# Patient Record
Sex: FEMALE | Race: WHITE | Employment: FULL TIME | URBAN - METROPOLITAN AREA
[De-identification: names, ages, dates, MRNs, and addresses within clinical notes are randomized per-mention and may not be internally consistent; named-entity substitution may affect disease eponyms.]

---

## 2017-01-19 ENCOUNTER — OFFICE VISIT (OUTPATIENT)
Dept: INTERNAL MEDICINE CLINIC | Age: 27
End: 2017-01-19

## 2017-01-19 VITALS
HEIGHT: 61 IN | OXYGEN SATURATION: 99 % | BODY MASS INDEX: 30.02 KG/M2 | HEART RATE: 80 BPM | WEIGHT: 159 LBS | TEMPERATURE: 98.4 F | RESPIRATION RATE: 16 BRPM | SYSTOLIC BLOOD PRESSURE: 122 MMHG | DIASTOLIC BLOOD PRESSURE: 80 MMHG

## 2017-01-19 DIAGNOSIS — G43.009 MIGRAINE WITHOUT AURA AND WITHOUT STATUS MIGRAINOSUS, NOT INTRACTABLE: ICD-10-CM

## 2017-01-19 DIAGNOSIS — E53.8 VITAMIN B12 DEFICIENCY: Primary | ICD-10-CM

## 2017-01-19 RX ORDER — DOCUSATE SODIUM 100 MG/1
100 CAPSULE, LIQUID FILLED ORAL 2 TIMES DAILY
COMMUNITY
End: 2017-06-23 | Stop reason: ALTCHOICE

## 2017-01-19 RX ORDER — CYANOCOBALAMIN 1000 UG/ML
1000 INJECTION, SOLUTION INTRAMUSCULAR; SUBCUTANEOUS ONCE
Qty: 1 ML | Refills: 0
Start: 2017-01-19 | End: 2017-01-19

## 2017-01-19 NOTE — MR AVS SNAPSHOT
Visit Information Date & Time Provider Department Dept. Phone Encounter #  
 1/19/2017  3:30 PM Vamsi Vang MD [facility] [address] [id number] Follow-up Instructions Return in about 3 weeks (around 2/9/2017) for B12 shot. Your Appointments 1/26/2017  3:20 PM  
Follow Up with Veronica Metz MD  
[facility] Appt Note: 2 mo migraine f/u. ..[place] [phone]  
  
   
 [address] [place] [id number] [address] Upcoming Health Maintenance Date Due DTaP/Tdap/Td series (1 - Tdap) 2/12/2009 PAP AKA CERVICAL CYTOLOGY 2/12/2009 INFLUENZA AGE 9 TO ADULT 8/1/2016 Allergies as of 1/19/2017  Review Complete On: 1/19/2017 By: Vamsi Vang MD  
  
 Severity Noted Reaction Type Reactions Sulfur  04/15/2016    Unknown (comments) Current Immunizations  Never Reviewed No immunizations on file. Not reviewed this visit You Were Diagnosed With   
  
 Codes Comments Vitamin B12 deficiency    -  Primary ICD-10-CM: E53.8 ICD-9-CM: 266.2 Migraine without aura and without status migrainosus, not intractable     ICD-10-CM: V98.126 ICD-9-CM: 346.10 Vitals BP Pulse Temp Resp Height(growth percentile) Weight(growth percentile) 122/80 (BP 1 Location: Left arm, BP Patient Position: Sitting) 80 98.4 °F (36.9 °C) (Oral) 16 5' 1\" (1.549 m) 159 lb (72.1 kg) SpO2 BMI OB Status Smoking Status 99% 30.04 kg/m2 Having regular periods Never Smoker Vitals History BMI and BSA Data Body Mass Index Body Surface Area 30.04 kg/m 2 1.76 m 2 Preferred Pharmacy Pharmacy Name Phone [facility] [address]  [facility] [phone] Your Updated Medication List  
  
   
 This list is accurate as of: 1/19/17  4:12 PM.  Always use your most recent med list.  
  
  
  
  
 COLACE 100 mg capsule Generic drug:  docusate sodium Take 100 mg by mouth two (2) times a day. cyanocobalamin 1,000 mcg/mL injection Commonly known as:  VITAMIN B-12  
1 mL by IntraMUSCular route once for 1 dose. * Diclofenac Potassium 50 mg Pwpk Commonly known as:  CAMBIA Take 1 Packet by mouth as needed. As directed * Diclofenac Potassium 50 mg Pwpk  
1 at HA onset and repeat in 2 hours x 1 prn MICROGESTIN FE 1.5/30 (28) 1.5 mg-30 mcg (21)/75 mg (7) Tab Generic drug:  norethindrone-ethinyl estradiol-iron  
  
 propranolol LA 80 mg SR capsule Commonly known as:  INDERAL LA Take 1 Cap by mouth daily. QNASL 80 mcg/actuation Hfaa Generic drug:  beclomethasone dipropionate ZYRTEC PO Take  by mouth. * Notice: This list has 2 medication(s) that are the same as other medications prescribed for you. Read the directions carefully, and ask your doctor or other care provider to review them with you. We Performed the Following THER/PROPH/DIAG INJECTION, SUBCUT/IM [id number] CPT(R)] VITAMIN B12 INJECTION [ \[facility] [place] [facility]\""] Follow-up Instructions Return in about 3 weeks (around 2/9/2017) for B12 shot. Introducing hospitals & HEALTH SERVICES! Dear Keegan Vargas: Thank you for requesting a NextGame account. Our records indicate that you already have an active NextGame account. You can access your account anytime at [email or web address] Did you know that you can access your hospital and ER discharge instructions at any time in NextGame? You can also review all of your test results from your hospital stay or ER visit. Additional Information If you have questions, please visit the Frequently Asked Questions section of the NextGame website at [email or web address] Remember, ZoomTilthart is NOT to be used for urgent needs. For medical emergencies, dial 911. Now available from your iPhone and Android! Please provide this summary of care documentation to your next provider. Your primary care clinician is listed as Rebecca Rogers. If you have any questions after today's visit, please call [phone].

## 2017-01-19 NOTE — PROGRESS NOTES
Liset Mensah is a 27 y.o. female who presents today for Vitamin B12 Deficiency  . She has a history of   Patient Active Problem List   Diagnosis Code    Nephrolithiasis N20.0    Allergic rhinitis J30.9    Family history of early CAD Z80.55    Migraine without aura and without status migrainosus, not intractable G43.009   . Today patient is here for B12 deficiency. she does not have other concerns. B12 deficiency: Tollerated two shots well. B12 coming up but still borderline low. Will give 3 more every 3 weeks and then repeat. No numbness or tingling. Migraines have been getting a bit worse. Topomax was helping but had stone. BB with minimal help. ROS  Review of Systems   Constitutional: Negative for chills, fever, malaise/fatigue and weight loss. HENT: Negative for congestion, ear discharge, ear pain, hearing loss, nosebleeds and tinnitus. Eyes: Negative for blurred vision, double vision, photophobia, pain and discharge. Respiratory: Negative for stridor. Cardiovascular: Negative for chest pain, palpitations, orthopnea and claudication. Gastrointestinal: Positive for constipation (Chronic) and nausea. Negative for abdominal pain, blood in stool, diarrhea, heartburn, melena and vomiting. Genitourinary: Negative for dysuria, frequency, hematuria and urgency. Musculoskeletal: Negative for back pain, joint pain, myalgias and neck pain. Neurological: Positive for headaches. Negative for dizziness, tingling, tremors, sensory change and speech change. Visit Vitals    /80 (BP 1 Location: Left arm, BP Patient Position: Sitting)    Pulse 80    Temp 98.4 °F (36.9 °C) (Oral)    Resp 16    Ht 5' 1\" (1.549 m)    Wt 159 lb (72.1 kg)    SpO2 99%    BMI 30.04 kg/m2       Physical Exam   Constitutional: She is oriented to person, place, and time and well-developed, well-nourished, and in no distress. No distress. HENT:   Head: Normocephalic and atraumatic. Mouth/Throat: Oropharynx is clear and moist.   Eyes: Conjunctivae are normal. Pupils are equal, round, and reactive to light. Neck: Normal range of motion. Neck supple. No thyromegaly present. Cardiovascular: Normal rate, regular rhythm and normal heart sounds. Exam reveals no gallop and no friction rub. No murmur heard. Pulmonary/Chest: Effort normal and breath sounds normal. No respiratory distress. She has no wheezes. She has no rales. She exhibits no tenderness. Abdominal: Soft. Bowel sounds are normal. She exhibits no distension. There is no tenderness. There is no rebound. Musculoskeletal: Normal range of motion. She exhibits no edema. Neurological: She is alert and oriented to person, place, and time. No cranial nerve deficit. Skin: Skin is warm and dry. She is not diaphoretic. No erythema. Psychiatric: Affect and judgment normal.       Current Outpatient Prescriptions   Medication Sig    docusate sodium (COLACE) 100 mg capsule Take 100 mg by mouth two (2) times a day.  cyanocobalamin (VITAMIN B-12) 1,000 mcg/mL injection 1 mL by IntraMUSCular route once for 1 dose.  propranolol LA (INDERAL LA) 80 mg SR capsule Take 1 Cap by mouth daily.  Diclofenac Potassium 50 mg pwpk 1 at HA onset and repeat in 2 hours x 1 prn    MICROGESTIN FE 1.5/30, 28, 1.5 mg-30 mcg (21)/75 mg (7) tab     QNASL 80 mcg/actuation HFAA     CETIRIZINE HCL (ZYRTEC PO) Take  by mouth.  Diclofenac Potassium (CAMBIA) 50 mg pwpk Take 1 Packet by mouth as needed. As directed     No current facility-administered medications for this visit.          Past Medical History   Diagnosis Date    Calculus of kidney     Headache       Past Surgical History   Procedure Laterality Date    Hx lithotripsy        Social History   Substance Use Topics    Smoking status: Never Smoker    Smokeless tobacco: Not on file    Alcohol use Yes      Family History   Problem Relation Age of Onset    Cancer Father      Prostate  Headache Father     Heart Disease Father     Diabetes Father         Allergies   Allergen Reactions    Sulfur Unknown (comments)        Assessment/Plan  Romeo Carson was seen today for vitamin b12 deficiency. Diagnoses and all orders for this visit:    Vitamin B12 deficiency - still borderline low. Repeat 3 more injections. Pt denies any numbness or tingling.  -     VITAMIN B12 INJECTION ()  -     THER/PROPH/DIAG INJ, SC/IM ([id number])  -     cyanocobalamin (VITAMIN B-12) 1,000 mcg/mL injection; 1 mL by IntraMUSCular route once for 1 dose. Migraine without aura and without status migrainosus, not intractable - seeing neurology next week. Good results with topomax, but s/e. BB not helping much. Follow-up Disposition:  Return in about 3 weeks (around 2/9/2017) for B12 shot.     Markus Anne MD  1/19/2017

## 2017-02-08 ENCOUNTER — OFFICE VISIT (OUTPATIENT)
Dept: NEUROLOGY | Age: 27
End: 2017-02-08

## 2017-02-08 VITALS
HEIGHT: 61 IN | SYSTOLIC BLOOD PRESSURE: 116 MMHG | DIASTOLIC BLOOD PRESSURE: 70 MMHG | RESPIRATION RATE: 18 BRPM | OXYGEN SATURATION: 98 % | HEART RATE: 80 BPM | WEIGHT: 158.6 LBS | BODY MASS INDEX: 29.94 KG/M2

## 2017-02-08 DIAGNOSIS — G43.919 INTRACTABLE MIGRAINE WITHOUT STATUS MIGRAINOSUS, UNSPECIFIED MIGRAINE TYPE: ICD-10-CM

## 2017-02-08 NOTE — PATIENT INSTRUCTIONS
[facility]   Statement to Patients  April 1, 2014      In an effort to ensure the large volume of patient prescription refills is processed in the most efficient and expeditious manner, we are asking our patients to assist us by calling your Pharmacy for all prescription refills, this will include also your  Mail Order Pharmacy. The pharmacy will contact our office electronically to continue the refill process. Please do not wait until the last minute to call your pharmacy. We need at least 48 hours (2days) to fill prescriptions. We also encourage you to call your pharmacy before going to  your prescription to make sure it is ready. With regard to controlled substance prescription refill requests (narcotic refills) that need to be picked up at our office, we ask your cooperation by providing us with at least 72 hours (3days) notice that you will need a refill. We will not refill narcotic prescription refill requests after 4:00pm on any weekday, Monday through Thursday, or after 2:00pm on Fridays, or on the weekends. We encourage everyone to explore another way of getting your prescription refill request processed using Qingguo, our patient web portal through our electronic medical record system. Qingguo is an efficient and effective way to communicate your medication request directly to the office and  downloadable as an nitza on your smart phone . Qingguo also features a review functionality that allows you to view your medication list as well as leave messages for your physician. Are you ready to get connected? If so please review the attatched instructions or speak to any of our staff to get you set up right away! Thank you so much for your cooperation. Should you have any questions please contact our Practice Administrator.     The Physicians and Staff,  [facility]              A Healthy Lifestyle: Care Instructions  Your Care Instructions  A healthy lifestyle can help you feel good, stay at a healthy weight, and have plenty of energy for both work and play. A healthy lifestyle is something you can share with your whole family. A healthy lifestyle also can lower your risk for serious health problems, such as high blood pressure, heart disease, and diabetes. You can follow a few steps listed below to improve your health and the health of your family. Follow-up care is a key part of your treatment and safety. Be sure to make and go to all appointments, and call your doctor if you are having problems. Its also a good idea to know your test results and keep a list of the medicines you take. How can you care for yourself at home? · Do not eat too much sugar, fat, or fast foods. You can still have dessert and treats now and then. The goal is moderation. · Start small to improve your eating habits. Pay attention to portion sizes, drink less juice and soda pop, and eat more fruits and vegetables. ¨ Eat a healthy amount of food. A 3-ounce serving of meat, for example, is about the size of a deck of cards. Fill the rest of your plate with vegetables and whole grains. ¨ Limit the amount of soda and sports drinks you have every day. Drink more water when you are thirsty. ¨ Eat at least 5 servings of fruits and vegetables every day. It may seem like a lot, but it is not hard to reach this goal. A serving or helping is 1 piece of fruit, 1 cup of vegetables, or 2 cups of leafy, raw vegetables. Have an apple or some carrot sticks as an afternoon snack instead of a candy bar. Try to have fruits and/or vegetables at every meal.  · Make exercise part of your daily routine. You may want to start with simple activities, such as walking, bicycling, or slow swimming. Try to be active 30 to 60 minutes every day. You do not need to do all 30 to 60 minutes all at once. For example, you can exercise 3 times a day for 10 or 20 minutes.  Moderate exercise is safe for most people, but it is always a good idea to talk to your doctor before starting an exercise program.  · Keep moving. [place] the lawn, work in the garden, or [place]. Take the stairs instead of the elevator at work. · If you smoke, quit. People who smoke have an increased risk for heart attack, stroke, cancer, and other lung illnesses. Quitting is hard, but there are ways to boost your chance of quitting tobacco for good. ¨ Use nicotine gum, patches, or lozenges. ¨ Ask your doctor about stop-smoking programs and medicines. ¨ Keep trying. In addition to reducing your risk of diseases in the future, you will notice some benefits soon after you stop using tobacco. If you have shortness of breath or asthma symptoms, they will likely get better within a few weeks after you quit. · Limit how much alcohol you drink. Moderate amounts of alcohol (up to 2 drinks a day for men, 1 drink a day for women) are okay. But drinking too much can lead to liver problems, high blood pressure, and other health problems. Family health  If you have a family, there are many things you can do together to improve your health. · Eat meals together as a family as often as possible. · Eat healthy foods. This includes fruits, vegetables, lean meats and dairy, and whole grains. · Include your family in your fitness plan. Most people think of activities such as jogging or tennis as the way to fitness, but there are many ways you and your family can be more active. Anything that makes you breathe hard and gets your heart pumping is exercise. Here are some tips:  ¨ Walk to do errands or to take your child to school or the bus. ¨ Go for a family bike ride after dinner instead of watching TV. Where can you learn more? Go to http://lalit-rose.info/. Enter Z097 in the search box to learn more about \"A Healthy Lifestyle: Care Instructions. \"  Current as of: July 26, 2016  Content Version: 11.1  © 7460-5115 [employer]. Care instructions adapted under license by [employer] (which disclaims liability or warranty for this information). If you have questions about a medical condition or this instruction, always ask your healthcare professional. [address] any warranty or liability for your use of this information.

## 2017-02-08 NOTE — MR AVS SNAPSHOT
Visit Information Date & Time Provider Department Dept. Phone Encounter #  
 2/8/2017  3:30 PM Quiana Johnson NP [facility] [phone] [id number] Follow-up Instructions Return after botox. Your Appointments 2/9/2017  3:30 PM  
ROUTINE CARE with Simon Diaz MD  
[facility]) Appt Note: b12 injection [address] [place] [address]  
[phone]  
  
   
 [address] [place] [address] Upcoming Health Maintenance Date Due DTaP/Tdap/Td series (1 - Tdap) 2/12/2009 PAP AKA CERVICAL CYTOLOGY 2/12/2009 INFLUENZA AGE 9 TO ADULT 8/1/2016 Allergies as of 2/8/2017  Review Complete On: 2/8/2017 By: Hannah Hylton LPN Severity Noted Reaction Type Reactions Sulfur  04/15/2016    Unknown (comments) Current Immunizations  Never Reviewed No immunizations on file. Not reviewed this visit You Were Diagnosed With   
  
 Codes Comments Intractable migraine without status migrainosus, unspecified migraine type     ICD-10-CM: G43.919 ICD-9-CM: 346.91 Vitals BP Pulse Resp Height(growth percentile) Weight(growth percentile) SpO2  
 116/70 80 18 5' 1\" (1.549 m) 158 lb 9.6 oz (71.9 kg) 98% BMI OB Status Smoking Status 29.97 kg/m2 Having regular periods Never Smoker Vitals History BMI and BSA Data Body Mass Index Body Surface Area  
 29.97 kg/m 2 1.76 m 2 Preferred Pharmacy Pharmacy Name Phone [facility]ORE 1 [address] AT 2 [facility] [phone] Your Updated Medication List  
  
   
This list is accurate as of: 2/8/17  3:54 PM.  Always use your most recent med list.  
  
  
  
  
 COLACE 100 mg capsule Generic drug:  docusate sodium Take 100 mg by mouth two (2) times a day. * Diclofenac Potassium 50 mg Pwpk Commonly known as:  CAMBIA Take 1 Packet by mouth as needed. As directed * Diclofenac Potassium 50 mg Pwpk  
1 at HA onset and repeat in 2 hours x 1 prn MICROGESTIN FE 1.5/30 (28) 1.5 mg-30 mcg (21)/75 mg (7) Tab Generic drug:  norethindrone-ethinyl estradiol-iron  
  
 propranolol LA 80 mg SR capsule Commonly known as:  INDERAL LA Take 1 Cap by mouth daily. QNASL 80 mcg/actuation Hfaa Generic drug:  beclomethasone dipropionate ZYRTEC PO Take  by mouth. * Notice: This list has 2 medication(s) that are the same as other medications prescribed for you. Read the directions carefully, and ask your doctor or other care provider to review them with you. Prescriptions Sent to Pharmacy Refills Diclofenac Potassium 50 mg pwpk 5 Si at HA onset and repeat in 2 hours x 1 prn Class: Normal  
 Pharmacy: [facility] [address] [place] [address] [address] AT [facility] of [address] ([facility] Ph #: [phone] We Performed the Following REFERRAL TO NEUROLOGY [[id number] Custom] Comments:  
 Please evaluate patient for botox Follow-up Instructions Return after botox. Referral Information Referral ID Referred By Referred To  
  
 [id number] Daniel Baker MD   
   [place] [address] [address] Phone: [phone] Fax: [phone] Visits Status Start Date End Date 1 New Request 17 If your referral has a status of pending review or denied, additional information will be sent to support the outcome of this decision. Patient Instructions PRESCRIPTION REFILL POLICY [facility] Statement to Patients 2014 In an effort to ensure the large volume of patient prescription refills is processed in the most efficient and expeditious manner, we are asking our patients to assist us by calling your Pharmacy for all prescription refills, this will include also your  Mail Order Pharmacy. The pharmacy will contact our office electronically to continue the refill process. Please do not wait until the last minute to call your pharmacy. We need at least 48 hours (2days) to fill prescriptions. We also encourage you to call your pharmacy before going to  your prescription to make sure it is ready. With regard to controlled substance prescription refill requests (narcotic refills) that need to be picked up at our office, we ask your cooperation by providing us with at least 72 hours (3days) notice that you will need a refill. We will not refill narcotic prescription refill requests after 4:00pm on any weekday, Monday through Thursday, or after 2:00pm on Fridays, or on the weekends. We encourage everyone to explore another way of getting your prescription refill request processed using [employer], our patient web portal through our electronic medical record system. [employer] is an efficient and effective way to communicate your medication request directly to the office and  downloadable as an nitza on your smart phone . [employer] also features a review functionality that allows you to view your medication list as well as leave messages for your physician. Are you ready to get connected? If so please review the attatched instructions or speak to any of our staff to get you set up right away! Thank you so much for your cooperation. Should you have any questions please contact our Practice Administrator. The Physicians and Staff,  [facility] A Healthy Lifestyle: Care Instructions Your Care Instructions A healthy lifestyle can help you feel good, stay at a healthy weight, and have plenty of energy for both work and play.  A healthy lifestyle is something you can share with your whole family. A healthy lifestyle also can lower your risk for serious health problems, such as high blood pressure, heart disease, and diabetes. You can follow a few steps listed below to improve your health and the health of your family. Follow-up care is a key part of your treatment and safety. Be sure to make and go to all appointments, and call your doctor if you are having problems. Its also a good idea to know your test results and keep a list of the medicines you take. How can you care for yourself at home? · Do not eat too much sugar, fat, or fast foods. You can still have dessert and treats now and then. The goal is moderation. · Start small to improve your eating habits. Pay attention to portion sizes, drink less juice and soda pop, and eat more fruits and vegetables. ¨ Eat a healthy amount of food. A 3-ounce serving of meat, for example, is about the size of a deck of cards. Fill the rest of your plate with vegetables and whole grains. ¨ Limit the amount of soda and sports drinks you have every day. Drink more water when you are thirsty. ¨ Eat at least 5 servings of fruits and vegetables every day. It may seem like a lot, but it is not hard to reach this goal. A serving or helping is 1 piece of fruit, 1 cup of vegetables, or 2 cups of leafy, raw vegetables. Have an apple or some carrot sticks as an afternoon snack instead of a candy bar. Try to have fruits and/or vegetables at every meal. 
· Make exercise part of your daily routine. You may want to start with simple activities, such as walking, bicycling, or slow swimming. Try to be active 30 to 60 minutes every day. You do not need to do all 30 to 60 minutes all at once. For example, you can exercise 3 times a day for 10 or 20 minutes.  Moderate exercise is safe for most people, but it is always a good idea to talk to your doctor before starting an exercise program. 
 · Keep moving. Jacqueline Folk the lawn, work in the garden, or [employer]. Take the stairs instead of the elevator at work. · If you smoke, quit. People who smoke have an increased risk for heart attack, stroke, cancer, and other lung illnesses. Quitting is hard, but there are ways to boost your chance of quitting tobacco for good. ¨ Use nicotine gum, patches, or lozenges. ¨ Ask your doctor about stop-smoking programs and medicines. ¨ Keep trying. In addition to reducing your risk of diseases in the future, you will notice some benefits soon after you stop using tobacco. If you have shortness of breath or asthma symptoms, they will likely get better within a few weeks after you quit. · Limit how much alcohol you drink. Moderate amounts of alcohol (up to 2 drinks a day for men, 1 drink a day for women) are okay. But drinking too much can lead to liver problems, high blood pressure, and other health problems. Family health If you have a family, there are many things you can do together to improve your health. · Eat meals together as a family as often as possible. · Eat healthy foods. This includes fruits, vegetables, lean meats and dairy, and whole grains. · Include your family in your fitness plan. Most people think of activities such as jogging or tennis as the way to fitness, but there are many ways you and your family can be more active. Anything that makes you breathe hard and gets your heart pumping is exercise. Here are some tips: 
¨ Walk to do errands or to take your child to school or the bus. ¨ Go for a family bike ride after dinner instead of watching TV. Where can you learn more? Go to http://lalit-rose.info/. Enter G997 in the search box to learn more about \"A Healthy Lifestyle: Care Instructions. \" Current as of: July 26, 2016 Content Version: 11.1 © 9829-8893 [employer].  Care instructions adapted under license by [address] (which disclaims liability or warranty for this information). If you have questions about a medical condition or this instruction, always ask your healthcare professional. [address] any warranty or liability for your use of this information. Introducing [facility] & HEALTH SERVICES! Dear Becky [address]: Thank you for requesting a [employer] account. Our records indicate that you already have an active [employer] account. You can access your account anytime at [email or web address] Did you know that you can access your hospital and ER discharge instructions at any time in [employer]? You can also review all of your test results from your hospital stay or ER visit. Additional Information If you have questions, please visit the Frequently Asked Questions section of the [employer] website at [email or web address] Remember, [employer] is NOT to be used for urgent needs. For medical emergencies, dial 911. Now available from your iPhone and Android! Please provide this summary of care documentation to your next provider. Your primary care clinician is listed as Max Romero. If you have any questions after today's visit, please call [phone].

## 2017-02-08 NOTE — PROGRESS NOTES
Patient present for a follow up. Reports had 1 migraine since the last visit. Has experienced more headaches since starting the medication. Gets about 2 to 3 headaches a week and occur whenever she's stressed.

## 2017-02-09 ENCOUNTER — OFFICE VISIT (OUTPATIENT)
Dept: INTERNAL MEDICINE CLINIC | Age: 27
End: 2017-02-09

## 2017-02-09 VITALS
WEIGHT: 160 LBS | TEMPERATURE: 98.4 F | HEIGHT: 61 IN | RESPIRATION RATE: 16 BRPM | OXYGEN SATURATION: 98 % | BODY MASS INDEX: 30.21 KG/M2 | SYSTOLIC BLOOD PRESSURE: 120 MMHG | HEART RATE: 90 BPM | DIASTOLIC BLOOD PRESSURE: 66 MMHG

## 2017-02-09 DIAGNOSIS — Z20.828 EXPOSURE TO THE FLU: ICD-10-CM

## 2017-02-09 DIAGNOSIS — R09.81 NASAL CONGESTION: ICD-10-CM

## 2017-02-09 DIAGNOSIS — G43.009 MIGRAINE WITHOUT AURA AND WITHOUT STATUS MIGRAINOSUS, NOT INTRACTABLE: ICD-10-CM

## 2017-02-09 DIAGNOSIS — E53.8 B12 DEFICIENCY: Primary | ICD-10-CM

## 2017-02-09 LAB
QUICKVUE INFLUENZA TEST: NEGATIVE
VALID INTERNAL CONTROL?: YES

## 2017-02-09 RX ORDER — CYANOCOBALAMIN 1000 UG/ML
1000 INJECTION, SOLUTION INTRAMUSCULAR; SUBCUTANEOUS ONCE
Qty: 1 ML | Refills: 0
Start: 2017-02-09 | End: 2017-02-09

## 2017-02-09 RX ORDER — OSELTAMIVIR PHOSPHATE 75 MG/1
75 CAPSULE ORAL DAILY
Qty: 10 CAP | Refills: 0 | Status: SHIPPED | OUTPATIENT
Start: 2017-02-09 | End: 2017-02-19

## 2017-02-09 NOTE — MR AVS SNAPSHOT
Visit Information Date & Time Provider Department Dept. Phone Encounter #  
 2/9/2017  3:30 PM Kimmy Aceves MD [facility] [address]  [id number] Follow-up Instructions Return in about 3 weeks (around 3/2/2017). Upcoming Health Maintenance Date Due DTaP/Tdap/Td series (1 - Tdap) 2/12/2009 PAP AKA CERVICAL CYTOLOGY 2/12/2009 INFLUENZA AGE 9 TO ADULT 8/1/2016 Allergies as of 2/9/2017  Review Complete On: 2/9/2017 By: Michael Jackson LPN Severity Noted Reaction Type Reactions Sulfur  04/15/2016    Unknown (comments) Current Immunizations  Never Reviewed No immunizations on file. Not reviewed this visit You Were Diagnosed With   
  
 Codes Comments B12 deficiency    -  Primary ICD-10-CM: E53.8 ICD-9-CM: 266.2 Migraine without aura and without status migrainosus, not intractable     ICD-10-CM: D77.966 ICD-9-CM: 346.10 Nasal congestion     ICD-10-CM: R09.81 ICD-9-CM: 478.19 Exposure to the flu     ICD-10-CM: Z20.828 ICD-9-CM: V01.79 Vitals BP Pulse Temp Resp Height(growth percentile) Weight(growth percentile) 120/66 (BP 1 Location: Left arm, BP Patient Position: Sitting) 90 98.4 °F (36.9 °C) (Oral) 16 5' 1\" (1.549 m) 160 lb (72.6 kg) SpO2 BMI OB Status Smoking Status 98% 30.23 kg/m2 Having regular periods Never Smoker Vitals History BMI and BSA Data Body Mass Index Body Surface Area  
 30.23 kg/m 2 1.77 m 2 Preferred Pharmacy Pharmacy Name Phone [facility] DR[facility]ORE 1 [address]  [facility] [phone] Your Updated Medication List  
  
   
This list is accurate as of: 2/9/17  3:49 PM.  Always use your most recent med list.  
  
  
  
  
 COLACE 100 mg capsule Generic drug:  docusate sodium Take 100 mg by mouth two (2) times a day. cyanocobalamin 1,000 mcg/mL injection Commonly known as:  VITAMIN B-12  
1 mL by IntraMUSCular route once for 1 dose. * Diclofenac Potassium 50 mg Pwpk Commonly known as:  CAMBIA Take 1 Packet by mouth as needed. As directed * Diclofenac Potassium 50 mg Pwpk  
1 at HA onset and repeat in 2 hours x 1 prn MICROGESTIN FE 1.5/30 (28) 1.5 mg-30 mcg (21)/75 mg (7) Tab Generic drug:  norethindrone-ethinyl estradiol-iron  
  
 oseltamivir 75 mg capsule Commonly known as:  TAMIFLU Take 1 Cap by mouth daily for 10 days. propranolol LA 80 mg SR capsule Commonly known as:  INDERAL LA Take 1 Cap by mouth daily. QNASL 80 mcg/actuation Hfaa Generic drug:  beclomethasone dipropionate ZYRTEC PO Take  by mouth. * Notice: This list has 2 medication(s) that are the same as other medications prescribed for you. Read the directions carefully, and ask your doctor or other care provider to review them with you. Prescriptions Sent to Pharmacy Refills  
 oseltamivir (TAMIFLU) 75 mg capsule 0 Sig: Take 1 Cap by mouth daily for 10 days. Class: Normal  
 Pharmacy: [facility] [address] [place] [address] [address] AT [facility] of [address] (hospitals Ph #: [phone] Route: Oral  
  
We Performed the Following AMB POC RAPID INFLUENZA TEST [43180 CPT(R)] THER/PROPH/DIAG INJECTION, SUBCUT/IM [id number] CPT(R)] VITAMIN B12 INJECTION [ [facility]] Follow-up Instructions Return in about 3 weeks (around 3/2/2017). Introducing [facility] & HEALTH SERVICES! Dear Wyatt Gutierrez: Thank you for requesting a [employer] account. Our records indicate that you already have an active [employer] account. You can access your account anytime at [email or web address] Did you know that you can access your hospital and ER discharge instructions at any time in [employer]? You can also review all of your test results from your hospital stay or ER visit. Additional Information If you have questions, please visit the Frequently Asked Questions section of the [employer] website at [email or web address] Remember, [employer] is NOT to be used for urgent needs. For medical emergencies, dial 911. Now available from your iPhone and Android! Please provide this summary of care documentation to your next provider. Your primary care clinician is listed as [facility]. If you have any questions after today's visit, please call [phone].

## 2017-02-09 NOTE — PROGRESS NOTES
Isaac Giron is a 27 y.o. female who presents today for Injection B12 and Nasal Congestion  . She has a history of   Patient Active Problem List   Diagnosis Code    Nephrolithiasis N20.0    Allergic rhinitis J30.9    Family history of early CAD Z80.55    Migraine without aura and without status migrainosus, not intractable G43.009    B12 deficiency E53.8   . Today patient is here for B12 injection. Migraines continuing to be     Tolerated other injections, but seems to have not all been administered into the arm as it has dripped down. Upper respiratory illness:  Isaac Giron presents with complaints of congestion, swollen glands, night sweats and hoarseness for 2 days. no nausea and no vomiting . she has not had  productive cough and chills. Symptoms are moderate. Over-the-counter remedies including None   Drinking plenty of fluids: yes  Asthma?:  no  non-smoker  Contacts with similar infections: yes, children at school. ROS  Review of Systems   Constitutional: Positive for malaise/fatigue. Negative for chills, diaphoresis, fever and weight loss. HENT: Positive for congestion. Negative for ear discharge, ear pain, hearing loss, nosebleeds, sore throat and tinnitus. Eyes: Negative for blurred vision, double vision and photophobia. Respiratory: Negative for cough, hemoptysis, sputum production, shortness of breath, wheezing and stridor. Cardiovascular: Negative for chest pain, palpitations, orthopnea, claudication and leg swelling. Gastrointestinal: Negative for abdominal pain, blood in stool, constipation, diarrhea, heartburn, nausea and vomiting. Genitourinary: Negative for dysuria, frequency, hematuria and urgency. Musculoskeletal: Negative for back pain, joint pain, myalgias and neck pain. Skin: Negative for rash. Neurological: Positive for headaches. Endo/Heme/Allergies: Does not bruise/bleed easily.    Psychiatric/Behavioral: Negative for depression, hallucinations, memory loss, substance abuse and suicidal ideas. Visit Vitals    /66 (BP 1 Location: Left arm, BP Patient Position: Sitting)    Pulse 90    Temp 98.4 °F (36.9 °C) (Oral)    Resp 16    Ht 5' 1\" (1.549 m)    Wt 160 lb (72.6 kg)    SpO2 98%    BMI 30.23 kg/m2       Physical Exam   Constitutional: She is oriented to person, place, and time. She appears well-developed and well-nourished. No distress. HENT:   Head: Normocephalic and atraumatic. Right Ear: External ear normal.   Left Ear: External ear normal.   Eyes: EOM are normal. Pupils are equal, round, and reactive to light. Neck: Normal range of motion. Neck supple. No thyromegaly present. Cardiovascular: Normal rate and regular rhythm. No murmur heard. Pulmonary/Chest: Effort normal and breath sounds normal. She has no wheezes. Abdominal: Soft. Bowel sounds are normal. She exhibits no distension. Musculoskeletal: Normal range of motion. She exhibits no edema. Neurological: She is alert and oriented to person, place, and time. No cranial nerve deficit. Skin: Skin is warm and dry. Psychiatric: She has a normal mood and affect. Her behavior is normal.       Current Outpatient Prescriptions   Medication Sig    cyanocobalamin (VITAMIN B-12) 1,000 mcg/mL injection 1 mL by IntraMUSCular route once for 1 dose.  oseltamivir (TAMIFLU) 75 mg capsule Take 1 Cap by mouth daily for 10 days.  Diclofenac Potassium 50 mg pwpk 1 at HA onset and repeat in 2 hours x 1 prn    docusate sodium (COLACE) 100 mg capsule Take 100 mg by mouth two (2) times a day.  propranolol LA (INDERAL LA) 80 mg SR capsule Take 1 Cap by mouth daily.  Diclofenac Potassium (CAMBIA) 50 mg pwpk Take 1 Packet by mouth as needed. As directed    MICROGESTIN FE 1.5/30, 28, 1.5 mg-30 mcg (21)/75 mg (7) tab     QNASL 80 mcg/actuation HFAA     CETIRIZINE HCL (ZYRTEC PO) Take  by mouth.      No current facility-administered medications for this visit. Past Medical History   Diagnosis Date    Calculus of kidney     Headache       Past Surgical History   Procedure Laterality Date    Hx lithotripsy        Social History   Substance Use Topics    Smoking status: Never Smoker    Smokeless tobacco: Not on file    Alcohol use Yes      Family History   Problem Relation Age of Onset    Cancer Father      Prostate    Headache Father     Heart Disease Father     Diabetes Father         Allergies   Allergen Reactions    Sulfur Unknown (comments)        Assessment/Plan  Gomez Donald was seen today for injection b12 and nasal congestion. Diagnoses and all orders for this visit:    B12 deficiency - Injection today. Repeat in 3 weeks. Then evaluate B12 levels and consider intranasal.   -     VITAMIN B12 INJECTION ()  -     THER/PROPH/DIAG INJ, SC/IM ([id number])    Migraine without aura and without status migrainosus, not intractable    Nasal congestion  -     AMB POC RAPID INFLUENZA TEST    Exposure to the flu - given confirmed case of student at school and symptoms, will give prophylaxis. -     oseltamivir (TAMIFLU) 75 mg capsule; Take 1 Cap by mouth daily for 10 days. Other orders  -     cyanocobalamin (VITAMIN B-12) 1,000 mcg/mL injection; 1 mL by IntraMUSCular route once for 1 dose. Follow-up Disposition:  Return in about 3 weeks (around 3/2/2017).     Pura Gates MD  2/9/2017

## 2017-02-13 NOTE — PROGRESS NOTES
Dean Ramirez is a 32 y.o. female who presents with the following  Chief Complaint   Patient presents with    Migraine     follow up       HPI Patient comes in for a follow up for migraines vs. Headaches. She has about 2-3 migraines a week lasting anywhere from 4 hours to longer. She has day to day normal headaches also. She states her headaches are normally daily and last about 5-6 hours with medications longer without. She is currently on Inderal LA 80 mg and has tried TOpamax without relief. Also tried Effexor and this did not work and she had side effects. She states she estimates about 15 headache days a month lasting about 4 hours or more. She has been suffering with these headaches for longer then a year and she feels like overall she has not seen any improvement. She has tried a few abortive therapies but Blaze Avelar has worked the best for her and usually helps within a few hours. Headaches are the same. Have not changed. Allergies   Allergen Reactions    Sulfur Unknown (comments)       Current Outpatient Prescriptions   Medication Sig    Diclofenac Potassium 50 mg pwpk 1 at HA onset and repeat in 2 hours x 1 prn    propranolol LA (INDERAL LA) 80 mg SR capsule Take 1 Cap by mouth daily.  Diclofenac Potassium (CAMBIA) 50 mg pwpk Take 1 Packet by mouth as needed. As directed    MICROGESTIN FE 1.5/30, 28, 1.5 mg-30 mcg (21)/75 mg (7) tab     QNASL 80 mcg/actuation HFAA     CETIRIZINE HCL (ZYRTEC PO) Take  by mouth.  oseltamivir (TAMIFLU) 75 mg capsule Take 1 Cap by mouth daily for 10 days.  docusate sodium (COLACE) 100 mg capsule Take 100 mg by mouth two (2) times a day. No current facility-administered medications for this visit.         History   Smoking Status    Never Smoker   Smokeless Tobacco    Not on file       Past Medical History   Diagnosis Date    Calculus of kidney     Headache        Past Surgical History   Procedure Laterality Date    Hx lithotripsy         Family History Problem Relation Age of Onset    Cancer Father      Prostate    Headache Father     Heart Disease Father     Diabetes Father        Social History     Social History    Marital status: SINGLE     Spouse name: N/A    Number of children: N/A    Years of education: N/A     Social History Main Topics    Smoking status: Never Smoker    Smokeless tobacco: None    Alcohol use Yes    Drug use: No    Sexual activity: Not Currently     Other Topics Concern    None     Social History Narrative       Review of Systems   HENT: Negative for ear pain, hearing loss and tinnitus. Eyes: Positive for blurred vision and photophobia. Negative for double vision. Respiratory: Negative for shortness of breath and wheezing. Cardiovascular: Negative for chest pain and palpitations. Gastrointestinal: Negative for nausea and vomiting. Neurological: Positive for headaches. Negative for dizziness, tingling, tremors and weakness. Remainder of comprehensive review is negative. Physical Exam :    Visit Vitals    /70    Pulse 80    Resp 18    Ht 5' 1\" (1.549 m)    Wt 71.9 kg (158 lb 9.6 oz)    SpO2 98%    BMI 29.97 kg/m2       General: Well defined, nourished, and groomed individual in no acute distress.    Neck: Supple, nontender, no bruits, no pain with resistance to active range of motion.    Heart: Regular rate and rhythm, no murmurs, rub, or gallop. Normal S1S2. Lungs: Clear to auscultation bilaterally with equal chest expansion, no cough, no wheeze  Musculoskeletal: Extremities revealed no edema and had full range of motion of joints.    Psych: Good mood and bright affect    NEUROLOGICAL EXAMINATION:    Mental Status: Alert and oriented to person, place, and time    Cranial Nerves:    II, III, IV, VI: Visual acuity grossly intact. Visual fields are normal.    Pupils are equal, round, and reactive to light and accommodation.    Extra-ocular movements are full and fluid.  Fundoscopic exam was benign, no ptosis or nystagmus.    V-XII: Hearing is grossly intact. Facial features are symmetric, with normal sensation and strength. The palate rises symmetrically and the tongue protrudes midline. Sternocleidomastoids 5/5. Motor Examination: Normal tone, bulk, and strength, 5/5 muscle strength throughout. Coordination: Finger to nose was normal. No resting or intention tremor    Gait and Station: Steady while walking. Normal arm swing. No pronator drift. No muscle wasting or fasiculations noted. Reflexes: DTRs 2+ throughout. Results for orders placed or performed in visit on 17   VITAMIN B12 & FOLATE   Result Value Ref Range    Vitamin B12 245 211 - 946 pg/mL    Folate 13.8 >3.0 ng/mL       Orders Placed This Encounter    REFERRAL TO NEUROLOGY     Referral Priority:   Routine     Referral Type:   Consultation     Referral Reason:   Specialty Services Required     Referred to Provider:   Chen Jackson MD    Diclofenac Potassium 50 mg pwpk     Si at HA onset and repeat in 2 hours x 1 prn     Dispense:  9 Packet     Refill:  5       1. Intractable migraine without status migrainosus, unspecified migraine type        Follow-up Disposition:  Return after botox. Worsening migraines and headaches. She is having 15 headache days a month all lasting longer then 4 hours. She is currently on Inderal LA and has tried Topamax and Effexor XR in the past without any relief or difference. We will refer for Botox as with her current frequency and duration she will benefit from this treatment and has failed multiple others. We discussed this treatment and she has no questions. Call if things get worse.          Yoseph Dose, NP        This note will not be viewable in [address]

## 2017-02-20 ENCOUNTER — TELEPHONE (OUTPATIENT)
Dept: NEUROLOGY | Age: 27
End: 2017-02-20

## 2017-02-20 NOTE — TELEPHONE ENCOUNTER
Per hello: \" please call asap before procedure is done. Ask to speak to nurse in OP review about procedure for this pt. \"

## 2017-02-20 NOTE — TELEPHONE ENCOUNTER
Called insurance co   botox denied due to documentation  Of if headaches have been for 6 months and if having them 15 days or more  Can set up for P2P by calling   [phone]  Please advise

## 2017-02-28 ENCOUNTER — OFFICE VISIT (OUTPATIENT)
Dept: INTERNAL MEDICINE CLINIC | Age: 27
End: 2017-02-28

## 2017-02-28 VITALS
TEMPERATURE: 98.3 F | BODY MASS INDEX: 30.02 KG/M2 | WEIGHT: 159 LBS | SYSTOLIC BLOOD PRESSURE: 118 MMHG | OXYGEN SATURATION: 98 % | HEART RATE: 93 BPM | HEIGHT: 61 IN | RESPIRATION RATE: 16 BRPM | DIASTOLIC BLOOD PRESSURE: 68 MMHG

## 2017-02-28 DIAGNOSIS — E53.8 B12 DEFICIENCY: Primary | ICD-10-CM

## 2017-02-28 DIAGNOSIS — K59.00 CONSTIPATION, UNSPECIFIED CONSTIPATION TYPE: ICD-10-CM

## 2017-02-28 RX ORDER — OSELTAMIVIR PHOSPHATE 75 MG/1
CAPSULE ORAL
Refills: 0 | COMMUNITY
Start: 2017-02-09 | End: 2017-02-28 | Stop reason: ALTCHOICE

## 2017-02-28 NOTE — PROGRESS NOTES
Isaac Giron is a 32 y.o. female who presents today for Injection B12 and Mass (on left arm)  . She has a history of   Patient Active Problem List   Diagnosis Code    Nephrolithiasis N20.0    Allergic rhinitis J30.9    Family history of early CAD Z80.55    Migraine without aura and without status migrainosus, not intractable G43.009    B12 deficiency E53.8   . Today patient is here for B12 injection. she does have other concerns. B12 deficiency: this will be the 4th injection of B12. Lipoma on L arm. Has been stable. No changes in size. No pain or swelling. ROS  Review of Systems   Constitutional: Negative for chills, fever and weight loss. HENT: Negative for congestion and sore throat. Eyes: Negative for blurred vision, double vision, photophobia and redness. Respiratory: Negative for cough and shortness of breath. Cardiovascular: Negative for chest pain, palpitations and leg swelling. Gastrointestinal: Negative for abdominal pain, constipation, diarrhea, heartburn, nausea and vomiting. Genitourinary: Negative for dysuria, frequency and urgency. Musculoskeletal: Negative for joint pain and myalgias. Skin: Negative for rash. Neurological: Negative. Negative for headaches. Endo/Heme/Allergies: Does not bruise/bleed easily. Psychiatric/Behavioral: Negative for memory loss and suicidal ideas. Visit Vitals    /68 (BP 1 Location: Left arm, BP Patient Position: Sitting)    Pulse 93    Temp 98.3 °F (36.8 °C) (Oral)    Resp 16    Ht 5' 1\" (1.549 m)    Wt 159 lb (72.1 kg)    SpO2 98%    BMI 30.04 kg/m2       Physical Exam   Constitutional: She is oriented to person, place, and time. She appears well-developed and well-nourished. No distress. HENT:   Head: Normocephalic and atraumatic. Right Ear: External ear normal.   Left Ear: External ear normal.   Eyes: EOM are normal.   Neck: Normal range of motion.    Cardiovascular: Normal rate and regular rhythm. Pulmonary/Chest: Effort normal and breath sounds normal.   Musculoskeletal: Normal range of motion. Small Lipoma to L forearm   Neurological: She is alert and oriented to person, place, and time. Skin: Skin is warm and dry. Current Outpatient Prescriptions   Medication Sig    propranolol LA (INDERAL LA) 80 mg SR capsule Take 1 Cap by mouth daily.  Diclofenac Potassium (CAMBIA) 50 mg pwpk Take 1 Packet by mouth as needed. As directed    MICROGESTIN FE 1.5/30, 28, 1.5 mg-30 mcg (21)/75 mg (7) tab     QNASL 80 mcg/actuation HFAA     CETIRIZINE HCL (ZYRTEC PO) Take  by mouth.  Diclofenac Potassium 50 mg pwpk 1 at HA onset and repeat in 2 hours x 1 prn    docusate sodium (COLACE) 100 mg capsule Take 100 mg by mouth two (2) times a day. No current facility-administered medications for this visit. Past Medical History:   Diagnosis Date    Calculus of kidney     Headache       Past Surgical History:   Procedure Laterality Date    HX LITHOTRIPSY        Social History   Substance Use Topics    Smoking status: Never Smoker    Smokeless tobacco: Not on file    Alcohol use Yes      Family History   Problem Relation Age of Onset    Cancer Father      Prostate    Headache Father     Heart Disease Father     Diabetes Father         Allergies   Allergen Reactions    Sulfur Unknown (comments)        Assessment/Plan  Britany was seen today for injection b12 and mass. Diagnoses and all orders for this visit:    B12 deficiency - Unclear etiology. Pt has now received multiple shots. Will check level in 2 weeks or so. If at acceptable levels, will try sublingual vs. Nasal B12 and monitor. Celiac screen by EGD negative in the past.   -     VITAMIN B12 & FOLATE    Constipation: will be starting colace. Discussed less likely to cause diarrhea compared to johnny lax. Follow-up Disposition: Not on File  Will discuss based on blood work.    Nelida Cintron MD  2/28/2017

## 2017-02-28 NOTE — MR AVS SNAPSHOT
Visit Information Date & Time Provider Department Dept. Phone Encounter #  
 2/28/2017  3:30 PM Luciano Franklin MD [facility] [address] [place] [id number] Upcoming Health Maintenance Date Due DTaP/Tdap/Td series (1 - Tdap) 2/12/2009 PAP AKA CERVICAL CYTOLOGY 2/12/2009 INFLUENZA AGE 9 TO ADULT 8/1/2016 Allergies as of 2/28/2017  Review Complete On: 2/28/2017 By: Erica Dakins, LPN Severity Noted Reaction Type Reactions Sulfur  04/15/2016    Unknown (comments) Current Immunizations  Never Reviewed No immunizations on file. Not reviewed this visit You Were Diagnosed With   
  
 Codes Comments B12 deficiency    -  Primary ICD-10-CM: E53.8 ICD-9-CM: 266.2 Vitals BP  
  
  
  
  
  
 118/68 (BP 1 Location: Left arm, BP Patient Position: Sitting) Vitals History BMI and BSA Data Body Mass Index Body Surface Area 30.04 kg/m 2 1.76 m 2 Preferred Pharmacy Pharmacy Name Phone [facility] [address]  [facility] [phone] Your Updated Medication List  
  
   
This list is accurate as of: 2/28/17  3:50 PM.  Always use your most recent med list.  
  
  
  
  
 COLACE 100 mg capsule Generic drug:  docusate sodium Take 100 mg by mouth two (2) times a day. * Diclofenac Potassium 50 mg Pwpk Commonly known as:  CAMBIA Take 1 Packet by mouth as needed. As directed * Diclofenac Potassium 50 mg Pwpk  
1 at HA onset and repeat in 2 hours x 1 prn MICROGESTIN FE 1.5/30 (28) 1.5 mg-30 mcg (21)/75 mg (7) Tab Generic drug:  norethindrone-ethinyl estradiol-iron  
  
 propranolol LA 80 mg SR capsule Commonly known as:  INDERAL LA Take 1 Cap by mouth daily. QNASL 80 mcg/actuation Hfaa Generic drug:  beclomethasone dipropionate ZYRTEC PO Take  by mouth. * Notice: This list has 2 medication(s) that are the same as other medications prescribed for you. Read the directions carefully, and ask your doctor or other care provider to review them with you. We Performed the Following VITAMIN B12 & FOLATE [31598 CPT(R)] Patient Instructions Lipoma: Care Instructions Your Care Instructions A lipoma is a growth of fat just below the skin. It may feel soft and rubbery. Lipomas can occur anywhere on the body. But they are most common on the torso, neck, upper thighs, upper arms, and armpits. A lipoma does not turn into cancer. Lipomas usually are not treated, because most of them don't hurt or cause problems. But your doctor may remove a lipoma if it is painful, gets infected, or bothers you. Follow-up care is a key part of your treatment and safety. Be sure to make and go to all appointments, and call your doctor if you are having problems. It's also a good idea to know your test results and keep a list of the medicines you take. How can you care for yourself at home? · Lipomas usually need no care at home. · If your doctor removes a lipoma, follow directions for taking care of the cut (incision). When should you call for help? Call your doctor now or seek immediate medical care if: 
· You have signs of infection, such as: 
¨ Increased pain, swelling, warmth, or redness. ¨ Red streaks leading from the lipoma. ¨ Pus draining from the lipoma. ¨ A fever. Watch closely for changes in your health, and be sure to contact your doctor if: 
· You want to discuss having a lipoma removed. Where can you learn more? Go to http://lalit-rose.info/. Enter H052 in the search box to learn more about \"Lipoma: Care Instructions. \" Current as of: July 1, 2016 Content Version: 11.1 © 9008-4970 [employer] ([employer]).  Care instructions adapted under license by Written (which disclaims liability or warranty for this information). If you have questions about a medical condition or this instruction, always ask your healthcare professional. [address] any warranty or liability for your use of this information. Introducing [facility] & HEALTH SERVICES! Dear Sabina Moritz: Thank you for requesting a Bulbstorm account. Our records indicate that you already have an active Bulbstorm account. You can access your account anytime at [email or web address] Did you know that you can access your hospital and ER discharge instructions at any time in Bulbstorm? You can also review all of your test results from your hospital stay or ER visit. Additional Information If you have questions, please visit the Frequently Asked Questions section of the Bulbstorm website at [email or web address] Remember, Bulbstorm is NOT to be used for urgent needs. For medical emergencies, dial 911. Now available from your iPhone and Android! Please provide this summary of care documentation to your next provider. Your primary care clinician is listed as Merrick Escamilla. If you have any questions after today's visit, please call [phone].

## 2017-02-28 NOTE — PATIENT INSTRUCTIONS
Lipoma: Care Instructions  Your Care Instructions  A lipoma is a growth of fat just below the skin. It may feel soft and rubbery. Lipomas can occur anywhere on the body. But they are most common on the torso, neck, upper thighs, upper arms, and armpits. A lipoma does not turn into cancer. Lipomas usually are not treated, because most of them don't hurt or cause problems. But your doctor may remove a lipoma if it is painful, gets infected, or bothers you. Follow-up care is a key part of your treatment and safety. Be sure to make and go to all appointments, and call your doctor if you are having problems. It's also a good idea to know your test results and keep a list of the medicines you take. How can you care for yourself at home? · Lipomas usually need no care at home. · If your doctor removes a lipoma, follow directions for taking care of the cut (incision). When should you call for help? Call your doctor now or seek immediate medical care if:  · You have signs of infection, such as:  ¨ Increased pain, swelling, warmth, or redness. ¨ Red streaks leading from the lipoma. ¨ Pus draining from the lipoma. ¨ A fever. Watch closely for changes in your health, and be sure to contact your doctor if:  · You want to discuss having a lipoma removed. Where can you learn more? Go to http://lalit-rose.info/. Enter A120 in the search box to learn more about \"Lipoma: Care Instructions. \"  Current as of: July 1, 2016  Content Version: 11.1  © 6331-0311 [employer]or[employer]. Care instructions adapted under license by [employer] (which disclaims liability or warranty for this information). If you have questions about a medical condition or this instruction, always ask your healthcare professional. [address] any warranty or liability for your use of this information.

## 2017-03-01 RX ORDER — CYANOCOBALAMIN 1000 UG/ML
1000 INJECTION, SOLUTION INTRAMUSCULAR; SUBCUTANEOUS ONCE
Qty: 1 ML | Refills: 0
Start: 2017-03-01 | End: 2017-03-01

## 2017-03-16 LAB
FOLATE SERPL-MCNC: 13.8 NG/ML
VIT B12 SERPL-MCNC: 357 PG/ML (ref 211–946)

## 2017-04-03 ENCOUNTER — TELEPHONE (OUTPATIENT)
Dept: NEUROLOGY | Age: 27
End: 2017-04-03

## 2017-04-10 ENCOUNTER — OFFICE VISIT (OUTPATIENT)
Dept: INTERNAL MEDICINE CLINIC | Age: 27
End: 2017-04-10

## 2017-04-10 VITALS
WEIGHT: 158.6 LBS | BODY MASS INDEX: 29.94 KG/M2 | RESPIRATION RATE: 18 BRPM | DIASTOLIC BLOOD PRESSURE: 79 MMHG | TEMPERATURE: 98.1 F | HEART RATE: 74 BPM | HEIGHT: 61 IN | SYSTOLIC BLOOD PRESSURE: 117 MMHG | OXYGEN SATURATION: 98 %

## 2017-04-10 DIAGNOSIS — E53.8 B12 DEFICIENCY: Primary | ICD-10-CM

## 2017-04-10 DIAGNOSIS — H92.02 EAR PAIN, LEFT: ICD-10-CM

## 2017-04-10 RX ORDER — CYANOCOBALAMIN 1000 UG/ML
1000 INJECTION, SOLUTION INTRAMUSCULAR; SUBCUTANEOUS ONCE
Qty: 1 ML | Refills: 0
Start: 2017-04-10 | End: 2017-04-10

## 2017-04-10 NOTE — MR AVS SNAPSHOT
Visit Information Date & Time Provider Department Dept. Phone Encounter #  
 4/10/2017  2:45 PM Derrick Avendano MD [facility] [address]  [id number] Follow-up Instructions Return in about 1 month (around 5/10/2017) for B12 injection. Your Appointments 4/14/2017 11:00 AM  
PROCEDURE with Walter Amos MD  
[facility] Appt Note: botox injection cl [place] [address] [facility] [address] [id number] [phone]  
  
   
 [place] [address] [id number] [address] Upcoming Health Maintenance Date Due DTaP/Tdap/Td series (1 - Tdap) 2/12/2009 PAP AKA CERVICAL CYTOLOGY 2/12/2009 Allergies as of 4/10/2017  Review Complete On: 4/10/2017 By: Simin Mcmillan Severity Noted Reaction Type Reactions Sulfur  04/15/2016    Unknown (comments) Current Immunizations  Never Reviewed No immunizations on file. Not reviewed this visit You Were Diagnosed With   
  
 Codes Comments B12 deficiency    -  Primary ICD-10-CM: E53.8 ICD-9-CM: 266.2 Vitals BP Pulse Temp Resp Height(growth percentile) Weight(growth percentile) 117/79 (BP 1 Location: Left arm, BP Patient Position: Sitting) 74 98.1 °F (36.7 °C) (Oral) 18 5' 1\" (1.549 m) 158 lb 9.6 oz (71.9 kg) LMP SpO2 BMI OB Status Smoking Status 04/06/2017 (Exact Date) 98% 29.97 kg/m2 Having regular periods Never Smoker BMI and BSA Data Body Mass Index Body Surface Area  
 29.97 kg/m 2 1.76 m 2 Preferred Pharmacy Pharmacy Name Phone [facility] 1 [address]  [facility] [phone] Your Updated Medication List  
  
   
This list is accurate as of: 4/10/17  3:00 PM.  Always use your most recent med list.  
  
  
  
  
 COLACE 100 mg capsule Generic drug:  docusate sodium Take 100 mg by mouth two (2) times a day. cyanocobalamin 1,000 mcg/mL injection Commonly known as:  VITAMIN B-12  
1 mL by IntraMUSCular route once for 1 dose. * Diclofenac Potassium 50 mg Pwpk Commonly known as:  CAMBIA Take 1 Packet by mouth as needed. As directed * Diclofenac Potassium 50 mg Pwpk  
1 at HA onset and repeat in 2 hours x 1 prn MICROGESTIN FE 1.5/30 (28) 1.5 mg-30 mcg (21)/75 mg (7) Tab Generic drug:  norethindrone-ethinyl estradiol-iron  
  
 onabotulinumtoxinA 200 unit injection Commonly known as:  BOTOX Inject 155 units to 31 fda approved sites to face and neck G43.919  
  
 propranolol LA 80 mg SR capsule Commonly known as:  INDERAL LA Take 1 Cap by mouth daily. QNASL 80 mcg/actuation Hfaa Generic drug:  beclomethasone dipropionate ZYRTEC PO Take  by mouth. * Notice: This list has 2 medication(s) that are the same as other medications prescribed for you. Read the directions carefully, and ask your doctor or other care provider to review them with you. We Performed the Following THER/PROPH/DIAG INJECTION, SUBCUT/IM [id number] CPT(R)] VITAMIN B12 INJECTION [ [facility]] Follow-up Instructions Return in about 1 month (around 5/10/2017) for B12 injection. Introducing [facility] & HEALTH SERVICES! Dear Omar Dhaliwal: Thank you for requesting a TVPage account. Our records indicate that you already have an active TVPage account. You can access your account anytime at [email or web address] Did you know that you can access your hospital and ER discharge instructions at any time in TVPage? You can also review all of your test results from your hospital stay or ER visit. Additional Information If you have questions, please visit the Frequently Asked Questions section of the TVPage website at [email or web address] Remember, TVPage is NOT to be used for urgent needs.  For medical emergencies, dial 911. Now available from your iPhone and Android! Please provide this summary of care documentation to your next provider. Your primary care clinician is listed as Rosalinda Mcwilliams. If you have any questions after today's visit, please call [phone].

## 2017-04-10 NOTE — PROGRESS NOTES
[facility] [place]  OFFICE PROCEDURE PROGRESS NOTE        Chart reviewed for the following:   Niesha Junior LPN, have reviewed the History, Physical and updated the Allergic reactions for [address] performed immediately prior to start of procedure:   Gus HERNANDEZ LPN, have performed the following reviews on [address] [facility] [address] prior to the start of the procedure:            * Patient was identified by name and date of birth   * Agreement on procedure being performed was verified  * Risks and Benefits explained to the patient  * Procedure site verified and marked as necessary  * Patient was positioned for comfort  * Consent was signed and verified     Time: 3:10 PM      Date of procedure: 4/10/2017    Procedure performed by:  Scot Dos Santos MD    Provider assisted by: ana    Patient assisted by: n/a    How tolerated by patient: tolerated well    Post Procedural Pain Scale: 0    Comments: none

## 2017-04-10 NOTE — PROGRESS NOTES
Gabino Figueroa is a 32 y.o. female who presents today for Injection B12  . She has a history of   Patient Active Problem List   Diagnosis Code    Nephrolithiasis N20.0    Allergic rhinitis J30.9    Family history of early CAD Z80.55    Migraine without aura and without status migrainosus, not intractable G43.009    B12 deficiency E53.8   . Today patient is here for b12 injection. she does have other concerns. Mild pain to L ear. No fevers/chills. Allergies are well controlled. Will be starting botox for her migraines. B12 deficiency: will continue monthly injections X 4, then try to swich to sublingual or intranasal.     ROS  Review of Systems   Constitutional: Negative for chills, fever, malaise/fatigue and weight loss. Eyes: Negative for blurred vision, double vision and photophobia. Respiratory: Negative for cough, hemoptysis and sputum production. Cardiovascular: Negative for chest pain, palpitations and orthopnea. Gastrointestinal: Negative for heartburn, nausea and vomiting. Genitourinary: Negative for dysuria and urgency. Skin: Negative for itching and rash. Psychiatric/Behavioral: Negative for depression and suicidal ideas. The patient does not have insomnia. Visit Vitals    /79 (BP 1 Location: Left arm, BP Patient Position: Sitting)    Pulse 74    Temp 98.1 °F (36.7 °C) (Oral)    Resp 18    Ht 5' 1\" (1.549 m)    Wt 158 lb 9.6 oz (71.9 kg)    SpO2 98%    BMI 29.97 kg/m2       Physical Exam   Constitutional: She is oriented to person, place, and time. She appears well-developed and well-nourished. HENT:   Head: Normocephalic and atraumatic. Cardiovascular: Normal rate and regular rhythm. No murmur heard. Pulmonary/Chest: Effort normal and breath sounds normal. No respiratory distress. Musculoskeletal: Normal range of motion. She exhibits no edema. Neurological: She is alert and oriented to person, place, and time.    Skin: Skin is warm and dry.   Psychiatric: She has a normal mood and affect. Her behavior is normal.         Current Outpatient Prescriptions   Medication Sig    cyanocobalamin (VITAMIN B-12) 1,000 mcg/mL injection 1 mL by IntraMUSCular route once for 1 dose.  onabotulinumtoxinA (BOTOX) 200 unit injection Inject 155 units to 31 fda approved sites to face and neck G43.919    propranolol LA (INDERAL LA) 80 mg SR capsule Take 1 Cap by mouth daily.  Diclofenac Potassium (CAMBIA) 50 mg pwpk Take 1 Packet by mouth as needed. As directed    MICROGESTIN FE 1.5/30, 28, 1.5 mg-30 mcg (21)/75 mg (7) tab     QNASL 80 mcg/actuation HFAA     CETIRIZINE HCL (ZYRTEC PO) Take  by mouth.  Diclofenac Potassium 50 mg pwpk 1 at HA onset and repeat in 2 hours x 1 prn    docusate sodium (COLACE) 100 mg capsule Take 100 mg by mouth two (2) times a day. No current facility-administered medications for this visit. Past Medical History:   Diagnosis Date    Calculus of kidney     Headache       Past Surgical History:   Procedure Laterality Date    HX LITHOTRIPSY        Social History   Substance Use Topics    Smoking status: Never Smoker    Smokeless tobacco: Not on file    Alcohol use Yes      Family History   Problem Relation Age of Onset    Cancer Father      Prostate    Headache Father     Heart Disease Father     Diabetes Father         Allergies   Allergen Reactions    Sulfur Unknown (comments)        Assessment/Plan  Enmanuel Acevedo was seen today for injection b12. Diagnoses and all orders for this visit:    B12 deficiency - Will give this one and then 3 more. Then hope to get down to oral or nasal B12.   -     VITAMIN B12 INJECTION ()  -     THER/PROPH/DIAG INJ, SC/IM ([id number])  -     cyanocobalamin (VITAMIN B-12) 1,000 mcg/mL injection; 1 mL by IntraMUSCular route once for 1 dose. Ear pain, left - PE normal. Monitor.          Follow-up Disposition:  Return in about 1 month (around 5/10/2017) for B12 injection.     Homer Ingram MD  4/10/2017

## 2017-04-13 ENCOUNTER — TELEPHONE (OUTPATIENT)
Dept: NEUROLOGY | Age: 27
End: 2017-04-13

## 2017-04-25 DIAGNOSIS — G43.919 INTRACTABLE MIGRAINE WITHOUT STATUS MIGRAINOSUS, UNSPECIFIED MIGRAINE TYPE: ICD-10-CM

## 2017-04-25 RX ORDER — PROPRANOLOL HYDROCHLORIDE 80 MG/1
CAPSULE, EXTENDED RELEASE ORAL
Qty: 30 CAP | Refills: 0 | Status: SHIPPED | OUTPATIENT
Start: 2017-04-25 | End: 2017-06-03 | Stop reason: SDUPTHER

## 2017-04-26 ENCOUNTER — OFFICE VISIT (OUTPATIENT)
Dept: NEUROLOGY | Age: 27
End: 2017-04-26

## 2017-04-26 VITALS
DIASTOLIC BLOOD PRESSURE: 64 MMHG | WEIGHT: 158 LBS | SYSTOLIC BLOOD PRESSURE: 110 MMHG | BODY MASS INDEX: 29.83 KG/M2 | HEIGHT: 61 IN

## 2017-04-26 DIAGNOSIS — G43.009 MIGRAINE WITHOUT AURA AND WITHOUT STATUS MIGRAINOSUS, NOT INTRACTABLE: Primary | ICD-10-CM

## 2017-04-26 NOTE — PROGRESS NOTES
Pain scale prior to procedure 0  /10  Pain scale post procedure   0  /10  Patient states 15-17  headaches a month & lasting  1-24  hrs  Been treated for headaches greater than 6 months  Consent signed     Instructions post injection discussed with patient   1. Do not lay flat for 4 hrs  2. Do not press on injection sites up to 24 hrs.     Patient verbalizes understanding

## 2017-04-26 NOTE — PROCEDURES
[facility] [place]  OFFICE PROCEDURE PROGRESS NOTE        Chart reviewed for the following:   Brionna Kevin MD, have reviewed the History, Physical and updated the Allergic reactions for [address] performed immediately prior to start of procedure:   Brionna Kevin MD, have performed the following reviews on Kaia Pineda prior to the start of the procedure:            * Patient was identified by name and date of birth   * Agreement on procedure being performed was verified  * Risks and Benefits explained to the patient  * Procedure site verified and marked as necessary  * Patient was positioned for comfort  * Consent was signed and verified     Time: 1520  Date of procedure: 4/26/2017  Procedure performed by:  Bhumi Joyce MD  Provider assisted by: None  Patient assisted by: Family  How tolerated by patient: tolerated the procedure well but with the following complications: Mild vasovagal reaction/ light-headed  Comments: None    Botox Injection Note    Indication:   Patient has chronic migraine who presents for first round of Botox injection for headache prevention. See clinic notes for details on medications tried/ failed. Procedure:    Botox concentration: 200 units in 4 ml of preservative-free normal saline. 31 sites injections, distribution as follow         Units/site Sites Sides subtotal  Procerus     5  1 1 5        5   1 2 10  Frontalis     5  2 2 20  Temporalis    5  4 2 40  Occipitalis    5  3 2 30   Upper cervical paraspinalis  5  2 2 20   Trapezius    5  3 2 30    200 units Botox were reconstituted, 155 units injected as above and the remainder was unavoidably wasted.     Patient tolerated procedure well.      _____________________________  Bhumi Joyce M.D.

## 2017-04-26 NOTE — PATIENT INSTRUCTIONS
OnabotulinumtoxinA (By injection)   OnabotulinumtoxinA (tr-e-dop-ig-ITM-gcb-tox-in-ay)  Treats muscle stiffness, muscle spasms, excessive sweating, overactive bladder, or loss of bladder control. Prevents chronic migraine headaches. Improves the appearance of wrinkles on the face. Brand Name(s): Botox, Botox Cosmetic   There may be other brand names for this medicine. When This Medicine Should Not Be Used: This medicine is not right for everyone. You should not receive this medicine if you had an allergic reaction to onabotulinumtoxinA or any other botulinum toxin product. How to Use This Medicine:   Injectable  · Your doctor will prescribe your exact dose and tell you how often it should be given. This medicine is given by a healthcare provider as a shot under your skin or into a muscle. · You may be given medicine to numb the area where the shot will be injected. If you receive the medicine around your eyes, you may be given eye drops or ointment to numb the area. After your injection, you may need to wear a protective contact lens or eye patch. · If you are being treated for excessive sweating, shave your underarms but do not use deodorant for 24 hours before your injection. Avoid exercise, hot foods or liquids, or anything else that could make you sweat for 30 minutes before your injection. · The recommended treatment schedule for chronic migraine is every 12 weeks. · This medicine works slowly. Once your condition has improved, the medicine will last about 3 months, then the effects will slowly go away. You might need more injections to treat your condition. ¨ Muscle spasms in the eyelids should improve within 3 to 10 days. ¨ Eye muscle problems should improve 1 or 2 days after the injection, and the improvement should last for 2 to 6 weeks. ¨ Neck pain should improve within 2 to 6 weeks. ¨ Arm stiffness should improve within 4 to 6 weeks.   ¨ Facial lines or wrinkles should improve 1 or 2 days.  · This medicine should come with a Medication Guide. Ask your pharmacist for a copy if you do not have one. · Missed dose:Call your doctor or pharmacist for instructions. Drugs and Foods to Avoid:   Ask your doctor or pharmacist before using any other medicine, including over-the-counter medicines, vitamins, and herbal products. · Some foods and medicine can affect how onabotulinumtoxinA works. Tell your doctor if you are using any of the following:  ¨ Aspirin or a blood thinner (such as ticlopidine, warfarin)  ¨ Muscle relaxer  ¨ Medicine for an infection (such as amikacin, gentamicin, streptomycin, tobramycin)  · Tell your doctor if you have received an injection of any botulinum toxin product within the past 4 months. Warnings While Using This Medicine:   · Tell your doctor if you are pregnant or breastfeeding, or if you have breathing or lung problems, bleeding problems, heart or blood vessel disease, or nerve or muscle problems (such as myasthenia gravis). Tell your doctor if you have ever had face surgery or if you have a urinary tract infection or trouble urinating, diabetes, or multiple sclerosis. · This medicine may cause the following problems:  ¨ Muscle weakness, loss of bladder control, trouble swallowing, speaking, or breathing (caused by the toxin spreading to other parts of your body)  · This medicine may make your muscles weak or cause vision problems. Do not drive or do anything else that could be dangerous until you know how this medicine affects you. · There are some warnings that only apply if you are receiving this medicine to treat the following:   ¨ Injections near the eye: This medicine may reduce blinking, which can raise the risk of eye problems such as corneal exposure and ulcers. Tell your doctor right away if you notice that you are blinking less than usual or your eyes feel dry. ¨ Urinary incontinence:  This medicine may cause autonomic dysreflexia, which can be a life-threatening condition. ¨ Overactive bladder: Check with your doctor right away if you have trouble urinating or a burning sensation while urinating. · This medicine contains products from donated human blood, so it may contain viruses, although the risk is low. Human donors and blood are always tested for viruses to keep the risk low. Talk with your doctor about this risk if you are concerned. · Your doctor will check your progress and the effects of this medicine at regular visits. Keep all appointments. Possible Side Effects While Using This Medicine:   Call your doctor right away if you notice any of these side effects:  · Allergic reaction: Itching or hives, swelling in your face or hands, swelling or tingling in your mouth or throat, chest tightness, trouble breathing  · Blurred or double vision, droopy eyelids  · Change in how much or how often you urinate, trouble urinating, or painful urination  · Chest pain, slow or uneven heartbeat  · Headache, increased sweating, warmth or redness in your face, neck, or arm  · Muscle weakness  · Trouble swallowing, talking, or breathing  If you notice these less serious side effects, talk with your doctor:   · Fever, chills, cough, stuffy or runny nose, sore throat, and body aches  · Pain in your neck, back, arms, or legs  · Redness, pain, tenderness, bruising, swelling, or weakness where the shot was given  If you notice other side effects that you think are caused by this medicine, tell your doctor. Call your doctor for medical advice about side effects. You may report side effects to FDA at 2-230-FDA-3782  © 2017 [facility] Information is for End User's use only and may not be sold, redistributed or otherwise used for commercial purposes. The above information is an  only. It is not intended as medical advice for individual conditions or treatments.  Talk to your doctor, nurse or pharmacist before following any medical regimen to see if it is safe and effective for you.

## 2017-04-26 NOTE — MR AVS SNAPSHOT
Visit Information Date & Time Provider Department Dept. Phone Encounter #  
 4/26/2017  3:20 PM Akbar Munoz MD Neurology [facility] [phone] [id number] Upcoming Health Maintenance Date Due DTaP/Tdap/Td series (1 - Tdap) 2/12/2009 PAP AKA CERVICAL CYTOLOGY 2/12/2009 Allergies as of 4/26/2017  Review Complete On: 4/10/2017 By: Miguel Ángel Naqvi MD  
  
 Severity Noted Reaction Type Reactions Sulfur  04/15/2016    Unknown (comments) Current Immunizations  Never Reviewed No immunizations on file. Not reviewed this visit Vitals BP Height(growth percentile) Weight(growth percentile) LMP BMI OB Status 110/64 5' 1\" (1.549 m) 158 lb (71.7 kg) 04/06/2017 (Exact Date) 29.85 kg/m2 Having regular periods Smoking Status Never Smoker BMI and BSA Data Body Mass Index Body Surface Area  
 29.85 kg/m 2 1.76 m 2 Preferred Pharmacy Pharmacy Name Phone [facility] [address] AT [address] [place] [address] [phone] Your Updated Medication List  
  
   
This list is accurate as of: 4/26/17  4:07 PM.  Always use your most recent med list.  
  
  
  
  
 COLACE 100 mg capsule Generic drug:  docusate sodium Take 100 mg by mouth two (2) times a day. * Diclofenac Potassium 50 mg Pwpk Commonly known as:  CAMBIA Take 1 Packet by mouth as needed. As directed * Diclofenac Potassium 50 mg Pwpk  
1 at HA onset and repeat in 2 hours x 1 prn MICROGESTIN FE 1.5/30 (28) 1.5 mg-30 mcg (21)/75 mg (7) Tab Generic drug:  norethindrone-ethinyl estradiol-iron  
  
 onabotulinumtoxinA 200 unit injection Commonly known as:  BOTOX Inject 155 units to 31 fda approved sites to face and neck G43.919  
  
 propranolol LA 80 mg SR capsule Commonly known as:  INDERAL LA  
TAKE 1 CAPSULE BY MOUTH DAILY  QNASL 80 mcg/actuation Hfaa  
 Generic drug:  beclomethasone dipropionate ZYRTEC PO Take  by mouth. * Notice: This list has 2 medication(s) that are the same as other medications prescribed for you. Read the directions carefully, and ask your doctor or other care provider to review them with you. Patient Instructions OnabotulinumtoxinA (By injection) OnabotulinumtoxinA (hg-l-mmd-nx-MGT-knq-tox-in-ay) Treats muscle stiffness, muscle spasms, excessive sweating, overactive bladder, or loss of bladder control. Prevents chronic migraine headaches. Improves the appearance of wrinkles on the face. Brand Name(s): Botox, Botox Cosmetic There may be other brand names for this medicine. When This Medicine Should Not Be Used: This medicine is not right for everyone. You should not receive this medicine if you had an allergic reaction to onabotulinumtoxinA or any other botulinum toxin product. How to Use This Medicine:  
Injectable · Your doctor will prescribe your exact dose and tell you how often it should be given. This medicine is given by a healthcare provider as a shot under your skin or into a muscle. · You may be given medicine to numb the area where the shot will be injected. If you receive the medicine around your eyes, you may be given eye drops or ointment to numb the area. After your injection, you may need to wear a protective contact lens or eye patch. · If you are being treated for excessive sweating, shave your underarms but do not use deodorant for 24 hours before your injection. Avoid exercise, hot foods or liquids, or anything else that could make you sweat for 30 minutes before your injection. · The recommended treatment schedule for chronic migraine is every 12 weeks. · This medicine works slowly. Once your condition has improved, the medicine will last about 3 months, then the effects will slowly go away. You might need more injections to treat your condition. ¨ Muscle spasms in the eyelids should improve within 3 to 10 days. ¨ Eye muscle problems should improve 1 or 2 days after the injection, and the improvement should last for 2 to 6 weeks. ¨ Neck pain should improve within 2 to 6 weeks. ¨ Arm stiffness should improve within 4 to 6 weeks. ¨ Facial lines or wrinkles should improve 1 or 2 days. · This medicine should come with a Medication Guide. Ask your pharmacist for a copy if you do not have one. · Missed dose:Call your doctor or pharmacist for instructions. Drugs and Foods to Avoid: Ask your doctor or pharmacist before using any other medicine, including over-the-counter medicines, vitamins, and herbal products. · Some foods and medicine can affect how onabotulinumtoxinA works. Tell your doctor if you are using any of the following: ¨ Aspirin or a blood thinner (such as ticlopidine, warfarin) ¨ Muscle relaxer ¨ Medicine for an infection (such as amikacin, gentamicin, streptomycin, tobramycin) · Tell your doctor if you have received an injection of any botulinum toxin product within the past 4 months. Warnings While Using This Medicine: · Tell your doctor if you are pregnant or breastfeeding, or if you have breathing or lung problems, bleeding problems, heart or blood vessel disease, or nerve or muscle problems (such as myasthenia gravis). Tell your doctor if you have ever had face surgery or if you have a urinary tract infection or trouble urinating, diabetes, or multiple sclerosis. · This medicine may cause the following problems: ¨ Muscle weakness, loss of bladder control, trouble swallowing, speaking, or breathing (caused by the toxin spreading to other parts of your body) · This medicine may make your muscles weak or cause vision problems. Do not drive or do anything else that could be dangerous until you know how this medicine affects you. · There are some warnings that only apply if you are receiving this medicine to treat the following: ¨ Injections near the eye: This medicine may reduce blinking, which can raise the risk of eye problems such as corneal exposure and ulcers. Tell your doctor right away if you notice that you are blinking less than usual or your eyes feel dry. ¨ Urinary incontinence: This medicine may cause autonomic dysreflexia, which can be a life-threatening condition. ¨ Overactive bladder: Check with your doctor right away if you have trouble urinating or a burning sensation while urinating. · This medicine contains products from donated human blood, so it may contain viruses, although the risk is low. Human donors and blood are always tested for viruses to keep the risk low. Talk with your doctor about this risk if you are concerned. · Your doctor will check your progress and the effects of this medicine at regular visits. Keep all appointments. Possible Side Effects While Using This Medicine:  
Call your doctor right away if you notice any of these side effects: · Allergic reaction: Itching or hives, swelling in your face or hands, swelling or tingling in your mouth or throat, chest tightness, trouble breathing · Blurred or double vision, droopy eyelids · Change in how much or how often you urinate, trouble urinating, or painful urination · Chest pain, slow or uneven heartbeat · Headache, increased sweating, warmth or redness in your face, neck, or arm · Muscle weakness · Trouble swallowing, talking, or breathing If you notice these less serious side effects, talk with your doctor: · Fever, chills, cough, stuffy or runny nose, sore throat, and body aches · Pain in your neck, back, arms, or legs · Redness, pain, tenderness, bruising, swelling, or weakness where the shot was given If you notice other side effects that you think are caused by this medicine, tell your doctor. Call your doctor for medical advice about side effects. You may report side effects to FDA at 5-242-JCT-8896 © 2017 [facility] Information is for End User's use only and may not be sold, redistributed or otherwise used for commercial purposes. The above information is an  only. It is not intended as medical advice for individual conditions or treatments. Talk to your doctor, nurse or pharmacist before following any medical regimen to see if it is safe and effective for you. Introducing [facility]! Dear Jade Ortiz: Thank you for requesting a [employer] account. Our records indicate that you already have an active [employer] account. You can access your account anytime at [email or web address] Did you know that you can access your hospital and ER discharge instructions at any time in [employer]? You can also review all of your test results from your hospital stay or ER visit. Additional Information If you have questions, please visit the Frequently Asked Questions section of the [employer] website at [email or web address] Remember, [employer] is NOT to be used for urgent needs. For medical emergencies, dial 911. Now available from your iPhone and Android! Please provide this summary of care documentation to your next provider. Your primary care clinician is listed as Spencer Nathan. If you have any questions after today's visit, please call [phone].

## 2017-05-15 ENCOUNTER — TELEPHONE (OUTPATIENT)
Dept: INTERNAL MEDICINE CLINIC | Age: 27
End: 2017-05-15

## 2017-05-15 NOTE — TELEPHONE ENCOUNTER
Pt wants to know if she is going to need to have labs done at her next visit. Requesting a return call from the nurse.  Please call [phone]

## 2017-05-30 ENCOUNTER — OFFICE VISIT (OUTPATIENT)
Dept: INTERNAL MEDICINE CLINIC | Age: 27
End: 2017-05-30

## 2017-05-30 VITALS
OXYGEN SATURATION: 98 % | WEIGHT: 163 LBS | HEART RATE: 82 BPM | HEIGHT: 61 IN | BODY MASS INDEX: 30.78 KG/M2 | DIASTOLIC BLOOD PRESSURE: 74 MMHG | TEMPERATURE: 98.9 F | SYSTOLIC BLOOD PRESSURE: 109 MMHG | RESPIRATION RATE: 16 BRPM

## 2017-05-30 DIAGNOSIS — E53.8 B12 DEFICIENCY: Primary | ICD-10-CM

## 2017-05-30 RX ORDER — CYANOCOBALAMIN 1000 UG/ML
1000 INJECTION, SOLUTION INTRAMUSCULAR; SUBCUTANEOUS ONCE
Qty: 1 ML | Refills: 0
Start: 2017-05-30 | End: 2017-05-30

## 2017-05-30 NOTE — PROGRESS NOTES
[facility]  OFFICE PROCEDURE PROGRESS NOTE        Chart reviewed for the following:   Ileana Guzmán LPN, have reviewed the History, Physical and updated the Allergic reactions for [address] performed immediately prior to start of procedure:   Tristan HERNANDEZ LPN, have performed the following reviews on [id number] [facility] [address] prior to the start of the procedure:            * Patient was identified by name and date of birth   * Agreement on procedure being performed was verified  * Risks and Benefits explained to the patient  * Procedure site verified and marked as necessary  * Patient was positioned for comfort  * Consent was signed and verified     Time: 4:15 pm      Date of procedure: 5/30/2017    Procedure performed by:  Hayley Montaño MD    Provider assisted by: [facility]     Patient assisted by: n/a    How tolerated by patient: tolerated well    Post Procedural Pain Scale: 0    Comments: None

## 2017-05-30 NOTE — PROGRESS NOTES
Horace Odonnell is a 32 y.o. female who presents today for Injection B12  . She has a history of   Patient Active Problem List   Diagnosis Code    Nephrolithiasis N20.0    Allergic rhinitis J30.9    Family history of early CAD Z80.55    Migraine without aura and without status migrainosus, not intractable G43.009    B12 deficiency E53.8   . Today patient is here for B12 injection. she does not have other concerns. B12 def: second of 4 additional injections. Pt did get her first botox injection. No recent migraines. No s/e from other shots. ROS  Review of Systems   Constitutional: Negative for chills, fever and weight loss. Eyes: Negative for blurred vision, double vision and photophobia. Respiratory: Negative for cough and hemoptysis. Cardiovascular: Negative for chest pain, palpitations and orthopnea. Gastrointestinal: Negative for abdominal pain, diarrhea, heartburn, nausea and vomiting. Genitourinary: Negative for dysuria. Skin: Negative for itching and rash. Visit Vitals    /74 (BP 1 Location: Left arm, BP Patient Position: Sitting)    Pulse 82    Temp 98.9 °F (37.2 °C) (Oral)    Resp 16    Ht 5' 1\" (1.549 m)    Wt 163 lb (73.9 kg)    SpO2 98%    BMI 30.8 kg/m2       Physical Exam   Constitutional: She appears well-developed. No distress. HENT:   Head: Normocephalic and atraumatic. Eyes: EOM are normal. Pupils are equal, round, and reactive to light. Neck: Normal range of motion. Neck supple. No thyromegaly present. Cardiovascular: Normal rate and regular rhythm. Pulmonary/Chest: Effort normal and breath sounds normal. No respiratory distress. Neurological: She is alert. Psychiatric: She has a normal mood and affect. Her behavior is normal.         Current Outpatient Prescriptions   Medication Sig    Bifidobacterium Infantis (ALIGN) 4 mg cap Take  by mouth.     cyanocobalamin (VITAMIN B-12) 1,000 mcg/mL injection 1 mL by IntraMUSCular route once for 1 dose.  onabotulinumtoxinA (BOTOX) 200 unit injection Inject im to 31 fda approved sites face and neck    propranolol LA (INDERAL LA) 80 mg SR capsule TAKE 1 CAPSULE BY MOUTH DAILY    onabotulinumtoxinA (BOTOX) 200 unit injection Inject 155 units to 31 fda approved sites to face and neck G43.919    Diclofenac Potassium (CAMBIA) 50 mg pwpk Take 1 Packet by mouth as needed. As directed    MICROGESTIN FE 1.5/30, 28, 1.5 mg-30 mcg (21)/75 mg (7) tab     QNASL 80 mcg/actuation HFAA     CETIRIZINE HCL (ZYRTEC PO) Take  by mouth.  docusate sodium (COLACE) 100 mg capsule Take 100 mg by mouth two (2) times a day. No current facility-administered medications for this visit. Past Medical History:   Diagnosis Date    Calculus of kidney     Headache       Past Surgical History:   Procedure Laterality Date    HX LITHOTRIPSY        Social History   Substance Use Topics    Smoking status: Never Smoker    Smokeless tobacco: Not on file    Alcohol use Yes      Family History   Problem Relation Age of Onset    Cancer Father      Prostate    Headache Father     Heart Disease Father     Diabetes Father         Allergies   Allergen Reactions    Sulfur Unknown (comments)        Assessment/Plan  Gloria Valdivia was seen today for injection b12. Diagnoses and all orders for this visit:    B12 deficiency - 2/4 before repeating level  -     VITAMIN B12 INJECTION ()  -     THER/PROPH/DIAG INJ, SC/IM ([id number])  -     cyanocobalamin (VITAMIN B-12) 1,000 mcg/mL injection; 1 mL by IntraMUSCular route once for 1 dose. Follow-up Disposition:  Return in about 1 month (around 6/30/2017) for B12 Injections.     Cecy Gaitan MD  5/30/2017

## 2017-06-03 DIAGNOSIS — G43.919 INTRACTABLE MIGRAINE WITHOUT STATUS MIGRAINOSUS, UNSPECIFIED MIGRAINE TYPE: ICD-10-CM

## 2017-06-05 RX ORDER — PROPRANOLOL HYDROCHLORIDE 80 MG/1
CAPSULE, EXTENDED RELEASE ORAL
Qty: 30 CAP | Refills: 0 | Status: SHIPPED | OUTPATIENT
Start: 2017-06-05 | End: 2017-06-23

## 2017-06-23 ENCOUNTER — OFFICE VISIT (OUTPATIENT)
Dept: NEUROLOGY | Age: 27
End: 2017-06-23

## 2017-06-23 ENCOUNTER — TELEPHONE (OUTPATIENT)
Dept: NEUROLOGY | Age: 27
End: 2017-06-23

## 2017-06-23 VITALS
BODY MASS INDEX: 30.78 KG/M2 | WEIGHT: 163 LBS | SYSTOLIC BLOOD PRESSURE: 90 MMHG | RESPIRATION RATE: 20 BRPM | HEIGHT: 61 IN | DIASTOLIC BLOOD PRESSURE: 60 MMHG

## 2017-06-23 DIAGNOSIS — G43.009 MIGRAINE WITHOUT AURA AND WITHOUT STATUS MIGRAINOSUS, NOT INTRACTABLE: ICD-10-CM

## 2017-06-23 DIAGNOSIS — G43.009 MIGRAINE WITHOUT AURA AND WITHOUT STATUS MIGRAINOSUS, NOT INTRACTABLE: Primary | ICD-10-CM

## 2017-06-23 NOTE — MR AVS SNAPSHOT
Visit Information Date & Time Provider Department Dept. Phone Encounter #  
 6/23/2017  1:00 PM Brandt Lindsey NP [facility] Neurology [facility] [phone] [id number] Follow-up Instructions Return after botox. Your Appointments 6/29/2017  2:45 PM  
ROUTINE CARE with Spencer Nathan MD  
[facility] [address] [place] [address] Appt Note: 1 mo B12 inj  
 [place] [address]  
[phone]  
  
   
 [address] [facility] [place] [address] Upcoming Health Maintenance Date Due DTaP/Tdap/Td series (1 - Tdap) 2/12/2009 PAP AKA CERVICAL CYTOLOGY 2/12/2009 INFLUENZA AGE 9 TO ADULT 8/1/2017 Allergies as of 6/23/2017  Review Complete On: 5/30/2017 By: Spencer Nathan MD  
  
 Severity Noted Reaction Type Reactions Sulfur  04/15/2016    Unknown (comments) Current Immunizations  Never Reviewed No immunizations on file. Not reviewed this visit You Were Diagnosed With   
  
 Codes Comments Migraine without aura and without status migrainosus, not intractable    -  Primary ICD-10-CM: G43.009 ICD-9-CM: 346.10 Vitals BP Resp Height(growth percentile) Weight(growth percentile) LMP BMI  
 90/60 20 5' 1\" (1.549 m) 163 lb (73.9 kg) 05/04/2017 30.8 kg/m2 OB Status Smoking Status Having regular periods Never Smoker Vitals History BMI and BSA Data Body Mass Index Body Surface Area  
 30.8 kg/m 2 1.78 m 2 Preferred Pharmacy Pharmacy Name Phone [facility] [address] AT [address] [facility] [phone] Your Updated Medication List  
  
   
This list is accurate as of: 6/23/17  1:21 PM.  Always use your most recent med list.  
  
  
  
  
 ALIGN 4 mg Cap Generic drug:  Bifidobacterium Infantis Take  by mouth. Diclofenac Potassium 50 mg Pwpk Commonly known as:  CAMBIA  
1 packet by mouth at headache onset. May repeat again in 2 hours X 1 dose. Max 2 doses in 24 hours MICROGESTIN FE 1.5/30 (28) 1.5 mg-30 mcg (21)/75 mg (7) Tab Generic drug:  norethindrone-ethinyl estradiol-iron * onabotulinumtoxinA 200 unit injection Commonly known as:  BOTOX Inject 155 units to 31 fda approved sites to face and neck G43.919  
  
 * onabotulinumtoxinA 200 unit injection Commonly known as:  BOTOX Inject im to 32 fda approved sites face and neck QNASL 80 mcg/actuation Hfaa Generic drug:  beclomethasone dipropionate ZYRTEC PO Take  by mouth. * Notice: This list has 2 medication(s) that are the same as other medications prescribed for you. Read the directions carefully, and ask your doctor or other care provider to review them with you. Prescriptions Sent to Pharmacy Refills Diclofenac Potassium (CAMBIA) 50 mg pwpk 5 Si packet by mouth at headache onset. May repeat again in 2 hours X 1 dose. Max 2 doses in 24 hours Class: Normal  
 Pharmacy: [facility] [address] [place] [address] [address] AT [facility] [address] ([facility] Ph #: [phone] Follow-up Instructions Return after botox. Patient Instructions Elsa Valentine 1721 What is a living will? A living will is a legal form you use to write down the kind of care you want at the end of your life. It is used by the health professionals who will treat you if you aren't able to decide for yourself. If you put your wishes in writing, your loved ones and others will know what kind of care you want. They won't need to guess. This can ease your mind and be helpful to others. A living will is not the same as an estate or property will. An estate will explains what you want to happen with your money and property after you die. Is a living will a legal document? A living will is a legal document. Each state has its own laws about living canales. If you move to another state, make sure that your living will is legal in the state where you now live. Or you might use a universal form that has been approved by many states. This kind of form can sometimes be completed and stored online. Your electronic copy will then be available wherever you have a connection to the Internet. In most cases, doctors will respect your wishes even if you have a form from a different state. · You don't need an  to complete a living will. But legal advice can be helpful if your state's laws are unclear, your health history is complicated, or your family can't agree on what should be in your living will. · You can change your living will at any time. Some people find that their wishes about end-of-life care change as their health changes. · In addition to making a living will, think about completing a medical power of  form. This form lets you name the person you want to make end-of-life treatment decisions for you (your \"health care agent\") if you're not able to. Many hospitals and nursing homes will give you the forms you need to complete a living will and a medical power of . · Your living will is used only if you can't make or communicate decisions for yourself anymore. If you become able to make decisions again, you can accept or refuse any treatment, no matter what you wrote in your living will. · Your state may offer an online registry. This is a place where you can store your living will online so the doctors and nurses who need to treat you can find it right away. What should you think about when creating a living will? Talk about your end-of-life wishes with your family members and your doctor. Let them know what you want. That way the people making decisions for you won't be surprised by your choices. Think about these questions as you make your living will: · Do you know enough about life support methods that might be used? If not, talk to your doctor so you know what might be done if you can't breathe on your own, your heart stops, or you're unable to swallow. · What things would you still want to be able to do after you receive life-support methods? Would you want to be able to walk? To speak? To eat on your own? To live without the help of machines? · If you have a choice, where do you want to be cared for? In your home? At a hospital or nursing home? · Do you want certain Alevism practices performed if you become very ill? · If you have a choice at the end of your life, where would you prefer to die? At home? In a hospital or nursing home? Somewhere else? · Would you prefer to be buried or cremated? · Do you want your organs to be donated after you die? What should you do with your living will? · Make sure that your family members and your health care agent have copies of your living will. · Give your doctor a copy of your living will to keep in your medical record. If you have more than one doctor, make sure that each one has a copy. · You may want to put a copy of your living will where it can be easily found. Where can you learn more? Go to http://lalit-rose.info/. Enter D092 in the search box to learn more about \"Learning About Living [place]. \" Current as of: August 8, 2016 Content Version: 11.3 © 8537-2081 [employer]. Care instructions adapted under license by [employer] (which disclaims liability or warranty for this information). If you have questions about a medical condition or this instruction, always ask your healthcare professional. [place] [address] any warranty or liability for your use of this information. Advance Directives: Care Instructions Your Care Instructions An advance directive is a legal way to state your wishes at the end of your life. It tells your family and your doctor what to do if you can no longer say what you want. There are two main types of advance directives. You can change them any time that your wishes change. · A living will tells your family and your doctor your wishes about life support and other treatment. · A durable power of  for health care lets you name a person to make treatment decisions for you when you can't speak for yourself. This person is called a health care agent. If you do not have an advance directive, decisions about your medical care may be made by a doctor or a  who doesn't know you. It may help to think of an advance directive as a gift to the people who care for you. If you have one, they won't have to make tough decisions by themselves. Follow-up care is a key part of your treatment and safety. Be sure to make and go to all appointments, and call your doctor if you are having problems. It's also a good idea to know your test results and keep a list of the medicines you take. How can you care for yourself at home? · Discuss your wishes with your loved ones and your doctor. This way, there are no surprises. · Many states have a unique form. Or you might use a universal form that has been approved by many states. This kind of form can sometimes be completed and stored online. Your electronic copy will then be available wherever you have a connection to the Internet. In most cases, doctors will respect your wishes even if you have a form from a different state. · You don't need a  to do an advance directive. But you may want to get legal advice. · Think about these questions when you prepare an advance directive: ¨ Who do you want to make decisions about your medical care if you are not able to? Many people choose a family member or close friend. ¨ Do you know enough about life support methods that might be used? If not, talk to your doctor so you understand. ¨ What are you most afraid of that might happen? You might be afraid of having pain, losing your independence, or being kept alive by machines. ¨ Where would you prefer to die? Choices include your home, a hospital, or a nursing home. ¨ Would you like to have information about hospice care to support you and your family? ¨ Do you want to donate organs when you die? ¨ Do you want certain Amish practices performed before you die? If so, put your wishes in the advance directive. · Read your advance directive every year, and make changes as needed. When should you call for help? Be sure to contact your doctor if you have any questions. Where can you learn more? Go to http://lalit-rose.info/. Enter R264 in the search box to learn more about \"Advance Directives: Care Instructions. \" Current as of: November 17, 2016 Content Version: 11.3 © 7535-3664 [employer]. Care instructions adapted under license by [employer] (which disclaims liability or warranty for this information). If you have questions about a medical condition or this instruction, always ask your healthcare professional. [place] [address] any warranty or liability for your use of this information. botox due after July 19 
[facility] [phone] Introducing [facility] HE[facility]! Dear Enmanuel Jaime: Thank you for requesting a [employer] account. Our records indicate that you already have an active [employer] account. You can access your account anytime at [email or web address] Did you know that you can access your hospital and ER discharge instructions at any time in [employer]? You can also review all of your test results from your hospital stay or ER visit. Additional Information If you have questions, please visit the Frequently Asked Questions section of the [employer] website at [email or web address]. Remember, MyChart is NOT to be used for urgent needs. For medical emergencies, dial 911. Now available from your iPhone and Android! Please provide this summary of care documentation to your next provider. Your primary care clinician is listed as Alee Rodriguez. If you have any questions after today's visit, please call [phone].

## 2017-06-23 NOTE — PROGRESS NOTES
Kaia Pineda is a 32 y.o. female who presents with the following  Chief Complaint   Patient presents with    Headache       HPI Patient comes in for a follow up for first botox treatment. She has been doing well since this treatment. Has only been having about 1-2 headaches a week down a lot from before the treatment. Has not really had a migraine since the treatment either. These headaches are usually aborted by the cambia usually after 1 dose. She does sometimes have to take 2 doses and this will completely abort. The headaches are no different then before. She has gone from over 15 headache days a month to about 8 or so a month which is a great decrease. Depending on stress, and outside triggers. Is now out of school for summer and will hopefully come down even further with next treatment. These headaches are not lasting as long either. She is also interesting in coming off Rewarding Return. Allergies   Allergen Reactions    Sulfur Unknown (comments)       Current Outpatient Prescriptions   Medication Sig    Diclofenac Potassium (CAMBIA) 50 mg pwpk 1 packet by mouth at headache onset. May repeat again in 2 hours X 1 dose. Max 2 doses in 24 hours    Bifidobacterium Infantis (ALIGN) 4 mg cap Take  by mouth.  onabotulinumtoxinA (BOTOX) 200 unit injection Inject im to 31 fda approved sites face and neck    onabotulinumtoxinA (BOTOX) 200 unit injection Inject 155 units to 31 fda approved sites to face and neck G43.919    MICROGESTIN FE 1.5/30, 28, 1.5 mg-30 mcg (21)/75 mg (7) tab     QNASL 80 mcg/actuation HFAA     CETIRIZINE HCL (ZYRTEC PO) Take  by mouth. No current facility-administered medications for this visit.         History   Smoking Status    Never Smoker   Smokeless Tobacco    Not on file       Past Medical History:   Diagnosis Date    Calculus of kidney     Headache        Past Surgical History:   Procedure Laterality Date    HX LITHOTRIPSY         Family History   Problem Relation Age of Onset    Cancer Father      Prostate    Headache Father     Heart Disease Father     Diabetes Father        Social History     Social History    Marital status: SINGLE     Spouse name: N/A    Number of children: N/A    Years of education: N/A     Social History Main Topics    Smoking status: Never Smoker    Smokeless tobacco: Not on file    Alcohol use Yes    Drug use: No    Sexual activity: Not Currently     Other Topics Concern    Not on file     Social History Narrative       Review of Systems   HENT: Negative for ear pain, hearing loss and tinnitus. Eyes: Negative for blurred vision, double vision and photophobia. Respiratory: Negative for shortness of breath and wheezing. Gastrointestinal: Negative for nausea and vomiting. Neurological: Positive for headaches. Negative for dizziness, tingling, tremors, seizures, loss of consciousness and weakness. Remainder of comprehensive review is negative. Physical Exam :    Visit Vitals    BP 90/60    Resp 20    Ht 5' 1\" (1.549 m)    Wt 73.9 kg (163 lb)    LMP 05/04/2017    BMI 30.8 kg/m2       General: Well defined, nourished, and groomed individual in no acute distress.    Neck: Supple, nontender, no bruits, no pain with resistance to active range of motion.    Heart: Regular rate and rhythm, no murmurs, rub, or gallop. Normal S1S2. Lungs: Clear to auscultation bilaterally with equal chest expansion, no cough, no wheeze  Musculoskeletal: Extremities revealed no edema and had full range of motion of joints.    Psych: Good mood and bright affect    NEUROLOGICAL EXAMINATION:    Mental Status: Alert and oriented to person, place, and time    Cranial Nerves:    II, III, IV, VI: Visual acuity grossly intact. Visual fields are normal.    Pupils are equal, round, and reactive to light and accommodation.    Extra-ocular movements are full and fluid. Fundoscopic exam was benign, no ptosis or nystagmus.    V-XII: Hearing is grossly intact. Facial features are symmetric, with normal sensation and strength. The palate rises symmetrically and the tongue protrudes midline. Sternocleidomastoids 5/5. Motor Examination: Normal tone, bulk, and strength, 5/5 muscle strength throughout. Coordination: Finger to nose was normal. No resting or intention tremor    Gait and Station: Steady while walking. Normal arm swing. No pronator drift. No muscle wasting or fasiculations noted. Reflexes: DTRs 2+ throughout. Results for orders placed or performed in visit on 17   VITAMIN B12 & FOLATE   Result Value Ref Range    Vitamin B12 357 211 - 946 pg/mL    Folate 13.8 >3.0 ng/mL       Orders Placed This Encounter    Diclofenac Potassium (CAMBIA) 50 mg pwpk     Si packet by mouth at headache onset. May repeat again in 2 hours X 1 dose. Max 2 doses in 24 hours     Dispense:  12 Packet     Refill:  5     Order Specific Question:   Expiration Date     Answer:   2018     Order Specific Question:   Lot#     Answer:        Order Specific Question:        Answer:   Depomed       1. Migraine without aura and without status migrainosus, not intractable        Follow-up Disposition:  Return after botox. Follow up botox treatment. Less frequent and less duration of headaches. She is only having about 8 a month now down about 6-7 headaches. Continue to use cambia for abortive therapy as this is working well usually just with one dose. As we continue botox things should continue to decrease. She has noticed good results after just 1 treatment. Continue to track understands her triggers. Call if she needs to get back on Inderal. Will let us know.        This note will not be viewable in [facility]

## 2017-06-23 NOTE — PROGRESS NOTES
Reviewed record in preparation for visit and have necessary documentation  Pt did not bring medication to office visit for review  Medication list reviewed and reconciled with patient  Information was given to pt on Advanced Directives, Living Will  opportunity was given for questions  Patient states   8-10  Headaches a month  Lasting  2 hrs  Been treated for headaches greater than 6 months  Medication used cambia

## 2017-06-23 NOTE — TELEPHONE ENCOUNTER
Patient [id number]  on   Patient in office today for follow up   Will need new PA for [id number]  botox [id number] will be delivered on 17

## 2017-06-29 ENCOUNTER — OFFICE VISIT (OUTPATIENT)
Dept: INTERNAL MEDICINE CLINIC | Age: 27
End: 2017-06-29

## 2017-06-29 VITALS
HEART RATE: 104 BPM | SYSTOLIC BLOOD PRESSURE: 123 MMHG | DIASTOLIC BLOOD PRESSURE: 83 MMHG | WEIGHT: 163 LBS | TEMPERATURE: 98.3 F | HEIGHT: 61 IN | BODY MASS INDEX: 30.78 KG/M2 | OXYGEN SATURATION: 98 % | RESPIRATION RATE: 16 BRPM

## 2017-06-29 DIAGNOSIS — E53.8 B12 DEFICIENCY: Primary | ICD-10-CM

## 2017-06-29 DIAGNOSIS — R68.89 THROAT CONGESTION: ICD-10-CM

## 2017-06-29 RX ORDER — CYANOCOBALAMIN 1000 UG/ML
1000 INJECTION, SOLUTION INTRAMUSCULAR; SUBCUTANEOUS ONCE
Qty: 1 ML | Refills: 0
Start: 2017-06-29 | End: 2017-06-29

## 2017-06-29 RX ORDER — AZELASTINE 1 MG/ML
1 SPRAY, METERED NASAL 2 TIMES DAILY
Qty: 1 BOTTLE | Refills: 3 | Status: SHIPPED | OUTPATIENT
Start: 2017-06-29 | End: 2017-10-03

## 2017-06-29 RX ORDER — PROPRANOLOL HYDROCHLORIDE 80 MG/1
CAPSULE, EXTENDED RELEASE ORAL
Refills: 0 | COMMUNITY
Start: 2017-06-05 | End: 2017-10-03

## 2017-06-29 NOTE — PROGRESS NOTES
Joaquina Colon is a 32 y.o. female who presents today for Injection B12  . She has a history of   Patient Active Problem List   Diagnosis Code    Nephrolithiasis N20.0    Allergic rhinitis J30.9    Family history of early CAD Z80.55    Migraine without aura and without status migrainosus, not intractable G43.009    B12 deficiency E53.8   . Today patient is here for B12 injection. she does not have other concerns.     B12 def: 3rd of 4 additional injections. No s/e from other shots. Continuing her botox with Neurology. Notes congestion/throat fullness which wakes her up at night. Taking Nasal steroid and oral antihistamine. Discussed adding a nasal antihistamine. Getting over a URI for which she took abx for. ROS  Review of Systems   Constitutional: Negative for chills, fever and weight loss. HENT: Negative for ear discharge, ear pain, hearing loss and tinnitus. Eyes: Negative for blurred vision, double vision, photophobia and pain. Respiratory: Positive for cough. Negative for hemoptysis, sputum production and shortness of breath. Cardiovascular: Negative for chest pain, palpitations, orthopnea, claudication and leg swelling. Gastrointestinal: Negative for abdominal pain, diarrhea, heartburn, nausea and vomiting. Skin: Negative for itching and rash. Neurological: Negative for headaches. Visit Vitals    /83 (BP 1 Location: Left arm, BP Patient Position: Sitting)    Pulse (!) 104    Temp 98.3 °F (36.8 °C) (Oral)    Resp 16    Ht 5' 1\" (1.549 m)    Wt 163 lb (73.9 kg)    SpO2 98%    BMI 30.8 kg/m2       Physical Exam   Constitutional: She is oriented to person, place, and time. She appears well-developed and well-nourished. HENT:   Head: Normocephalic and atraumatic. Right Ear: External ear normal.   Left Ear: External ear normal.   Mouth/Throat: Oropharynx is clear and moist.   Pulmonary/Chest: Effort normal. No respiratory distress. Neurological: She is alert and oriented to person, place, and time. Skin: Skin is warm and dry. Sun burned   Psychiatric: She has a normal mood and affect. Her behavior is normal.         Current Outpatient Prescriptions   Medication Sig    cyanocobalamin (VITAMIN B-12) 1,000 mcg/mL injection 1 mL by IntraMUSCular route once for 1 dose.  azelastine (ASTELIN) 137 mcg (0.1 %) nasal spray 1 [place] by Both Nostrils route two (2) times a day. Use in each nostril as directed    Diclofenac Potassium (CAMBIA) 50 mg pwpk 1 packet by mouth at headache onset. May repeat again in 2 hours X 1 dose. Max 2 doses in 24 hours    Bifidobacterium Infantis (ALIGN) 4 mg cap Take  by mouth.  onabotulinumtoxinA (BOTOX) 200 unit injection Inject 155 units to 31 fda approved sites to face and neck G43.919    MICROGESTIN FE 1.5/30, 28, 1.5 mg-30 mcg (21)/75 mg (7) tab     QNASL 80 mcg/actuation HFAA     CETIRIZINE HCL (ZYRTEC PO) Take  by mouth.  propranolol LA (INDERAL LA) 80 mg SR capsule TK 1 C PO D     No current facility-administered medications for this visit. Past Medical History:   Diagnosis Date    Calculus of kidney     Headache       Past Surgical History:   Procedure Laterality Date    HX LITHOTRIPSY        Social History   Substance Use Topics    Smoking status: Never Smoker    Smokeless tobacco: Never Used    Alcohol use Yes      Family History   Problem Relation Age of Onset    Cancer Father      Prostate    Headache Father     Heart Disease Father     Diabetes Father         Allergies   Allergen Reactions    Sulfur Unknown (comments)        Assessment/Plan  Lori Pablo was seen today for injection b12. Diagnoses and all orders for this visit:    B12 deficiency - 3/4 before repeating blood work. -     VITAMIN B12 INJECTION ()  -     THER/PROPH/DIAG INJ, SC/IM ([id number])  -     cyanocobalamin (VITAMIN B-12) 1,000 mcg/mL injection; 1 mL by IntraMUSCular route once for 1 dose.     Throat congestion - try nasal antihistamine. If this helps, could combine to do dymista. -     azelastine (ASTELIN) 137 mcg (0.1 %) nasal spray; 1 [place] by Both Nostrils route two (2) times a day. Use in each nostril as directed        Follow-up Disposition:  Return in about 2 weeks (around 7/13/2017).     Aminata Valle MD  6/29/2017

## 2017-06-29 NOTE — MR AVS SNAPSHOT
Visit Information Date & Time Provider Department Dept. Phone Encounter #  
 6/29/2017  2:45 PM Jessie Bey MD [facility] [address] [id number] Follow-up Instructions Return in about 2 weeks (around 7/13/2017). Upcoming Health Maintenance Date Due DTaP/Tdap/Td series (1 - Tdap) 2/12/2009 PAP AKA CERVICAL CYTOLOGY 2/12/2009 INFLUENZA AGE 9 TO ADULT 8/1/2017 Allergies as of 6/29/2017  Review Complete On: 6/29/2017 By: Jessie Bey MD  
  
 Severity Noted Reaction Type Reactions Sulfur  04/15/2016    Unknown (comments) Current Immunizations  Never Reviewed No immunizations on file. Not reviewed this visit You Were Diagnosed With   
  
 Codes Comments B12 deficiency    -  Primary ICD-10-CM: E53.8 ICD-9-CM: 266.2 Throat congestion     ICD-10-CM: R68.89 ICD-9-CM: 784.99 Vitals BP Pulse Temp Resp Height(growth percentile) Weight(growth percentile) 123/83 (BP 1 Location: Left arm, BP Patient Position: Sitting) (!) 104 98.3 °F (36.8 °C) (Oral) 16 5' 1\" (1.549 m) 163 lb (73.9 kg) LMP SpO2 BMI OB Status Smoking Status 06/29/2017 98% 30.8 kg/m2 Having regular periods Never Smoker Vitals History BMI and BSA Data Body Mass Index Body Surface Area  
 30.8 kg/m 2 1.78 m 2 Preferred Pharmacy Pharmacy Name Phone [facility] 1 [address] [facility] [phone] Your Updated Medication List  
  
   
This list is accurate as of: 6/29/17  2:56 PM.  Always use your most recent med list.  
  
  
  
  
 ALIGN 4 mg Cap Generic drug:  Bifidobacterium Infantis Take  by mouth. azelastine 137 mcg (0.1 %) nasal spray Commonly known as:  ASTELIN  
1 Spray by Both Nostrils route two (2) times a day. Use in each nostril as directed  
  
 cyanocobalamin 1,000 mcg/mL injection Commonly known as:  VITAMIN B-12  
1 mL by IntraMUSCular route once for 1 dose. Diclofenac Potassium 50 mg Pwpk Commonly known as:  CAMBIA  
1 packet by mouth at headache onset. May repeat again in 2 hours X 1 dose. Max 2 doses in 24 hours MICROGESTIN FE 1.5/30 (28) 1.5 mg-30 mcg (21)/75 mg (7) Tab Generic drug:  norethindrone-ethinyl estradiol-iron  
  
 onabotulinumtoxinA 200 unit injection Commonly known as:  BOTOX Inject 155 units to 31 fda approved sites to face and neck G43.919  
  
 propranolol LA 80 mg SR capsule Commonly known as:  INDERAL LA  
TK 1 C PO D  
  
 QNASL 80 mcg/actuation Hfaa Generic drug:  beclomethasone dipropionate ZYRTEC PO Take  by mouth. Prescriptions Sent to Pharmacy Refills  
 azelastine (ASTELIN) 137 mcg (0.1 %) nasal spray 3 [place] by Both Nostrils route two (2) times a day. Use in each nostril as directed Class: Normal  
 Pharmacy: [facility] [address] [place] [address] [address] AT [facility] of [address] ([facility] Ph #: [phone] Route: Both Nostrils We Performed the Following THER/PROPH/DIAG INJECTION, SUBCUT/IM [id number] CPT(R)] VITAMIN B12 INJECTION [ [facility]] Follow-up Instructions Return in about 2 weeks (around 2017). Introducing [facility] & HEALTH SERVICES! Dear Chacha Ashley: Thank you for requesting a Good People account. Our records indicate that you already have an active Good People account. You can access your account anytime at [email or web address] Did you know that you can access your hospital and ER discharge instructions at any time in Good People? You can also review all of your test results from your hospital stay or ER visit. Additional Information If you have questions, please visit the Frequently Asked Questions section of the Good People website at [email or web address] Remember, MyChart is NOT to be used for urgent needs. For medical emergencies, dial 911. Now available from your iPhone and Android! Please provide this summary of care documentation to your next provider. Your primary care clinician is listed as Lizabeth Aguiar. If you have any questions after today's visit, please call [phone].

## 2017-06-29 NOTE — PROGRESS NOTES
[facility] [place]  OFFICE PROCEDURE PROGRESS NOTE        Chart reviewed for the following:   Carlos Orta LPN, have reviewed the History, Physical and updated the Allergic reactions for [address] performed immediately prior to start of procedure:   Vasile HERNANDEZ LPN, have performed the following reviews on [address] [place] [address] [address] prior to the start of the procedure:            * Patient was identified by name and date of birth   * Agreement on procedure being performed was verified  * Risks and Benefits explained to the patient  * Procedure site verified and marked as necessary  * Patient was positioned for comfort  * Consent was signed and verified     Time: 3:00pm      Date of procedure: 6/29/2017    Procedure performed by:  Sanjuanita Davies MD    Provider assisted by: ana    Patient assisted by: n/a    How tolerated by patient: tolerated well    Post Procedural Pain Scale: 0    Comments: None

## 2017-06-30 ENCOUNTER — TELEPHONE (OUTPATIENT)
Dept: NEUROLOGY | Age: 27
End: 2017-06-30

## 2017-06-30 NOTE — TELEPHONE ENCOUNTER
Received [employer]IGEL [place] approval letter regarding Botox [id number]  Auth# [id number]  Effective 6/29/17-9/6/17  Request for Botox prescription sent to [facility]

## 2017-07-18 ENCOUNTER — TELEPHONE (OUTPATIENT)
Dept: NEUROLOGY | Age: 27
End: 2017-07-18

## 2017-07-18 NOTE — TELEPHONE ENCOUNTER
T./C contact naye finnegan w/ [facility] Botox injection [id number] [place] status   Auth# [id number]  Effective 2/20/17-8/20.17 tx 1     Jose Coffey LPN  informed of this matter

## 2017-07-20 ENCOUNTER — TELEPHONE (OUTPATIENT)
Dept: NEUROLOGY | Age: 27
End: 2017-07-20

## 2017-07-20 NOTE — TELEPHONE ENCOUNTER
----- Message from Malgorzata Hooker sent at 7/20/2017 12:20 PM EDT -----  Regarding: JOE Ortega/Telephone  Pt returnng call to nurse for the scheduling of her \"Botox inj\". Please give a call. Best contact [phone].

## 2017-07-24 ENCOUNTER — TELEPHONE (OUTPATIENT)
Dept: NEUROLOGY | Age: 27
End: 2017-07-24

## 2017-07-31 ENCOUNTER — OFFICE VISIT (OUTPATIENT)
Dept: NEUROLOGY | Age: 27
End: 2017-07-31

## 2017-07-31 VITALS
HEART RATE: 128 BPM | WEIGHT: 163 LBS | BODY MASS INDEX: 30.78 KG/M2 | DIASTOLIC BLOOD PRESSURE: 70 MMHG | OXYGEN SATURATION: 99 % | HEIGHT: 61 IN | SYSTOLIC BLOOD PRESSURE: 110 MMHG

## 2017-07-31 DIAGNOSIS — G43.009 MIGRAINE WITHOUT AURA AND WITHOUT STATUS MIGRAINOSUS, NOT INTRACTABLE: Primary | ICD-10-CM

## 2017-07-31 RX ORDER — ZOLMITRIPTAN 5 MG/1
1 SPRAY NASAL
Qty: 1 CONTAINER | Refills: 0 | Status: SHIPPED | COMMUNITY
Start: 2017-07-31 | End: 2017-10-03

## 2017-07-31 NOTE — PROCEDURES
[facility] NEUROLOGY CLINIC [place]  OFFICE PROCEDURE PROGRESS NOTE        Chart reviewed for the following:   Sue Cornejo MD, have reviewed the History, Physical and updated the Allergic reactions for [address] performed immediately prior to start of procedure:   Sue Cornejo MD, have performed the following reviews on [place] prior to the start of the procedure:            * Patient was identified by name and date of birth   * Agreement on procedure being performed was verified  * Risks and Benefits explained to the patient  * Procedure site verified and marked as necessary  * Patient was positioned for comfort  * Consent was signed and verified     Time: 6423  Date of procedure: 7/31/2017  Procedure performed by:  Siddhartha Riggs MD  Provider assisted by: None  Patient assisted by: father  How tolerated by patient: tolerated the procedure well with no complications  Comments: None    Botox Injection Note    Indication:   Patient has chronic migraine and presents for repeat Botox Injection as it has reduced overall headahce frequency including migraines (see clinic notes for details). Procedure:    Botox concentration: 200 units in 4 ml of preservative-free normal saline.   31 sites injections, distribution as follow         Units/site Sites Sides subtotal  Procerus     5  1 1 5        5   1 2 10  Frontalis     5  2 2 20  Temporalis    5  4 2 40  Occipitalis    5  3 2 30   Upper cervical paraspinalis  5  2 2 20   Trapezius    5  3 2 30    200 units Botox were reconstituted, 155 units injected as above and the remainder was unavoidably wasted    Patient tolerated procedure well.      _____________________________  Siddhartha Riggs M.D.

## 2017-07-31 NOTE — MR AVS SNAPSHOT
Visit Information Date & Time Provider Department Dept. Phone Encounter #  
 7/31/2017 11:20 AM [facility] [phone] [id number] Your Appointments 8/1/2017  2:45 PM  
ROUTINE CARE with Deena Jaimes MD  
[facility]) Appt Note: B12 injection [address] [place] [address] [address]  
[phone]  
  
   
 [address] [place] [address] Upcoming Health Maintenance Date Due DTaP/Tdap/Td series (1 - Tdap) 2/12/2009 PAP AKA CERVICAL CYTOLOGY 2/12/2009 INFLUENZA AGE 9 TO ADULT 8/1/2017 Allergies as of 7/31/2017  Review Complete On: 7/31/2017 By: Kriss Mcburney, LPN Severity Noted Reaction Type Reactions Sulfur  04/15/2016    Unknown (comments) Current Immunizations  Never Reviewed No immunizations on file. Not reviewed this visit You Were Diagnosed With   
  
 Codes Comments Migraine without aura and without status migrainosus, not intractable    -  Primary ICD-10-CM: G43.009 ICD-9-CM: 346.10 Vitals BP Pulse Height(growth percentile) Weight(growth percentile) SpO2 BMI  
 110/70 (BP 1 Location: Left arm, BP Patient Position: Sitting) (!) 128 5' 1\" (1.549 m) 163 lb (73.9 kg) 99% 30.8 kg/m2 OB Status Smoking Status Having regular periods Never Smoker BMI and BSA Data Body Mass Index Body Surface Area  
 30.8 kg/m 2 1.78 m 2 Preferred Pharmacy Pharmacy Name Phone [facility] 1 [address] AT [address] [place] [address] [phone] Your Updated Medication List  
  
   
This list is accurate as of: 7/31/17 12:03 PM.  Always use your most recent med list.  
  
  
  
  
 ALIGN 4 mg Cap Generic drug:  Bifidobacterium Infantis Take  by mouth. azelastine 137 mcg (0.1 %) nasal spray Commonly known as:  ASTELIN  
1 Spray by Both Nostrils route two (2) times a day. Use in each nostril as directed Diclofenac Potassium 50 mg Pwpk Commonly known as:  CAMBIA  
1 packet by mouth at headache onset. May repeat again in 2 hours X 1 dose. Max 2 doses in 24 hours MICROGESTIN FE 1.5/30 (28) 1.5 mg-30 mcg (21)/75 mg (7) Tab Generic drug:  norethindrone-ethinyl estradiol-iron  
  
 onabotulinumtoxinA 200 unit injection Commonly known as:  BOTOX Inject 155 units to 31 fda approved sites to face and neck every 12 [id number]  
  
 propranolol LA 80 mg SR capsule Commonly known as:  INDERAL LA  
TK 1 C PO D  
  
 QNASL 80 mcg/actuation Hfaa Generic drug:  beclomethasone dipropionate SUMAtriptan succinate 3 mg/0.5 mL Pnij Commonly known as:  [place] Plan  
3 mg by SubCUTAneous route as needed. For migraine. May repeat injection after 1 hour if migraine remains. Limit 2 injections in 24 hours ZOLMitriptan 5 mg nasal solution Commonly known as:  ZOMIG  
1 Spray by Nasal route once as needed for Migraine for up to 1 dose. ZYRTEC PO Take  by mouth. We Performed the Following [address] [id number] CPT(R)] Introducing [facility] & [facility] SERVICES! Dear Carlos Mcnamara: Thank you for requesting a Sentilla account. Our records indicate that you already have an active Sentilla account. You can access your account anytime at [email or web address] Did you know that you can access your hospital and ER discharge instructions at any time in Sentilla? You can also review all of your test results from your hospital stay or ER visit. Additional Information If you have questions, please visit the Frequently Asked Questions section of the Sentilla website at [email or web address] Remember, Sentilla is NOT to be used for urgent needs. For medical emergencies, dial 911. Now available from your iPhone and Android! Please provide this summary of care documentation to your next provider. Your primary care clinician is listed as Sohail Bacon. If you have any questions after today's visit, please call [phone].

## 2017-07-31 NOTE — PROGRESS NOTES
Pain scale prior to procedure  1 /10  Pain scale post procedure     1/10  Patient states 8  headaches a month & lasting less than an hr  Been treated for headaches greater than 6 months  Consent signed     Instructions post injection discussed with patient   1. Do not lay flat for 4 hrs  2. Do not press on injection sites up to 24 hrs.         What to expect  Possible bleeding and/or swelling  at injection sites      Patient verbalizes understanding

## 2017-08-01 ENCOUNTER — OFFICE VISIT (OUTPATIENT)
Dept: INTERNAL MEDICINE CLINIC | Age: 27
End: 2017-08-01

## 2017-08-01 ENCOUNTER — HOSPITAL ENCOUNTER (OUTPATIENT)
Dept: GENERAL RADIOLOGY | Age: 27
Discharge: HOME | End: 2017-08-01
Attending: INTERNAL MEDICINE
Payer: COMMERCIAL

## 2017-08-01 VITALS
HEIGHT: 61 IN | SYSTOLIC BLOOD PRESSURE: 112 MMHG | WEIGHT: 161.8 LBS | BODY MASS INDEX: 30.55 KG/M2 | TEMPERATURE: 98.5 F | RESPIRATION RATE: 16 BRPM | DIASTOLIC BLOOD PRESSURE: 80 MMHG | OXYGEN SATURATION: 98 % | HEART RATE: 83 BPM

## 2017-08-01 DIAGNOSIS — R31.9 HEMATURIA: ICD-10-CM

## 2017-08-01 DIAGNOSIS — Z87.442 HISTORY OF NEPHROLITHIASIS: ICD-10-CM

## 2017-08-01 DIAGNOSIS — R30.9 URINARY PAIN: ICD-10-CM

## 2017-08-01 DIAGNOSIS — E53.8 B12 DEFICIENCY: Primary | ICD-10-CM

## 2017-08-01 DIAGNOSIS — N30.90 CYSTITIS: ICD-10-CM

## 2017-08-01 LAB
BILIRUB UR QL STRIP: NEGATIVE
GLUCOSE UR-MCNC: NEGATIVE MG/DL
KETONES P FAST UR STRIP-MCNC: NEGATIVE MG/DL
PH UR STRIP: 8 [PH] (ref 4.6–8)
PROT UR QL STRIP: NORMAL MG/DL
SP GR UR STRIP: 1.02 (ref 1–1.03)
UA UROBILINOGEN AMB POC: NORMAL (ref 0.2–1)
URINALYSIS CLARITY POC: CLEAR
URINALYSIS COLOR POC: YELLOW
URINE BLOOD POC: NORMAL
URINE LEUKOCYTES POC: NORMAL
URINE NITRITES POC: NEGATIVE

## 2017-08-01 PROCEDURE — 74000 XR ABD (KUB): CPT

## 2017-08-01 RX ORDER — CYANOCOBALAMIN 1000 UG/ML
1000 INJECTION, SOLUTION INTRAMUSCULAR; SUBCUTANEOUS ONCE
Qty: 1 ML | Refills: 0
Start: 2017-08-01 | End: 2017-08-01

## 2017-08-01 RX ORDER — ACETAMINOPHEN, DIPHENHYDRAMINE HCL, PHENYLEPHRINE HCL 325; 25; 5 MG/1; MG/1; MG/1
5000 TABLET ORAL DAILY
Qty: 30 TAB | Refills: 5 | Status: SHIPPED | OUTPATIENT
Start: 2017-08-01 | End: 2018-03-29 | Stop reason: SDUPTHER

## 2017-08-01 RX ORDER — NITROFURANTOIN 25; 75 MG/1; MG/1
100 CAPSULE ORAL 2 TIMES DAILY
Qty: 14 CAP | Refills: 0 | Status: SHIPPED | OUTPATIENT
Start: 2017-08-01 | End: 2017-08-08

## 2017-08-01 RX ORDER — FLUCONAZOLE 150 MG/1
150 TABLET ORAL DAILY
Qty: 1 TAB | Refills: 0 | Status: SHIPPED | OUTPATIENT
Start: 2017-08-01 | End: 2017-08-02

## 2017-08-01 NOTE — PROGRESS NOTES
Nicole Pennington is a 32 y.o. female who presents today for Injection B12  . She has a history of   Patient Active Problem List   Diagnosis Code    Nephrolithiasis N20.0    Allergic rhinitis J30.9    Family history of early CAD Z80.55    Migraine without aura and without status migrainosus, not intractable G43.009    B12 deficiency E53.8   . Today patient is here for f/u. Urinary Problems:  Nicole Pennington is a 32 y.o. female who complains of frequency/urgency, hematuria x 3 days, without fever, chills, nausea or vomiting. Has occasional mild sharp pain to bilateral lower back. Urine has been cloudy/foul smelling.  her symptoms are moderate. she is drinking plenty of fluids for hydration. her history is significant for: kidney stones. reports that she does not currently engage in sexual activity. .    Patient's last menstrual period was 07/01/2017. B12 def: 4th of 4 additional injections. No s/e from other shots.      Throat Congestion: Added Nasal antihistamine at last visit. Doing better. ROS  Review of Systems   Constitutional: Negative for chills, fever and weight loss. HENT: Negative for congestion and sore throat. Eyes: Negative for blurred vision, double vision and photophobia. Respiratory: Negative for cough, hemoptysis, sputum production and shortness of breath. Cardiovascular: Negative for chest pain, palpitations and leg swelling. Gastrointestinal: Negative for abdominal pain, blood in stool, constipation, diarrhea, heartburn, nausea and vomiting. Genitourinary: Positive for flank pain (R side), frequency and urgency. Negative for dysuria. Musculoskeletal: Negative for joint pain, myalgias and neck pain. Skin: Negative for rash. Neurological: Negative. Negative for headaches. Endo/Heme/Allergies: Does not bruise/bleed easily. Psychiatric/Behavioral: Negative for depression, memory loss, substance abuse and suicidal ideas.        Visit Vitals    BP 112/80 (BP 1 Location: Left arm, BP Patient Position: Sitting)    Pulse 83    Temp 98.5 °F (36.9 °C) (Oral)    Resp 16    Ht 5' 1\" (1.549 m)    Wt 161 lb 12.8 oz (73.4 kg)    SpO2 98%    BMI 30.57 kg/m2       Physical Exam   Constitutional: She is oriented to person, place, and time. She appears well-developed and well-nourished. HENT:   Head: Normocephalic and atraumatic. Cardiovascular: Normal rate and regular rhythm. No murmur heard. Pulmonary/Chest: Effort normal. No respiratory distress. Abdominal: Soft. Bowel sounds are normal. She exhibits no distension. No CVAT   Neurological: She is alert and oriented to person, place, and time. Skin: Skin is warm and dry. Psychiatric: She has a normal mood and affect. Her behavior is normal.         Current Outpatient Prescriptions   Medication Sig    cyanocobalamin (VITAMIN B-12) 1,000 mcg/mL injection 1 mL by IntraMUSCular route once for 1 dose.  cyanocobalamin, vitamin B-12, (VITAMIN B-12) 5,000 mcg subl 5,000 mcg by SubLINGual route daily.  nitrofurantoin, macrocrystal-monohydrate, (MACROBID) 100 mg capsule Take 1 Cap by mouth two (2) times a day for 7 days.  fluconazole (DIFLUCAN) 150 mg tablet Take 1 Tab by mouth daily for 1 day. FDA advises cautious prescribing of oral fluconazole in pregnancy.  SUMAtriptan succinate (ZEMBRACE SYMTOUCH) 3 mg/0.5 mL pnij 3 mg by SubCUTAneous route as needed. For migraine. May repeat injection after 1 hour if migraine remains. Limit 2 injections in 24 hours    ZOLMitriptan (ZOMIG) 5 mg nasal solution 1 Spray by Nasal route once as needed for Migraine for up to 1 dose.  onabotulinumtoxinA (BOTOX) 200 unit injection Inject 155 units to 31 fda approved sites to face and neck every 12 [id number]    Diclofenac Potassium (CAMBIA) 50 mg pwpk 1 packet by mouth at headache onset. May repeat again in 2 hours X 1 dose.  Max 2 doses in 24 hours    Bifidobacterium Infantis (ALIGN) 4 mg cap Take  by mouth.    MICROGESTIN FE 1.5/30, 28, 1.5 mg-30 mcg (21)/75 mg (7) tab     QNASL 80 mcg/actuation HFAA     CETIRIZINE HCL (ZYRTEC PO) Take  by mouth.  propranolol LA (INDERAL LA) 80 mg SR capsule TK 1 C PO D    azelastine (ASTELIN) 137 mcg (0.1 %) nasal spray 1 [place] by Both Nostrils route two (2) times a day. Use in each nostril as directed     No current facility-administered medications for this visit. Past Medical History:   Diagnosis Date    Calculus of kidney     Headache       Past Surgical History:   Procedure Laterality Date    HX LITHOTRIPSY        Social History   Substance Use Topics    Smoking status: Never Smoker    Smokeless tobacco: Never Used    Alcohol use Yes      Family History   Problem Relation Age of Onset    Cancer Father      Prostate    Headache Father     Heart Disease Father     Diabetes Father         Allergies   Allergen Reactions    Salicylic Acid-Sulfur Anaphylaxis    Sulfur Unknown (comments)        Assessment/Plan  Diagnoses and all orders for this visit:    1. B12 deficiency - Injection today. Repeat levels in 2 weeks. Start sublingual B12. If levels are good, repeat in 3-6 mos. -     VITAMIN B12 INJECTION ()  -     THER/PROPH/DIAG INJ, SC/IM ([id number])  -     cyanocobalamin (VITAMIN B-12) 1,000 mcg/mL injection; 1 mL by IntraMUSCular route once for 1 dose. -     cyanocobalamin, vitamin B-12, (VITAMIN B-12) 5,000 mcg subl; 5,000 mcg by SubLINGual route daily. -     VITAMIN B12    2. Urinary pain - Some flank pain. UA with blood, protein and leuks. Favor UTI vs. Stone given symptoms, but will get KUB. Continue to filter urine. Treat for UTI and send for culture and UA   -     AMB POC URINALYSIS DIP STICK AUTO W/O MICRO  -     XR ABD (KUB); Future    3. History of nephrolithiasis  -     XR ABD (KUB); Future    4. Cystitis  -     nitrofurantoin, macrocrystal-monohydrate, (MACROBID) 100 mg capsule;  Take 1 Cap by mouth two (2) times a day for 7 days.  -     fluconazole (DIFLUCAN) 150 mg tablet; Take 1 Tab by mouth daily for 1 day. FDA advises cautious prescribing of oral fluconazole in pregnancy. 5. Hematuria  -     XR ABD (KUB); Future        Follow-up Disposition:  Return in about 3 months (around 11/1/2017).     Tiffanie Hayes MD  8/1/2017

## 2017-08-01 NOTE — PROGRESS NOTES
[facility] [place]  OFFICE PROCEDURE PROGRESS NOTE        Chart reviewed for the following:   Blake Jimenez LPN, have reviewed the History, Physical and updated the Allergic reactions for [address] performed immediately prior to start of procedure:   Cammy HERNANDEZ LPN, have performed the following reviews on [address] [facility] [address] prior to the start of the procedure:            * Patient was identified by name and date of birth   * Agreement on procedure being performed was verified  * Risks and Benefits explained to the patient  * Procedure site verified and marked as necessary  * Patient was positioned for comfort  * Consent was signed and verified     Time: 3:15 pm      Date of procedure: 8/1/2017    Procedure performed by:  Vanesa Alberts MD    Provider assisted by:  ana    Patient assisted by: n/a    How tolerated by patient: tolerated well    Post Procedural Pain Scale: 0    Comments: None

## 2017-08-02 LAB
APPEARANCE UR: CLEAR
BACTERIA #/AREA URNS HPF: NORMAL /[HPF]
BILIRUB UR QL STRIP: NEGATIVE
CASTS URNS QL MICRO: NORMAL /LPF
COLOR UR: YELLOW
EPI CELLS #/AREA URNS HPF: NORMAL /HPF
GLUCOSE UR QL: NEGATIVE
HGB UR QL STRIP: ABNORMAL
KETONES UR QL STRIP: NEGATIVE
LEUKOCYTE ESTERASE UR QL STRIP: ABNORMAL
MICRO URNS: ABNORMAL
MUCOUS THREADS URNS QL MICRO: PRESENT
NITRITE UR QL STRIP: NEGATIVE
PH UR STRIP: 8 [PH] (ref 5–7.5)
PROT UR QL STRIP: NEGATIVE
RBC #/AREA URNS HPF: NORMAL /HPF
SP GR UR: 1.02 (ref 1–1.03)
UROBILINOGEN UR STRIP-MCNC: 1 MG/DL (ref 0.2–1)
WBC #/AREA URNS HPF: NORMAL /HPF

## 2017-08-03 LAB — BACTERIA UR CULT: NORMAL

## 2017-08-19 LAB — VIT B12 SERPL-MCNC: >2000 PG/ML (ref 211–946)

## 2017-10-03 ENCOUNTER — OFFICE VISIT (OUTPATIENT)
Dept: NEUROLOGY | Age: 27
End: 2017-10-03

## 2017-10-03 VITALS
HEIGHT: 61 IN | DIASTOLIC BLOOD PRESSURE: 82 MMHG | SYSTOLIC BLOOD PRESSURE: 112 MMHG | BODY MASS INDEX: 30.58 KG/M2 | WEIGHT: 162 LBS

## 2017-10-03 DIAGNOSIS — G43.009 MIGRAINE WITHOUT AURA AND WITHOUT STATUS MIGRAINOSUS, NOT INTRACTABLE: ICD-10-CM

## 2017-10-03 NOTE — MR AVS SNAPSHOT
Visit Information Date & Time Provider Department Dept. Phone Encounter #  
 10/3/2017  3:30 PM Jeevan Eaton NP [facility] [phone] [id number] Follow-up Instructions Return after botox. Upcoming Health Maintenance Date Due DTaP/Tdap/Td series (1 - Tdap) 2/12/2009 PAP AKA CERVICAL CYTOLOGY 2/12/2009 INFLUENZA AGE 9 TO ADULT 8/1/2017 Allergies as of 10/3/2017  Review Complete On: 10/3/2017 By: Montserrat Lamb Severity Noted Reaction Type Reactions Salicylic Acid-sulfur High 07/06/2017    Anaphylaxis Sulfur  04/15/2016    Unknown (comments) Current Immunizations  Never Reviewed No immunizations on file. Not reviewed this visit You Were Diagnosed With   
  
 Codes Comments Migraine without aura and without status migrainosus, not intractable     ICD-10-CM: D21.037 ICD-9-CM: 346.10 Vitals BP Height(growth percentile) Weight(growth percentile) BMI OB Status Smoking Status 112/82 5' 1\" (1.549 m) 162 lb (73.5 kg) 30.61 kg/m2 Having regular periods Never Smoker Vitals History BMI and BSA Data Body Mass Index Body Surface Area  
 30.61 kg/m 2 1.78 m 2 Preferred Pharmacy Pharmacy Name Phone [facility] [address] [place] [address] [address] AT [address] [place] [address] [phone] Your Updated Medication List  
  
   
This list is accurate as of: 10/3/17  4:09 PM.  Always use your most recent med list.  
  
  
  
  
 cyanocobalamin (vitamin B-12) 5,000 mcg Subl Commonly known as:  VITAMIN B-12  
5,000 mcg by SubLINGual route daily. Diclofenac Potassium 50 mg Pwpk Commonly known as:  CAMBIA  
1 packet by mouth at headache onset. May repeat again in 2 hours X 1 dose. Max 2 doses in 24 hours MICROGESTIN FE 1.5/30 (28) 1.5 mg-30 mcg (21)/75 mg (7) Tab Generic drug:  norethindrone-ethinyl estradiol-iron onabotulinumtoxinA 200 unit injection Commonly known as:  BOTOX Inject 155 units to 31 fda approved sites to face and neck every 12 [id number]  
  
 QNASL 80 mcg/actuation Hfaa Generic drug:  beclomethasone dipropionate ZYRTEC PO Take  by mouth. Prescriptions Sent to Pharmacy Refills Diclofenac Potassium (CAMBIA) 50 mg pwpk 5 Si packet by mouth at headache onset. May repeat again in 2 hours X 1 dose. Max 2 doses in 24 hours Class: Normal  
 Pharmacy: [facility] [address] [place] [address] [id number] AT [facility] of [address] Ph #: [phone] Follow-up Instructions Return after botox. Patient Instructions PRESCRIPTION REFILL POLICY [facility] Statement to Patients 2014 In an effort to ensure the large volume of patient prescription refills is processed in the most efficient and expeditious manner, we are asking our patients to assist us by calling your Pharmacy for all prescription refills, this will include also your  Mail Order Pharmacy. The pharmacy will contact our office electronically to continue the refill process. Please do not wait until the last minute to call your pharmacy. We need at least 48 hours (2days) to fill prescriptions. We also encourage you to call your pharmacy before going to  your prescription to make sure it is ready. With regard to controlled substance prescription refill requests (narcotic refills) that need to be picked up at our office, we ask your cooperation by providing us with at least 72 hours (3days) notice that you will need a refill. We will not refill narcotic prescription refill requests after 4:00pm on any weekday, Monday through Thursday, or after 2:00pm on , or on the weekends.   
  
We encourage everyone to explore another way of getting your prescription refill request processed using Celaton, our patient web portal through our electronic medical record system. Celaton is an efficient and effective way to communicate your medication request directly to the office and  downloadable as an nitza on your smart phone . Celaton also features a review functionality that allows you to view your medication list as well as leave messages for your physician. Are you ready to get connected? If so please review the attatched instructions or speak to any of our staff to get you set up right away! Thank you so much for your cooperation. Should you have any questions please contact our Practice Administrator. The Physicians and Staff,  [facility] Elsa Valentine [address] What is a living will? A living will is a legal form you use to write down the kind of care you want at the end of your life. It is used by the health professionals who will treat you if you aren't able to decide for yourself. If you put your wishes in writing, your loved ones and others will know what kind of care you want. They won't need to guess. This can ease your mind and be helpful to others. A living will is not the same as an estate or property will. An estate will explains what you want to happen with your money and property after you die. Is a living will a legal document? A living will is a legal document. Each state has its own laws about living canales. If you move to another state, make sure that your living will is legal in the state where you now live. Or you might use a universal form that has been approved by many states. This kind of form can sometimes be completed and stored online. Your electronic copy will then be available wherever you have a connection to the Internet. In most cases, doctors will respect your wishes even if you have a form from a different state. · You don't need an  to complete a living will.  But legal advice can be helpful if your state's laws are unclear, your health history is complicated, or your family can't agree on what should be in your living will. · You can change your living will at any time. Some people find that their wishes about end-of-life care change as their health changes. · In addition to making a living will, think about completing a medical power of  form. This form lets you name the person you want to make end-of-life treatment decisions for you (your \"health care agent\") if you're not able to. Many hospitals and nursing homes will give you the forms you need to complete a living will and a medical power of . · Your living will is used only if you can't make or communicate decisions for yourself anymore. If you become able to make decisions again, you can accept or refuse any treatment, no matter what you wrote in your living will. · Your state may offer an online registry. This is a place where you can store your living will online so the doctors and nurses who need to treat you can find it right away. What should you think about when creating a living will? Talk about your end-of-life wishes with your family members and your doctor. Let them know what you want. That way the people making decisions for you won't be surprised by your choices. Think about these questions as you make your living will: · Do you know enough about life support methods that might be used? If not, talk to your doctor so you know what might be done if you can't breathe on your own, your heart stops, or you're unable to swallow. · What things would you still want to be able to do after you receive life-support methods? Would you want to be able to walk? To speak? To eat on your own? To live without the help of machines? · If you have a choice, where do you want to be cared for? In your home? At a hospital or nursing home? · Do you want certain Scientologist practices performed if you become very ill? · If you have a choice at the end of your life, where would you prefer to die? At home? In a hospital or nursing home? Somewhere else? · Would you prefer to be buried or cremated? · Do you want your organs to be donated after you die? What should you do with your living will? · Make sure that your family members and your health care agent have copies of your living will. · Give your doctor a copy of your living will to keep in your medical record. If you have more than one doctor, make sure that each one has a copy. · You may want to put a copy of your living will where it can be easily found. Where can you learn more? Go to http://lalit-rose.info/. Enter I400 in the search box to learn more about \"Learning About Living [place]. \" Current as of: August 8, 2016 Content Version: 11.3 © 0712-2887 [employer]UOFFER. Care instructions adapted under license by [employer] (which disclaims liability or warranty for this information). If you have questions about a medical condition or this instruction, always ask your healthcare professional. [place] [address] any warranty or liability for your use of this information. Advance Directives: Care Instructions Your Care Instructions An advance directive is a legal way to state your wishes at the end of your life. It tells your family and your doctor what to do if you can no longer say what you want. There are two main types of advance directives. You can change them any time that your wishes change. · A living will tells your family and your doctor your wishes about life support and other treatment. · A durable power of  for health care lets you name a person to make treatment decisions for you when you can't speak for yourself. This person is called a health care agent.  
If you do not have an advance directive, decisions about your medical care may be made by a doctor or a  who doesn't know you. It may help to think of an advance directive as a gift to the people who care for you. If you have one, they won't have to make tough decisions by themselves. Follow-up care is a key part of your treatment and safety. Be sure to make and go to all appointments, and call your doctor if you are having problems. It's also a good idea to know your test results and keep a list of the medicines you take. How can you care for yourself at home? · Discuss your wishes with your loved ones and your doctor. This way, there are no surprises. · Many states have a unique form. Or you might use a universal form that has been approved by many states. This kind of form can sometimes be completed and stored online. Your electronic copy will then be available wherever you have a connection to the Internet. In most cases, doctors will respect your wishes even if you have a form from a different state. · You don't need a  to do an advance directive. But you may want to get legal advice. · Think about these questions when you prepare an advance directive: ¨ Who do you want to make decisions about your medical care if you are not able to? Many people choose a family member or close friend. ¨ Do you know enough about life support methods that might be used? If not, talk to your doctor so you understand. ¨ What are you most afraid of that might happen? You might be afraid of having pain, losing your independence, or being kept alive by machines. ¨ Where would you prefer to die? Choices include your home, a hospital, or a nursing home. ¨ Would you like to have information about hospice care to support you and your family? ¨ Do you want to donate organs when you die? ¨ Do you want certain Yazidi practices performed before you die? If so, put your wishes in the advance directive. · Read your advance directive every year, and make changes as needed. When should you call for help? Be sure to contact your doctor if you have any questions. Where can you learn more? Go to http://lalit-rose.info/. Enter R264 in the search box to learn more about \"Advance Directives: Care Instructions. \" Current as of: November 17, 2016 Content Version: 11.3 © 9613-1772 [employer]. Care instructions adapted under license by [employer] (which disclaims liability or warranty for this information). If you have questions about a medical condition or this instruction, always ask your healthcare professional. [address] any warranty or liability for your use of this information. Introducing [facility] & HEALTH SERVICES! Dear Esteban Bond: Thank you for requesting a [employer] account. Our records indicate that you already have an active [employer] account. You can access your account anytime at [email or web address] Did you know that you can access your hospital and ER discharge instructions at any time in [employer]? You can also review all of your test results from your hospital stay or ER visit. Additional Information If you have questions, please visit the Frequently Asked Questions section of the [employer] website at [email or web address] Remember, [employer] is NOT to be used for urgent needs. For medical emergencies, dial 911. Now available from your iPhone and Android! Please provide this summary of care documentation to your next provider. Your primary care clinician is listed as Vic Whitley. If you have any questions after today's visit, please call [phone].

## 2017-10-03 NOTE — PROGRESS NOTES
Pt is here for botox follow up. Pt has 1-2 headaches a week lasting 1 hour minimum. Pt takes cambia to prevent headaches. Botox injections helps decrease headaches. Since pt stopped propranolol she has started clenching her jaw at night and sometimes during the day. Pt thinks it is stress related.

## 2017-10-03 NOTE — PATIENT INSTRUCTIONS
[address] [facility]   Statement to Patients  April 1, 2014      In an effort to ensure the large volume of patient prescription refills is processed in the most efficient and expeditious manner, we are asking our patients to assist us by calling your Pharmacy for all prescription refills, this will include also your  Mail Order Pharmacy. The pharmacy will contact our office electronically to continue the refill process. Please do not wait until the last minute to call your pharmacy. We need at least 48 hours (2days) to fill prescriptions. We also encourage you to call your pharmacy before going to  your prescription to make sure it is ready. With regard to controlled substance prescription refill requests (narcotic refills) that need to be picked up at our office, we ask your cooperation by providing us with at least 72 hours (3days) notice that you will need a refill. We will not refill narcotic prescription refill requests after 4:00pm on any weekday, Monday through Thursday, or after 2:00pm on Fridays, or on the weekends. We encourage everyone to explore another way of getting your prescription refill request processed using Capeco, our patient web portal through our electronic medical record system. Capeco is an efficient and effective way to communicate your medication request directly to the office and  downloadable as an nitza on your smart phone . Capeco also features a review functionality that allows you to view your medication list as well as leave messages for your physician. Are you ready to get connected? If so please review the attatched instructions or speak to any of our staff to get you set up right away! Thank you so much for your cooperation. Should you have any questions please contact our Practice Administrator. The Physicians and Staff,  [facility] [address] [address]  What is a living will?   A living will is a legal form you use to write down the kind of care you want at the end of your life. It is used by the health professionals who will treat you if you aren't able to decide for yourself. If you put your wishes in writing, your loved ones and others will know what kind of care you want. They won't need to guess. This can ease your mind and be helpful to others. A living will is not the same as an estate or property will. An estate will explains what you want to happen with your money and property after you die. Is a living will a legal document? A living will is a legal document. Each state has its own laws about living canales. If you move to another state, make sure that your living will is legal in the state where you now live. Or you might use a universal form that has been approved by many states. This kind of form can sometimes be completed and stored online. Your electronic copy will then be available wherever you have a connection to the Internet. In most cases, doctors will respect your wishes even if you have a form from a different state. · You don't need an  to complete a living will. But legal advice can be helpful if your state's laws are unclear, your health history is complicated, or your family can't agree on what should be in your living will. · You can change your living will at any time. Some people find that their wishes about end-of-life care change as their health changes. · In addition to making a living will, think about completing a medical power of  form. This form lets you name the person you want to make end-of-life treatment decisions for you (your \"health care agent\") if you're not able to. Many hospitals and nursing homes will give you the forms you need to complete a living will and a medical power of . · Your living will is used only if you can't make or communicate decisions for yourself anymore.  If you become able to make decisions again, you can accept or refuse any treatment, no matter what you wrote in your living will. · Your state may offer an online registry. This is a place where you can store your living will online so the doctors and nurses who need to treat you can find it right away. What should you think about when creating a living will? Talk about your end-of-life wishes with your family members and your doctor. Let them know what you want. That way the people making decisions for you won't be surprised by your choices. Think about these questions as you make your living will:  · Do you know enough about life support methods that might be used? If not, talk to your doctor so you know what might be done if you can't breathe on your own, your heart stops, or you're unable to swallow. · What things would you still want to be able to do after you receive life-support methods? Would you want to be able to walk? To speak? To eat on your own? To live without the help of machines? · If you have a choice, where do you want to be cared for? In your home? At a hospital or nursing home? · Do you want certain Muslim practices performed if you become very ill? · If you have a choice at the end of your life, where would you prefer to die? At home? In a hospital or nursing home? Somewhere else? · Would you prefer to be buried or cremated? · Do you want your organs to be donated after you die? What should you do with your living will? · Make sure that your family members and your health care agent have copies of your living will. · Give your doctor a copy of your living will to keep in your medical record. If you have more than one doctor, make sure that each one has a copy. · You may want to put a copy of your living will where it can be easily found. Where can you learn more? Go to http://lalit-rose.info/. Enter J795 in the search box to learn more about \"Learning About Living [place]. \"  Current as of: August 8, 2016  Content Version: 11.3  © 4562-5602 [employer]. Care instructions adapted under license by [employer] (which disclaims liability or warranty for this information). If you have questions about a medical condition or this instruction, always ask your healthcare professional. [address] any warranty or liability for your use of this information. Advance Directives: Care Instructions  Your Care Instructions  An advance directive is a legal way to state your wishes at the end of your life. It tells your family and your doctor what to do if you can no longer say what you want. There are two main types of advance directives. You can change them any time that your wishes change. · A living will tells your family and your doctor your wishes about life support and other treatment. · A durable power of  for health care lets you name a person to make treatment decisions for you when you can't speak for yourself. This person is called a health care agent. If you do not have an advance directive, decisions about your medical care may be made by a doctor or a  who doesn't know you. It may help to think of an advance directive as a gift to the people who care for you. If you have one, they won't have to make tough decisions by themselves. Follow-up care is a key part of your treatment and safety. Be sure to make and go to all appointments, and call your doctor if you are having problems. It's also a good idea to know your test results and keep a list of the medicines you take. How can you care for yourself at home? · Discuss your wishes with your loved ones and your doctor. This way, there are no surprises. · Many states have a unique form. Or you might use a universal form that has been approved by many states. This kind of form can sometimes be completed and stored online.  Your electronic copy will then be available wherever you have a connection to the Internet. In most cases, doctors will respect your wishes even if you have a form from a different state. · You don't need a  to do an advance directive. But you may want to get legal advice. · Think about these questions when you prepare an advance directive:  ¨ Who do you want to make decisions about your medical care if you are not able to? Many people choose a family member or close friend. ¨ Do you know enough about life support methods that might be used? If not, talk to your doctor so you understand. ¨ What are you most afraid of that might happen? You might be afraid of having pain, losing your independence, or being kept alive by machines. ¨ Where would you prefer to die? Choices include your home, a hospital, or a nursing home. ¨ Would you like to have information about hospice care to support you and your family? ¨ Do you want to donate organs when you die? ¨ Do you want certain Quaker practices performed before you die? If so, put your wishes in the advance directive. · Read your advance directive every year, and make changes as needed. When should you call for help? Be sure to contact your doctor if you have any questions. Where can you learn more? Go to http://lalit-rose.info/. Enter R264 in the search box to learn more about \"Advance Directives: Care Instructions. \"  Current as of: November 17, 2016  Content Version: 11.3  © 5835-4851 [employer]. Care instructions adapted under license by [employer] (which disclaims liability or warranty for this information). If you have questions about a medical condition or this instruction, always ask your healthcare professional. [address] any warranty or liability for your use of this information.

## 2017-10-04 NOTE — PROGRESS NOTES
Rajeev Zamora is a 32 y.o. female who presents with the following  Chief Complaint   Patient presents with    Follow-up       HPI  Patient comes in for a follow up for 2nd botox treatment. She has been doing well since this treatment. Has only been having about 1 headache a week lasting about 1-2 hours at max. She has actually not had any migraines since starting botox. She cambia will work well to abort quickly. . She does sometimes have to take 2 doses and this will completely abort. The headaches are no different then before. Depending on school stress, and outside triggers. She has also noticed since coming off indKIHEITAI LA she has been clenching her jaw at night and unsure if this medication helped with this. Overall has seen dramatic relief with headaches. Allergies   Allergen Reactions    Salicylic Acid-Sulfur Anaphylaxis    Sulfur Unknown (comments)       Current Outpatient Prescriptions   Medication Sig    Diclofenac Potassium (CAMBIA) 50 mg pwpk 1 packet by mouth at headache onset. May repeat again in 2 hours X 1 dose. Max 2 doses in 24 hours    cyanocobalamin, vitamin B-12, (VITAMIN B-12) 5,000 mcg subl 5,000 mcg by SubLINGual route daily.  onabotulinumtoxinA (BOTOX) 200 unit injection Inject 155 units to 31 fda approved sites to face and neck every 12 [id number]    QNASL 80 mcg/actuation HFAA     CETIRIZINE HCL (ZYRTEC PO) Take  by mouth.  MICROGESTIN FE 1.5/30, 28, 1.5 mg-30 mcg (21)/75 mg (7) tab      No current facility-administered medications for this visit.         History   Smoking Status    Never Smoker   Smokeless Tobacco    Never Used       Past Medical History:   Diagnosis Date    Calculus of kidney     Headache        Past Surgical History:   Procedure Laterality Date    HX LITHOTRIPSY         Family History   Problem Relation Age of Onset    Cancer Father      Prostate    Headache Father     Heart Disease Father     Diabetes Father        Social History     Social History    Marital status: SINGLE     Spouse name: N/A    Number of children: N/A    Years of education: N/A     Social History Main Topics    Smoking status: Never Smoker    Smokeless tobacco: Never Used    Alcohol use Yes    Drug use: No    Sexual activity: Not Currently     Other Topics Concern    None     Social History Narrative       Review of Systems   HENT: Negative for ear pain, hearing loss and tinnitus. Eyes: Positive for photophobia. Negative for blurred vision and double vision. Respiratory: Negative for shortness of breath and wheezing. Cardiovascular: Negative for chest pain and palpitations. Gastrointestinal: Negative for nausea and vomiting. Neurological: Positive for headaches. Negative for dizziness and weakness. Remainder of comprehensive review is negative. Physical Exam :    Visit Vitals    /82    Ht 5' 1\" (1.549 m)    Wt 73.5 kg (162 lb)    BMI 30.61 kg/m2       General: Well defined, nourished, and groomed individual in no acute distress.    Neck: Supple, nontender, no bruits, no pain with resistance to active range of motion.    Heart: Regular rate and rhythm, no murmurs, rub, or gallop. Normal S1S2. Lungs: Clear to auscultation bilaterally with equal chest expansion, no cough, no wheeze  Musculoskeletal: Extremities revealed no edema and had full range of motion of joints.    Psych: Good mood and bright affect    NEUROLOGICAL EXAMINATION:    Mental Status: Alert and oriented to person, place, and time    Cranial Nerves:    II, III, IV, VI: Visual acuity grossly intact. Visual fields are normal.    Pupils are equal, round, and reactive to light and accommodation.    Extra-ocular movements are full and fluid. Fundoscopic exam was benign, no ptosis or nystagmus.    V-XII: Hearing is grossly intact. Facial features are symmetric, with normal sensation and strength. The palate rises symmetrically and the tongue protrudes midline.  Sternocleidomastoids 5/5.     Motor Examination: Normal tone, bulk, and strength, 5/5 muscle strength throughout. Coordination: Finger to nose was normal. No resting or intention tremor    Gait and Station: Steady while walking. Normal arm swing. No pronator drift. No muscle wasting or fasiculations noted. Reflexes: DTRs 2+ throughout. Results for orders placed or performed in visit on 17   CULTURE, URINE   Result Value Ref Range    Urine Culture, Routine       Mixed urogenital rowdy  Less than 10,000 colonies/mL     VITAMIN B12   Result Value Ref Range    Vitamin B12 >2000 (H) 211 - 946 pg/mL   URINALYSIS W/MICROSCOPIC   Result Value Ref Range    Specific Gravity 1.020 1.005 - 1.030    pH (UA) 8.0 (H) 5.0 - 7.5    Color Yellow Yellow    Appearance Clear Clear    Leukocyte Esterase 2+ (A) Negative    Protein Negative Negative/Trace    Glucose Negative Negative    Ketone Negative Negative    Blood Trace (A) Negative    Bilirubin Negative Negative    Urobilinogen 1.0 0.2 - 1.0 mg/dL    Nitrites Negative Negative    Microscopic Examination See additional order    MICROSCOPIC EXAMINATION   Result Value Ref Range    WBC 0-5 0 - 5 /hpf    RBC 0-2 0 - 2 /hpf    Epithelial cells 0-10 0 - 10 /hpf    Casts None seen None seen /lpf    Mucus Present Not Estab. Bacteria Few None seen/Few   AMB POC URINALYSIS DIP STICK AUTO W/O MICRO   Result Value Ref Range    Color (UA POC) Yellow     Clarity (UA POC) Clear     Glucose (UA POC) Negative Negative    Bilirubin (UA POC) Negative Negative    Ketones (UA POC) Negative Negative    Specific gravity (UA POC) 1.020 1.001 - 1.035    Blood (UA POC) Trace Negative    pH (UA POC) 8.0 4.6 - 8.0    Protein (UA POC) Trace Negative mg/dL    Urobilinogen (UA POC) 1 mg/dL 0.2 - 1    Nitrites (UA POC) Negative Negative    Leukocyte esterase (UA POC) Trace Negative       Orders Placed This Encounter    Diclofenac Potassium (CAMBIA) 50 mg pwpk     Si packet by mouth at headache onset. May repeat again in 2 hours X 1 dose. Max 2 doses in 24 hours     Dispense:  12 Packet     Refill:  5     Order Specific Question:   Expiration Date     Answer:   9/30/2017     Order Specific Question:   Lot#     Answer:   [id number]     Order Specific Question:        Answer:   Depomed       1. Migraine without aura and without status migrainosus, not intractable        Follow-up Disposition:  Return after botox. Botox has been beneficial in decreasing frequency, intensity, duration. Keep on with next botox at the end of this month. We will continue to monitor symptoms and use cambia for abortive therapy as this is working well for her still. imitrex caused some side effects.  Call with any change.s       This note will not be viewable in [employer]

## 2017-10-10 ENCOUNTER — TELEPHONE (OUTPATIENT)
Dept: NEUROLOGY | Age: 27
End: 2017-10-10

## 2017-10-10 NOTE — TELEPHONE ENCOUNTER
----- Message from Devin Vasquez sent at 10/10/2017  4:10 PM EDT -----  Regarding: NP Olaughlin/rx  refill   Pt's (p) [phone], pt said [facility]  has been trying to reach the office they have made 7 attempts they are trying to get her Botox delivered please call [facility] at (p) [phone], they have also sent a fax this past Saturday.     They will not sent it without the MD approval.

## 2017-10-11 ENCOUNTER — OFFICE VISIT (OUTPATIENT)
Dept: INTERNAL MEDICINE CLINIC | Age: 27
End: 2017-10-11

## 2017-10-11 ENCOUNTER — TELEPHONE (OUTPATIENT)
Dept: NEUROLOGY | Age: 27
End: 2017-10-11

## 2017-10-11 VITALS
HEIGHT: 61 IN | HEART RATE: 91 BPM | BODY MASS INDEX: 30.58 KG/M2 | DIASTOLIC BLOOD PRESSURE: 86 MMHG | SYSTOLIC BLOOD PRESSURE: 133 MMHG | RESPIRATION RATE: 12 BRPM | WEIGHT: 162 LBS | TEMPERATURE: 98.3 F

## 2017-10-11 DIAGNOSIS — L71.9 ROSACEA: Primary | ICD-10-CM

## 2017-10-11 RX ORDER — DOXYCYCLINE HYCLATE 100 MG
50 TABLET ORAL DAILY
Qty: 15 TAB | Refills: 2 | Status: SHIPPED | OUTPATIENT
Start: 2017-10-11 | End: 2018-01-30

## 2017-10-11 RX ORDER — DOXYCYCLINE HYCLATE 50 MG/1
50 CAPSULE ORAL DAILY
Qty: 30 CAP | Refills: 2 | Status: SHIPPED | OUTPATIENT
Start: 2017-10-11 | End: 2018-01-30

## 2017-10-11 RX ORDER — AZELAIC ACID 0.15 G/G
GEL TOPICAL 2 TIMES DAILY
Qty: 50 G | Refills: 2 | Status: SHIPPED | OUTPATIENT
Start: 2017-10-11 | End: 2018-07-30

## 2017-10-11 NOTE — MR AVS SNAPSHOT
Visit Information Date & Time Provider Department Dept. Phone Encounter #  
 10/11/2017  3:15 PM Cl Weems MD [facility] [address] [id number] Upcoming Health Maintenance Date Due DTaP/Tdap/Td series (1 - Tdap) 2/12/2009 PAP AKA CERVICAL CYTOLOGY 2/12/2009 INFLUENZA AGE 9 TO ADULT 8/1/2017 Allergies as of 10/11/2017  Review Complete On: 10/11/2017 By: Brandie Jimenes Severity Noted Reaction Type Reactions Salicylic Acid-sulfur High 07/06/2017    Anaphylaxis Sulfur  04/15/2016    Unknown (comments) Current Immunizations  Never Reviewed No immunizations on file. Not reviewed this visit You Were Diagnosed With   
  
 Codes Comments Rosacea    -  Primary ICD-10-CM: L71.9 ICD-9-CM: 695.3 Vitals BP Pulse Temp Resp Height(growth percentile) Weight(growth percentile) 133/86 (BP 1 Location: Left arm, BP Patient Position: Sitting) 91 98.3 °F (36.8 °C) 12 5' 1\" (1.549 m) 162 lb (73.5 kg) BMI OB Status Smoking Status 30.61 kg/m2 Having regular periods Never Smoker Vitals History BMI and BSA Data Body Mass Index Body Surface Area  
 30.61 kg/m 2 1.78 m 2 Preferred Pharmacy Pharmacy Name Phone [facility] [address] [place], [place] [address]y [address]  [facility] [phone] Your Updated Medication List  
  
   
This list is accurate as of: 10/11/17  4:10 PM.  Always use your most recent med list.  
  
  
  
  
 azelaic acid 15 % topical gel Commonly known as:  Madeleine Market Apply  to affected area two (2) times a day. cyanocobalamin (vitamin B-12) 5,000 mcg Subl Commonly known as:  VITAMIN B-12  
5,000 mcg by SubLINGual route daily. Diclofenac Potassium 50 mg Pwpk Commonly known as:  CAMBIA  
1 packet by mouth at headache onset. May repeat again in 2 hours X 1 dose. Max 2 doses in 24 hours * doxycycline 100 mg tablet Commonly known as:  VIBRA-TABS Take 0.5 Tabs by mouth daily. * doxycycline 50 mg capsule Commonly known as:  VIBRAMYCIN Take 1 Cap by mouth daily. MICROGESTIN FE 1.5/30 (28) 1.5 mg-30 mcg (21)/75 mg (7) Tab Generic drug:  norethindrone-ethinyl estradiol-iron  
  
 onabotulinumtoxinA 200 unit injection Commonly known as:  BOTOX Inject 155 units to 31 fda approved sites to face and neck every 12 [id number]  
  
 QNASL 80 mcg/actuation Hfaa Generic drug:  beclomethasone dipropionate ZYRTEC PO Take  by mouth. * Notice: This list has 2 medication(s) that are the same as other medications prescribed for you. Read the directions carefully, and ask your doctor or other care provider to review them with you. Prescriptions Printed Refills  
 doxycycline (VIBRA-TABS) 100 mg tablet 2 Sig: Take 0.5 Tabs by mouth daily. Class: Print Route: Oral  
  
Prescriptions Sent to Pharmacy Refills  
 azelaic acid (FINACEA) 15 % topical gel 2 Sig: Apply  to affected area two (2) times a day. Class: Normal  
 Pharmacy: [facility] [address] [place] [address] [address] [facility] of [address] (\[facility]\"" Ph #: [phone] Route: Topical  
 doxycycline (VIBRAMYCIN) 50 mg capsule 2 Sig: Take 1 Cap by mouth daily. Class: Normal  
 [facility] [address] [address] AT [facility] of [address] (\[facility]\"" Ph #: [phone] Route: Oral  
  
Patient Instructions Rosacea: Care Instructions Your Care Instructions Rosacea (say \"nkq-JFV-biwd\") is a skin condition that can cause redness, pimples, and red lines on the nose, cheeks, chin, and forehead. It is often mistaken for acne because it can cause outbreaks with bumps like pimples. Rosacea can also cause burning and soreness in your eyes. Rosacea is usually controlled by using medicine and avoiding alcohol, the sun, and other things that can make rosacea worse. Your doctor may have prescribed medicines or other treatment. If antibiotics do not control the rosacea, your doctor may try other medicines. Follow-up care is a key part of your treatment and safety. Be sure to make and go to all appointments, and call your doctor if you are having problems. It's also a good idea to know your test results and keep a list of the medicines you take. How can you care for yourself at home? · Take your medicines exactly as prescribed. Call your doctor if you think you are having a problem with your medicine. · If your doctor prescribed antibiotics, take them as directed. Do not stop taking them just because you feel better. You need to take the full course of antibiotics. · Always wear sunscreen on exposed skin. Make sure to use a broad-spectrum sunscreen that has a sun protection factor (SPF) of 30 or higher. Use it every day, even when it is cloudy. · Use soaps, lotions, and makeup made for sensitive skin that do not contain alcohol, are not abrasive, and will not clog pores. · Avoid rubbing or scrubbing your face. · Green-based makeup may help mask the redness of an outbreak. Your doctor may be able to refer you to someone who can help you use makeup to cover redness and bumps. · If you have rosacea on your eyelids, put a warm, wet towel, or compress, on your eyes several times a day. Gently wash your eyelids with a washcloth or an eyelid cleanser that is sold in drugstores. Use artificial tears if your eyes feel dry. · Make a list or keep a diary of things that may trigger your rosacea. Use the diary every day for several weeks. Avoid whatever you find that makes your rosacea worse. These triggers may include: 
¨ Harsh weather.  Wear a hat and scarf to shield your face from the cold and wind. Use a moisturizer during the winter to keep your face moist. 
¨ Stress. Eat a healthy diet and get plenty of exercise and sleep. ¨ Alcohol, spicy foods, or hot drinks. Avoid or limit these if they make your rosacea worse. ¨ Getting too hot when you exercise. Try working out for a shorter time. In the summer, exercise during the cool morning hours. ¨ Hot showers. Take warm or cool showers and avoid hot tubs and saunas. When should you call for help? Watch closely for changes in your health, and be sure to contact your doctor if: 
· You do not get better as expected. Where can you learn more? Go to http://lalit-rose.info/. Enter C382 in the search box to learn more about \"Rosacea: Care Instructions. \" Current as of: October 13, 2016 Content Version: 11.3 © 6228-1286 [employer]. Care instructions adapted under license by [employer] (which disclaims liability or warranty for this information). If you have questions about a medical condition or this instruction, always ask your healthcare professional. [place] [address] any warranty or liability for your use of this information. Introducing [facility]! Dear Micha Cummins: Thank you for requesting a Tru[employer] account. Our records indicate that you already have an active [employer] account. You can access your account anytime at [email or web address] Did you know that you can access your hospital and ER discharge instructions at any time in TruantToday? You can also review all of your test results from your hospital stay or ER visit. Additional Information If you have questions, please visit the Frequently Asked Questions section of the TruantToday website at [email or web address] Remember, TruantToday is NOT to be used for urgent needs. For medical emergencies, dial 911. Now available from your iPhone and Android! Please provide this summary of care documentation to your next provider. Your primary care clinician is listed as Riya Bangura. If you have any questions after today's visit, please call [phone].

## 2017-10-11 NOTE — PROGRESS NOTES
Presents with rash on her cheeks for 7 days. Been stressed. She was congested in her nose. Completed Prednisone on 10/1/17. Due for Botox.

## 2017-10-11 NOTE — TELEPHONE ENCOUNTER
Attempted to contact patient. Left message on vm for return call. Patient will need new PA for botox.

## 2017-10-11 NOTE — PATIENT INSTRUCTIONS
Rosacea: Care Instructions  Your Care Instructions  Rosacea (say \"[place]\") is a skin condition that can cause redness, pimples, and red lines on the nose, cheeks, chin, and forehead. It is often mistaken for acne because it can cause outbreaks with bumps like pimples. Rosacea can also cause burning and soreness in your eyes. Rosacea is usually controlled by using medicine and avoiding alcohol, the sun, and other things that can make rosacea worse. Your doctor may have prescribed medicines or other treatment. If antibiotics do not control the rosacea, your doctor may try other medicines. Follow-up care is a key part of your treatment and safety. Be sure to make and go to all appointments, and call your doctor if you are having problems. It's also a good idea to know your test results and keep a list of the medicines you take. How can you care for yourself at home? · Take your medicines exactly as prescribed. Call your doctor if you think you are having a problem with your medicine. · If your doctor prescribed antibiotics, take them as directed. Do not stop taking them just because you feel better. You need to take the full course of antibiotics. · Always wear sunscreen on exposed skin. Make sure to use a broad-spectrum sunscreen that has a sun protection factor (SPF) of 30 or higher. Use it every day, even when it is cloudy. · Use soaps, lotions, and makeup made for sensitive skin that do not contain alcohol, are not abrasive, and will not clog pores. · Avoid rubbing or scrubbing your face. · Green-based makeup may help mask the redness of an outbreak. Your doctor may be able to refer you to someone who can help you use makeup to cover redness and bumps. · If you have rosacea on your eyelids, put a warm, wet towel, or compress, on your eyes several times a day. Gently wash your eyelids with a washcloth or an eyelid cleanser that is sold in drugsOneTouchEMRes.  Use artificial tears if your eyes feel dry.  · Make a list or keep a diary of things that may trigger your rosacea. Use the diary every day for several weeks. Avoid whatever you find that makes your rosacea worse. These triggers may include:  ¨ Harsh weather. Wear a hat and scarf to shield your face from the cold and wind. Use a moisturizer during the winter to keep your face moist.  ¨ Stress. Eat a healthy diet and get plenty of exercise and sleep. ¨ Alcohol, spicy foods, or hot drinks. Avoid or limit these if they make your rosacea worse. ¨ Getting too hot when you exercise. Try working out for a shorter time. In the summer, exercise during the cool morning hours. ¨ Hot showers. Take warm or cool showers and avoid hot tubs and saunas. When should you call for help? Watch closely for changes in your health, and be sure to contact your doctor if:  · You do not get better as expected. Where can you learn more? Go to http://lalit-rose.info/. Enter A005 in the search box to learn more about \"Rosacea: Care Instructions. \"  Current as of: October 13, 2016  Content Version: 11.3  © 8092-5667 [employer]. Care instructions adapted under license by [employer] (which disclaims liability or warranty for this information). If you have questions about a medical condition or this instruction, always ask your healthcare professional. [address] any warranty or liability for your use of this information.

## 2017-10-12 NOTE — PROGRESS NOTES
HISTORY OF PRESENT ILLNESS  Bunny Fajardo is a 32 y.o. female. HPI  Presents with rash on cheeks, forehead. Onset was 7 day(s) ago. Severity is moderate  Denies sig etoh use, spicy foods,  Took prednisone due to nasal congestio    Review of Systems   Constitutional: Negative for diaphoresis, malaise/fatigue and weight loss. Eyes: Negative for blurred vision. Respiratory: Negative for shortness of breath. Cardiovascular: Negative for chest pain. Gastrointestinal: Negative for abdominal pain. Genitourinary: Negative for flank pain and frequency. Musculoskeletal: Negative for myalgias. Skin: Positive for rash. Neurological: Negative for dizziness, weakness and headaches. Psychiatric/Behavioral: The patient is not nervous/anxious. All other systems reviewed and are negative. Physical Exam   Constitutional: She is oriented to person, place, and time. She appears well-developed and well-nourished. No distress. HENT:   Head: Moderate papular rash of cheeks, chin, mild of forehead   Cardiovascular: Normal rate. Pulmonary/Chest: Effort normal.   Neurological: She is alert and oriented to person, place, and time. Psychiatric: She has a normal mood and affect. Nursing note and vitals reviewed. ASSESSMENT and PLAN  Diagnoses and all orders for this visit:    1. Rosacea  Due to stress. Will start rx below. May take 12 weeks to assess efficacy  -     azelaic acid (FINACEA) 15 % topical gel; Apply  to affected area two (2) times a day. -     doxycycline (VIBRAMYCIN) 50 mg capsule;  Take 1 Cap by mouth daily for 1 month - cont if flaring  It is reasonable to also schedule derm appt

## 2017-11-02 ENCOUNTER — TELEPHONE (OUTPATIENT)
Dept: NEUROLOGY | Age: 27
End: 2017-11-02

## 2017-11-02 NOTE — TELEPHONE ENCOUNTER
Received T.C from [facility] spoke with [facility] regarding Botox injection [facility]  Auth# [id number]  Effective 8/21/17-2/20/18  tx 2

## 2017-11-02 NOTE — TELEPHONE ENCOUNTER
Pt informed that botox is approved. Patient needs botox appt tues-thurs after 3pm when called to schedule.

## 2017-11-09 ENCOUNTER — TELEPHONE (OUTPATIENT)
Dept: NEUROLOGY | Age: 27
End: 2017-11-09

## 2017-11-09 NOTE — TELEPHONE ENCOUNTER
Patient call regarding her botox injections if it was shipped and if we received or not. She wants to schedule appt.   but need to know if injection is here or not  Please call back patient at [phone]

## 2017-11-09 NOTE — TELEPHONE ENCOUNTER
Called and spoke to [facility]. botox has been scheduled to ship hdz location and should arrive tomorrow. Called patient to inform her that once botox is received I will call her to set up appt. She states understanding.

## 2017-11-10 ENCOUNTER — TELEPHONE (OUTPATIENT)
Dept: NEUROLOGY | Age: 27
End: 2017-11-10

## 2017-11-10 NOTE — TELEPHONE ENCOUNTER
Patient is scheduled for Wednesday, November 22, 2017 03:00 PM   With Dr. Leo Lovelace for botox inj

## 2017-11-22 ENCOUNTER — OFFICE VISIT (OUTPATIENT)
Dept: NEUROLOGY | Age: 27
End: 2017-11-22

## 2017-11-22 VITALS
DIASTOLIC BLOOD PRESSURE: 80 MMHG | WEIGHT: 162 LBS | BODY MASS INDEX: 30.58 KG/M2 | HEART RATE: 103 BPM | SYSTOLIC BLOOD PRESSURE: 124 MMHG | HEIGHT: 61 IN | OXYGEN SATURATION: 98 %

## 2017-11-22 DIAGNOSIS — G43.009 MIGRAINE WITHOUT AURA AND WITHOUT STATUS MIGRAINOSUS, NOT INTRACTABLE: Primary | ICD-10-CM

## 2017-11-22 NOTE — PROCEDURES
[facility] [place]  OFFICE PROCEDURE PROGRESS NOTE        Chart reviewed for the following:   Dominik Perdomo MD, have reviewed the History, Physical and updated the Allergic reactions for Harshad Arias performed immediately prior to start of procedure:   Dominik Perdomo MD, have performed the following reviews on Bunny Fajardo prior to the start of the procedure:            * Patient was identified by name and date of birth   * Agreement on procedure being performed was verified  * Risks and Benefits explained to the patient  * Procedure site verified and marked as necessary  * Patient was positioned for comfort  * Consent was signed and verified     Time: 1550  Date of procedure: 11/22/2017  Procedure performed by:  Orlando Gant MD  Provider assisted by: None  Patient assisted by: self  How tolerated by patient: tolerated the procedure well with no complications  Comments: None    Botox Injection Note    Indication:   Patient has chronic migraine and presents for repeat Botox Injection as it has reduced overall headahce frequency including migraines (see clinic notes for details). Procedure:    Botox concentration: 200 units in 4 ml of preservative-free normal saline.   31 sites injections, distribution as follow         Units/site Sites Sides subtotal  Procerus     5  1 1 5        5   1 2 10  Frontalis     5  2 2 20  Temporalis    5  4 2 40  Occipitalis    5  3 2 30   Upper cervical paraspinalis  5  2 2 20   Trapezius    5  3 2 30    200 units Botox were reconstituted, 155 units injected as above and the remainder was unavoidably wasted    Patient tolerated procedure well.      _____________________________  Orlando Gant M.D.

## 2017-11-22 NOTE — MR AVS SNAPSHOT
Visit Information Date & Time Provider Department Dept. Phone Encounter #  
 11/22/2017  3:00 PM [facility] [phone] [id number] Your Appointments 1/3/2018  3:00 PM  
Any with Ny Valle NP 1991 [facility] ([address]) Appt Note: 6 wk follow up after botox [address] [place] [address] [facility] 99 [address] [phone]  
  
   
 [address] [place] [address] [address] Upcoming Health Maintenance Date Due DTaP/Tdap/Td series (1 - Tdap) 2/12/2009 PAP AKA CERVICAL CYTOLOGY 2/12/2009 Influenza Age 5 to Adult 8/1/2017 Allergies as of 11/22/2017  Review Complete On: 11/22/2017 By: Karis Torre LPN Severity Noted Reaction Type Reactions Salicylic Acid-sulfur High 07/06/2017    Anaphylaxis Sulfur  04/15/2016    Unknown (comments) Current Immunizations  Never Reviewed No immunizations on file. Not reviewed this visit You Were Diagnosed With   
  
 Codes Comments Migraine without aura and without status migrainosus, not intractable    -  Primary ICD-10-CM: G43.009 ICD-9-CM: 346.10 Vitals BP Pulse Height(growth percentile) Weight(growth percentile) SpO2 BMI  
 124/80 (BP 1 Location: Left arm, BP Patient Position: Sitting) (!) 103 5' 1\" (1.549 m) 162 lb (73.5 kg) 98% 30.61 kg/m2 OB Status Smoking Status Having regular periods Never Smoker BMI and BSA Data Body Mass Index Body Surface Area  
 30.61 kg/m 2 1.78 m 2 Preferred Pharmacy Pharmacy Name Phone [facility] [address]  [facility] [phone] Your Updated Medication List  
  
   
This list is accurate as of: 11/22/17  4:01 PM.  Always use your most recent med list.  
  
  
  
  
 azelaic acid 15 % topical gel Commonly known as:  [employer] Apply  to affected area two (2) times a day. cyanocobalamin (vitamin B-12) 5,000 mcg Subl Commonly known as:  VITAMIN B-12  
5,000 mcg by SubLINGual route daily. Diclofenac Potassium 50 mg Pwpk Commonly known as:  CAMBIA  
1 packet by mouth at headache onset. May repeat again in 2 hours X 1 dose. Max 2 doses in 24 hours * doxycycline 100 mg tablet Commonly known as:  VIBRA-TABS Take 0.5 Tabs by mouth daily. * doxycycline 50 mg capsule Commonly known as:  VIBRAMYCIN Take 1 Cap by mouth daily. MICROGESTIN FE 1.5/30 (28) 1.5 mg-30 mcg (21)/75 mg (7) Tab Generic drug:  norethindrone-ethinyl estradiol-iron * onabotulinumtoxinA 200 unit injection Commonly known as:  BOTOX Inject 155 units to 31 fda approved sites to face and neck every 12 [id number]  
  
 * onabotulinumtoxinA 200 unit injection Commonly known as:  BOTOX Inject 200 units IM to head and neck in 31 FDA approved sites every 3 months. QNASL 80 mcg/actuation Hfaa Generic drug:  beclomethasone dipropionate ZYRTEC PO Take  by mouth. * Notice: This list has 4 medication(s) that are the same as other medications prescribed for you. Read the directions carefully, and ask your doctor or other care provider to review them with you. We Performed the Following [address] [id number] CPT(R)] Introducing [facility] & HEALTH SERVICES! Dear Odilia Pennington: Thank you for requesting a [employer] account. Our records indicate that you already have an active [employer] account. You can access your account anytime at [email or web address] [email or web address] ([email or web address]). [employer] (G&R) Did you know that you can access your hospital and ER discharge instructions at any time in [employer]? You can also review all of your test results from your hospital stay or ER visit. Additional Information If you have questions, please visit the Frequently Asked Questions section of the [employer] website at [email or web address] Remember, [employer] is NOT to be used for urgent needs. For medical emergencies, dial 911. Now available from your iPhone and Android! Please provide this summary of care documentation to your next provider. Your primary care clinician is listed as Dorys Walton. If you have any questions after today's visit, please call [phone].

## 2018-01-30 ENCOUNTER — OFFICE VISIT (OUTPATIENT)
Dept: NEUROLOGY | Age: 28
End: 2018-01-30

## 2018-01-30 VITALS
SYSTOLIC BLOOD PRESSURE: 122 MMHG | WEIGHT: 155 LBS | HEIGHT: 61 IN | BODY MASS INDEX: 29.27 KG/M2 | DIASTOLIC BLOOD PRESSURE: 88 MMHG

## 2018-01-30 DIAGNOSIS — G43.009 MIGRAINE WITHOUT AURA AND WITHOUT STATUS MIGRAINOSUS, NOT INTRACTABLE: Primary | ICD-10-CM

## 2018-01-30 RX ORDER — DIHYDROERGOTAMINE MESYLATE 4 MG/ML
SPRAY NASAL
Qty: 1 BOTTLE | Refills: 5 | Status: SHIPPED | OUTPATIENT
Start: 2018-01-30 | End: 2018-04-18

## 2018-01-30 NOTE — PROGRESS NOTES
Patient is here for follow up after botox. After that injection she felt sore for up to a day after. 5-6 headaches per month lasting approx 1 hour.

## 2018-01-30 NOTE — MR AVS SNAPSHOT
[place] 
 
 
 [place] [address] [facility] [address] [facility] [id number] [phone] Patient: Ryan Panda MRN: [id number] QRA:[id number][birth year]60/[id number] Visit Information Date & Time Provider Department Dept. Phone Encounter #  
 1/30/2018  3:30 PM Hermelindo Roa NP [facility] Neurology [facility] [phone] [id number] Follow-up Instructions Return after botox. Upcoming Health Maintenance Date Due DTaP/Tdap/Td series (1 - Tdap) 2/12/2009 PAP AKA CERVICAL CYTOLOGY 2/12/2009 Influenza Age 5 to Adult 8/1/2017 Allergies as of 1/30/2018  Review Complete On: 1/30/2018 By: Rome Weinstein Severity Noted Reaction Type Reactions Salicylic Acid-sulfur High 07/06/2017    Anaphylaxis Sulfur  04/15/2016    Unknown (comments) Current Immunizations  Never Reviewed No immunizations on file. Not reviewed this visit You Were Diagnosed With   
  
 Codes Comments Migraine without aura and without status migrainosus, not intractable    -  Primary ICD-10-CM: G43.009 ICD-9-CM: 346.10 Vitals BP Height(growth percentile) Weight(growth percentile) BMI OB Status Smoking Status 122/88 5' 1\" (1.549 m) 155 lb (70.3 kg) 29.29 kg/m2 Having regular periods Never Smoker Vitals History BMI and BSA Data Body Mass Index Body Surface Area  
 29.29 kg/m 2 1.74 m 2 Preferred Pharmacy Pharmacy Name Phone [facility] 1 [address] AT  [facility] [phone] Your Updated Medication List  
  
   
This list is accurate as of: 1/30/18  4:05 PM.  Always use your most recent med list.  
  
  
  
  
 azelaic acid 15 % topical gel Commonly known as:  [employer] Apply  to affected area two (2) times a day. COLACE PO Take  by mouth.  
  
 cyanocobalamin (vitamin B-12) 5,000 mcg Subl Commonly known as:  VITAMIN B-12  
 5,000 mcg by SubLINGual route daily. Diclofenac Potassium 50 mg Pwpk Commonly known as:  CAMBIA  
1 packet by mouth at headache onset. May repeat again in 2 hours X 1 dose. Max 2 doses in 24 hours  
  
 dihydroergotamine 0.5 mg/pump act. (4 mg/mL) nasal spray Commonly known as:  MIGRANAL  
1 spray in each nostril at HA onset. May repeat again in 15 minutes X 1 dose MICROGESTIN FE 1.5/30 (28) 1.5 mg-30 mcg (21)/75 mg (7) Tab Generic drug:  norethindrone-ethinyl estradiol-iron * onabotulinumtoxinA 200 unit injection Commonly known as:  BOTOX Inject 155 units to 31 fda approved sites to face and neck every 12 [id number]  
  
 * onabotulinumtoxinA 200 unit injection Commonly known as:  BOTOX Inject 200 units IM to head and neck in 31 FDA approved sites every 3 months. QNASL 80 mcg/actuation Hfaa Generic drug:  beclomethasone dipropionate ZYRTEC PO Take  by mouth. * Notice: This list has 2 medication(s) that are the same as other medications prescribed for you. Read the directions carefully, and ask your doctor or other care provider to review them with you. Prescriptions Sent to Pharmacy Refills  
 dihydroergotamine (MIGRANAL) 0.5 mg/pump act. (4 mg/mL) nasal spray 5 Si spray in each nostril at HA onset. May repeat again in 15 minutes X 1 dose Class: Normal  
 Pharmacy: [email or web address] [address] [place] [address] [address] AT [facility] [address] ([facility] Ph #: [phone] Follow-up Instructions Return after botox. Patient Instructions PRESCRIPTION REFILL POLICY [facility] Statement to Patients 2014 In an effort to ensure the large volume of patient prescription refills is processed in the most efficient and expeditious manner, we are asking our patients to assist us by calling your Pharmacy for all prescription refills, this will include also your  Mail Order Pharmacy. The pharmacy will contact our office electronically to continue the refill process. Please do not wait until the last minute to call your pharmacy. We need at least 48 hours (2days) to fill prescriptions. We also encourage you to call your pharmacy before going to  your prescription to make sure it is ready. With regard to controlled substance prescription refill requests (narcotic refills) that need to be picked up at our office, we ask your cooperation by providing us with at least 72 hours (3days) notice that you will need a refill. We will not refill narcotic prescription refill requests after 4:00pm on any weekday, Monday through Thursday, or after 2:00pm on Fridays, or on the weekends. We encourage everyone to explore another way of getting your prescription refill request processed using [employer], our patient web portal through our electronic medical record system. [employer] is an efficient and effective way to communicate your medication request directly to the office and  downloadable as an nitza on your smart phone . [employer] also features a review functionality that allows you to view your medication list as well as leave messages for your physician. Are you ready to get connected? If so please review the attatched instructions or speak to any of our staff to get you set up right away! Thank you so much for your cooperation. Should you have any questions please contact our Practice Administrator. The Physicians and Staff,  [facility] & HEALTH SERVICES! Dear Deirdre Villarreal: Thank you for requesting a [employer] account. Our records indicate that you already have an active [employer] account. You can access your account anytime at [email or web address] Did you know that you can access your hospital and ER discharge instructions at any time in [employer]? You can also review all of your test results from your hospital stay or ER visit. Additional Information If you have questions, please visit the Frequently Asked Questions section of the [employer] website at [email or web address] Remember, [employer] is NOT to be used for urgent needs. For medical emergencies, dial 911. Now available from your iPhone and Android! Please provide this summary of care documentation to your next provider. Your primary care clinician is listed as Kalin Martinez. If you have any questions after today's visit, please call [phone].

## 2018-01-30 NOTE — PATIENT INSTRUCTIONS
[facility]   Statement to Patients  April 1, 2014      In an effort to ensure the large volume of patient prescription refills is processed in the most efficient and expeditious manner, we are asking our patients to assist us by calling your Pharmacy for all prescription refills, this will include also your  Mail Order Pharmacy. The pharmacy will contact our office electronically to continue the refill process. Please do not wait until the last minute to call your pharmacy. We need at least 48 hours (2days) to fill prescriptions. We also encourage you to call your pharmacy before going to  your prescription to make sure it is ready. With regard to controlled substance prescription refill requests (narcotic refills) that need to be picked up at our office, we ask your cooperation by providing us with at least 72 hours (3days) notice that you will need a refill. We will not refill narcotic prescription refill requests after 4:00pm on any weekday, Monday through Thursday, or after 2:00pm on Fridays, or on the weekends. We encourage everyone to explore another way of getting your prescription refill request processed using Joome, our patient web portal through our electronic medical record system. Joome is an efficient and effective way to communicate your medication request directly to the office and  downloadable as an nitza on your smart phone . Jo[employer] also features a review functionality that allows you to view your medication list as well as leave messages for your physician. Are you ready to get connected? If so please review the attatched instructions or speak to any of our staff to get you set up right away! Thank you so much for your cooperation. Should you have any questions please contact our Practice Administrator.     The Physicians and Staff,  [facility]

## 2018-01-31 ENCOUNTER — TELEPHONE (OUTPATIENT)
Dept: NEUROLOGY | Age: 28
End: 2018-01-31

## 2018-01-31 NOTE — PROGRESS NOTES
Marko Maxwell is a 32 y.o. female who presents with the following  Chief Complaint   Patient presents with    Follow-up     follow up after botox       HPI Patient comes in for a follow up for botox. Still seeing good changes with a decrease from daily headaches to only about 6 a month lasting 1-2 hours max with cambia. She is sometimes needing 2 doses of Cambia but is wondering if she is getting used to the medication at all. She has actually not had any migraines since starting botox though. The headaches are no different then before. Depending on school stress, and outside triggers they can be up or down. Tried and failed multiple other prevention medications along with has side effects to triptans. Never tried Migranal.     Allergies   Allergen Reactions    Salicylic Acid-Sulfur Anaphylaxis    Sulfur Unknown (comments)       Current Outpatient Prescriptions   Medication Sig    DOCUSATE SODIUM (COLACE PO) Take  by mouth.  dihydroergotamine (MIGRANAL) 0.5 mg/pump act. (4 mg/mL) nasal spray 1 spray in each nostril at HA onset. May repeat again in 15 minutes X 1 dose    onabotulinumtoxinA (BOTOX) 200 unit injection Inject 200 units IM to head and neck in 31 FDA approved sites every 3 months.  onabotulinumtoxinA (BOTOX) 200 unit injection Inject 155 units to 31 fda approved sites to face and neck every 12 [id number]    azelaic acid (FINACEA) 15 % topical gel Apply  to affected area two (2) times a day.  Diclofenac Potassium (CAMBIA) 50 mg pwpk 1 packet by mouth at headache onset. May repeat again in 2 hours X 1 dose. Max 2 doses in 24 hours    cyanocobalamin, vitamin B-12, (VITAMIN B-12) 5,000 mcg subl 5,000 mcg by SubLINGual route daily.  MICROGESTIN FE 1.5/30, 28, 1.5 mg-30 mcg (21)/75 mg (7) tab     QNASL 80 mcg/actuation HFAA     CETIRIZINE HCL (ZYRTEC PO) Take  by mouth. No current facility-administered medications for this visit.         History   Smoking Status    Never Smoker Smokeless Tobacco    Never Used       Past Medical History:   Diagnosis Date    Acne     Allergic rhinitis 08/24/2016    shots Dr. Joel Gonzalez    B12 deficiency 2/9/2017    Calculus of kidney     Headache     Migraine without aura and without status migrainosus, not intractable 01/19/2017    botox injections. Dr. Jared Williamsonfilippo    Nephrolithiasis 8/24/2016       Past Surgical History:   Procedure Laterality Date    HX LITHOTRIPSY         Family History   Problem Relation Age of Onset    Cancer Father      Prostate    Headache Father     Heart Disease Father     Diabetes Father        Social History     Social History    Marital status: SINGLE     Spouse name: N/A    Number of children: N/A    Years of education: N/A     Social History Main Topics    Smoking status: Never Smoker    Smokeless tobacco: Never Used    Alcohol use Yes    Drug use: No    Sexual activity: Not Currently     Other Topics Concern    None     Social History Narrative       Review of Systems   HENT: Negative for ear pain, hearing loss and tinnitus. Eyes: Positive for blurred vision and photophobia. Respiratory: Negative for shortness of breath and wheezing. Gastrointestinal: Negative for nausea and vomiting. Neurological: Positive for headaches. Negative for weakness. Remainder of comprehensive review is negative. Physical Exam :    Visit Vitals    /88    Ht 5' 1\" (1.549 m)    Wt 70.3 kg (155 lb)    BMI 29.29 kg/m2       General: Well defined, nourished, and groomed individual in no acute distress.    Neck: Supple, nontender, no bruits, no pain with resistance to active range of motion.    Heart: Regular rate and rhythm, no murmurs, rub, or gallop. Normal S1S2.   Lungs: Clear to auscultation bilaterally with equal chest expansion, no cough, no wheeze  Musculoskeletal: Extremities revealed no edema and had full range of motion of joints.    Psych: Good mood and bright affect    NEUROLOGICAL EXAMINATION:    Mental Status: Alert and oriented to person, place, and time    Cranial Nerves:    II, III, IV, VI: Visual acuity grossly intact. Visual fields are normal.    Pupils are equal, round, and reactive to light and accommodation.    Extra-ocular movements are full and fluid. Fundoscopic exam was benign, no ptosis or nystagmus.    V-XII: Hearing is grossly intact. Facial features are symmetric, with normal sensation and strength. The palate rises symmetrically and the tongue protrudes midline. Sternocleidomastoids 5/5. Motor Examination: Normal tone, bulk, and strength, 5/5 muscle strength throughout. Coordination: Finger to nose was normal. No resting or intention tremor    Gait and Station: Steady while walking. Normal arm swing. No pronator drift. No muscle wasting or fasiculations noted. Reflexes: DTRs 2+ throughout. Results for orders placed or performed in visit on 08/01/17   CULTURE, URINE   Result Value Ref Range    Urine Culture, Routine       Mixed urogenital rowdy  Less than 10,000 colonies/mL     VITAMIN B12   Result Value Ref Range    Vitamin B12 >2000 (H) 211 - 946 pg/mL   URINALYSIS W/MICROSCOPIC   Result Value Ref Range    Specific Gravity 1.020 1.005 - 1.030    pH (UA) 8.0 (H) 5.0 - 7.5    Color Yellow Yellow    Appearance Clear Clear    Leukocyte Esterase 2+ (A) Negative    Protein Negative Negative/Trace    Glucose Negative Negative    Ketone Negative Negative    Blood Trace (A) Negative    Bilirubin Negative Negative    Urobilinogen 1.0 0.2 - 1.0 mg/dL    Nitrites Negative Negative    Microscopic Examination See additional order    MICROSCOPIC EXAMINATION   Result Value Ref Range    WBC 0-5 0 - 5 /hpf    RBC 0-2 0 - 2 /hpf    Epithelial cells 0-10 0 - 10 /hpf    Casts None seen None seen /lpf    Mucus Present Not Estab.     Bacteria Few None seen/Few   AMB POC URINALYSIS DIP STICK AUTO W/O MICRO   Result Value Ref Range    Color (UA POC) Yellow     Clarity (UA POC) Clear Glucose (UA POC) Negative Negative    Bilirubin (UA POC) Negative Negative    Ketones (UA POC) Negative Negative    Specific gravity (UA POC) 1.020 1.001 - 1.035    Blood (UA POC) Trace Negative    pH (UA POC) 8.0 4.6 - 8.0    Protein (UA POC) Trace Negative mg/dL    Urobilinogen (UA POC) 1 mg/dL 0.2 - 1    Nitrites (UA POC) Negative Negative    Leukocyte esterase (UA POC) Trace Negative       Orders Placed This Encounter    DOCUSATE SODIUM (COLACE PO)     Sig: Take  by mouth.  dihydroergotamine (MIGRANAL) 0.5 mg/pump act. (4 mg/mL) nasal spray     Si spray in each nostril at HA onset. May repeat again in 15 minutes X 1 dose     Dispense:  1 Bottle     Refill:  5       1. Migraine without aura and without status migrainosus, not intractable        Follow-up Disposition:  Return after botox. Headaches are still continuing to see benefit from botox treatment. From daily headaches to only about 6 a month since starting botox. Good results. Keep this as prevention. 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 Can consider midrin also. Call with any changes.        This note will not be viewable in [place] [employer]

## 2018-02-05 ENCOUNTER — TELEPHONE (OUTPATIENT)
Dept: NEUROLOGY | Age: 28
End: 2018-02-05

## 2018-02-26 ENCOUNTER — TELEPHONE (OUTPATIENT)
Dept: NEUROLOGY | Age: 28
End: 2018-02-26

## 2018-02-26 NOTE — TELEPHONE ENCOUNTER
Called carla. Spoke with rhonda.  Scheduled botox delivery for thursday march 1st. Patient will need [id number] updated

## 2018-03-01 ENCOUNTER — TELEPHONE (OUTPATIENT)
Dept: NEUROLOGY | Age: 28
End: 2018-03-01

## 2018-03-06 ENCOUNTER — TELEPHONE (OUTPATIENT)
Dept: NEUROLOGY | Age: 28
End: 2018-03-06

## 2018-03-06 NOTE — TELEPHONE ENCOUNTER
----- Message from Melinda Hernandez sent at 3/5/2018  4:32 PM EST -----  Regarding: JOE Cantrell/Telephone  Pt request for a call back from the practice in regards to her Botox injections. She would like to schedule her appt. Best contact number is [phone].

## 2018-03-09 ENCOUNTER — OFFICE VISIT (OUTPATIENT)
Dept: NEUROLOGY | Age: 28
End: 2018-03-09

## 2018-03-09 VITALS
HEART RATE: 96 BPM | HEIGHT: 61 IN | WEIGHT: 155 LBS | SYSTOLIC BLOOD PRESSURE: 106 MMHG | OXYGEN SATURATION: 98 % | DIASTOLIC BLOOD PRESSURE: 74 MMHG | BODY MASS INDEX: 29.27 KG/M2

## 2018-03-09 DIAGNOSIS — G43.009 MIGRAINE WITHOUT AURA AND WITHOUT STATUS MIGRAINOSUS, NOT INTRACTABLE: Primary | ICD-10-CM

## 2018-03-09 NOTE — PROCEDURES
[facility] NEUROLOGY CLINIC [place]  OFFICE PROCEDURE PROGRESS NOTE        Chart reviewed for the following:   Avril Enciso MD, have reviewed the History, Physical and updated the Allergic reactions for [address] performed immediately prior to start of procedure:   Avril Enciso MD, have performed the following reviews on Sergei Mera prior to the start of the procedure:            * Patient was identified by name and date of birth   * Agreement on procedure being performed was verified  * Risks and Benefits explained to the patient  * Procedure site verified and marked as necessary  * Patient was positioned for comfort  * Consent was signed and verified     Time: 1140  Date of procedure: 3/9/2018  Procedure performed by:  Javi Roldan MD  Provider assisted by: None  Patient assisted by: self  How tolerated by patient: tolerated the procedure well with no complications  Comments: None    Botox Injection Note    Indication:   Patient has chronic migraine and presents for repeat Botox Injection as it has reduced overall headahce frequency including migraines (see clinic notes for details). Procedure:    Botox concentration: 200 units in 4 ml of preservative-free normal saline.   31 sites injections, distribution as follow         Units/site Sites Sides subtotal  Procerus     5  1 1 5        5   1 2 10  Frontalis     5  2 2 20  Temporalis    5  4 2 40  Occipitalis    5  3 2 30   Upper cervical paraspinalis  5  2 2 20   Trapezius    5  3 2 30    200 units Botox were reconstituted, 155 units injected as above and the remainder was unavoidably wasted    Patient tolerated procedure well.      _____________________________  Javi Roldan M.D.

## 2018-03-09 NOTE — MR AVS SNAPSHOT
Nereyda SANDERS.FREDDY [address] [facility] [address] [phone] Patient: Dejah Horne MRN: [id number] Visit Information Date & Time Provider Department Dept. Phone Encounter #  
 3/9/2018 11:20 AM Jennifer Martinez MD [facility] Neurology [facility] [phone] [id number] Upcoming Health Maintenance Date Due DTaP/Tdap/Td series (1 - Tdap) 2/12/2009 PAP AKA CERVICAL CYTOLOGY 2/12/2009 Influenza Age 5 to Adult 8/1/2017 Allergies as of 3/9/2018  Review Complete On: 3/9/2018 By: Reji Bhardwaj LPN Severity Noted Reaction Type Reactions Salicylic Acid-sulfur High 07/06/2017    Anaphylaxis Sulfur  04/15/2016    Unknown (comments) Current Immunizations  Never Reviewed No immunizations on file. Not reviewed this visit You Were Diagnosed With   
  
 Codes Comments Migraine without aura and without status migrainosus, not intractable    -  Primary ICD-10-CM: G43.009 ICD-9-CM: 346.10 Vitals BP Pulse Height(growth percentile) Weight(growth percentile) SpO2 BMI  
 106/74 (BP 1 Location: Left arm, BP Patient Position: Sitting) 96 5' 1\" (1.549 m) 155 lb (70.3 kg) 98% 29.29 kg/m2 OB Status Smoking Status Having regular periods Never Smoker Vitals History BMI and BSA Data Body Mass Index Body Surface Area  
 29.29 kg/m 2 1.74 m 2 Preferred Pharmacy Pharmacy Name Phone [facility], [address] [place] [address] Your Updated Medication List  
  
   
This list is accurate as of 3/9/18 11:33 AM.  Always use your most recent med list.  
  
  
  
  
 azelaic acid 15 % topical gel Commonly known as:  [facility] Apply  to affected area two (2) times a day. * BOTOX 200 unit injection Generic drug:  onabotulinumtoxinA INJECT 155 UNITS TO 31 FDA APPROVED SITES TO FACE AND NECK FOR MIGRAINES. DISCARD UNUSED PORTION  
  
 * onabotulinumtoxinA 200 unit injection Commonly known as:  BOTOX Inject 200 units IM to head and neck in 31 FDA approved sites every 3 months. COLACE PO Take  by mouth.  
  
 cyanocobalamin (vitamin B-12) 5,000 mcg Subl Commonly known as:  VITAMIN B-12  
5,000 mcg by SubLINGual route daily. Diclofenac Potassium 50 mg Pwpk Commonly known as:  CAMBIA  
1 packet by mouth at headache onset. May repeat again in 2 hours X 1 dose. Max 2 doses in 24 hours  
  
 dihydroergotamine 0.5 mg/pump act. (4 mg/mL) nasal spray Commonly known as:  MIGRANAL  
1 spray in each nostril at HA onset. May repeat again in 15 minutes X 1 dose MICROGESTIN FE 1.5/30 (28) 1.5 mg-30 mcg (21)/75 mg (7) Tab Generic drug:  norethindrone-ethinyl estradiol-iron QNASL 80 mcg/actuation Hfaa Generic drug:  beclomethasone dipropionate ZYRTEC PO Take  by mouth. * Notice: This list has 2 medication(s) that are the same as other medications prescribed for you. Read the directions carefully, and ask your doctor or other care provider to review them with you. We Performed the Following [address] [id number] CPT(R)] Introducing [facility]! Dear Rob Bansal: Thank you for requesting a Appboy account. Our records indicate that you already have an active Appboy account. You can access your account anytime at [email or web address] Did you know that you can access your hospital and ER discharge instructions at any time in Appboy? You can also review all of your test results from your hospital stay or ER visit. Additional Information If you have questions, please visit the Frequently Asked Questions section of the Appboy website at [email or web address] Remember, [employer] is NOT to be used for urgent needs. For medical emergencies, dial 911. Now available from your iPhone and Android! Please provide this summary of care documentation to your next provider. Your primary care clinician is listed as Mariusz Delgado. If you have any questions after today's visit, please call [phone].

## 2018-03-29 ENCOUNTER — OFFICE VISIT (OUTPATIENT)
Dept: INTERNAL MEDICINE CLINIC | Age: 28
End: 2018-03-29

## 2018-03-29 VITALS
TEMPERATURE: 98.5 F | HEIGHT: 61 IN | HEART RATE: 99 BPM | BODY MASS INDEX: 29.64 KG/M2 | WEIGHT: 157 LBS | RESPIRATION RATE: 16 BRPM | DIASTOLIC BLOOD PRESSURE: 80 MMHG | OXYGEN SATURATION: 98 % | SYSTOLIC BLOOD PRESSURE: 115 MMHG

## 2018-03-29 DIAGNOSIS — F41.9 ANXIETY: Primary | ICD-10-CM

## 2018-03-29 DIAGNOSIS — E53.8 B12 DEFICIENCY: ICD-10-CM

## 2018-03-29 RX ORDER — DULOXETIN HYDROCHLORIDE 20 MG/1
20 CAPSULE, DELAYED RELEASE ORAL DAILY
Qty: 30 CAP | Refills: 3 | Status: SHIPPED | OUTPATIENT
Start: 2018-03-29 | End: 2018-04-24 | Stop reason: SDUPTHER

## 2018-03-29 RX ORDER — ACETAMINOPHEN, DIPHENHYDRAMINE HCL, PHENYLEPHRINE HCL 325; 25; 5 MG/1; MG/1; MG/1
5000 TABLET ORAL DAILY
Qty: 30 TAB | Refills: 5 | Status: SHIPPED | OUTPATIENT
Start: 2018-03-29 | End: 2019-01-21

## 2018-03-29 RX ORDER — CEFDINIR 300 MG/1
CAPSULE ORAL
COMMUNITY
Start: 2018-03-20 | End: 2018-04-24 | Stop reason: ALTCHOICE

## 2018-03-29 NOTE — PATIENT INSTRUCTIONS
Anxiety Disorder: Care Instructions  Your Care Instructions    Anxiety is a normal reaction to stress. Difficult situations can cause you to have symptoms such as sweaty palms and a nervous feeling. In an anxiety disorder, the symptoms are far more severe. Constant worry, muscle tension, trouble sleeping, nausea and diarrhea, and other symptoms can make normal daily activities difficult or impossible. These symptoms may occur for no reason, and they can affect your work, school, or social life. Medicines, counseling, and self-care can all help. Follow-up care is a key part of your treatment and safety. Be sure to make and go to all appointments, and call your doctor if you are having problems. It's also a good idea to know your test results and keep a list of the medicines you take. How can you care for yourself at home? · Take medicines exactly as directed. Call your doctor if you think you are having a problem with your medicine. · Go to your counseling sessions and follow-up appointments. · Recognize and accept your anxiety. Then, when you are in a situation that makes you anxious, say to yourself, \"This is not an emergency. I feel uncomfortable, but I am not in danger. I can keep going even if I feel anxious. \"  · Be kind to your body:  ¨ Relieve tension with exercise or a massage. ¨ Get enough rest.  ¨ Avoid alcohol, caffeine, nicotine, and illegal drugs. They can increase your anxiety level and cause sleep problems. ¨ Learn and do relaxation techniques. See below for more about these techniques. · Engage your mind. Get out and do something you enjoy. Go to a funny movie, or take a walk or hike. Plan your day. Having too much or too little to do can make you anxious. · Keep a record of your symptoms. Discuss your fears with a good friend or family member, or join a support group for people with similar problems. Talking to others sometimes relieves stress.   · Get involved in social groups, or volunteer to help others. Being alone sometimes makes things seem worse than they are. · Get at least 30 minutes of exercise on most days of the week to relieve stress. Walking is a good choice. You also may want to do other activities, such as running, swimming, cycling, or playing tennis or team sports. Relaxation techniques  Do relaxation exercises 10 to 20 minutes a day. You can play soothing, relaxing music while you do them, if you wish. · Tell others in your house that you are going to do your relaxation exercises. Ask them not to disturb you. · Find a comfortable place, away from all distractions and noise. · Lie down on your back, or sit with your back straight. · Focus on your breathing. Make it slow and steady. · Breathe in through your nose. Breathe out through either your nose or mouth. · Breathe deeply, filling up the area between your navel and your rib cage. Breathe so that your belly goes up and down. · Do not hold your breath. · Breathe like this for 5 to 10 minutes. Notice the feeling of calmness throughout your whole body. As you continue to breathe slowly and deeply, relax by doing the following for another 5 to 10 minutes:  · Tighten and relax each muscle group in your body. You can begin at your toes and work your way up to your head. · Imagine your muscle groups relaxing and becoming heavy. · Empty your mind of all thoughts. · Let yourself relax more and more deeply. · Become aware of the state of calmness that surrounds you. · When your relaxation time is over, you can bring yourself back to alertness by moving your fingers and toes and then your hands and feet and then stretching and moving your entire body. Sometimes people fall asleep during relaxation, but they usually wake up shortly afterward. · Always give yourself time to return to full alertness before you drive a car or do anything that might cause an accident if you are not fully alert.  Never play a relaxation tape while you drive a car. When should you call for help? Call 911 anytime you think you may need emergency care. For example, call if:  ? · You feel you cannot stop from hurting yourself or someone else. ? Keep the numbers for these national suicide hotlines: 7-506-341-TALK (9-585.971.3693) and 8-355-BJAJMOX (7-209.461.7530). If you or someone you know talks about suicide or feeling hopeless, get help right away. ? Watch closely for changes in your health, and be sure to contact your doctor if:  ? · You have anxiety or fear that affects your life. ? · You have symptoms of anxiety that are new or different from those you had before. Where can you learn more? Go to http://lalitZEturfrose.info/. Enter P754 in the search box to learn more about \"Anxiety Disorder: Care Instructions. \"  Current as of: May 12, 2017  Content Version: 11.4  © 0947-7036 [employer]. Care instructions adapted under license by [employer] (which disclaims liability or warranty for this information). If you have questions about a medical condition or this instruction, always ask your healthcare professional. [place] [address] any warranty or liability for your use of this information. Learning About Anxiety Disorders  What are anxiety disorders? Anxiety disorders are a type of medical problem. They cause severe anxiety. When you feel anxious, you feel that something bad is about to happen. This feeling interferes with your life. These disorders include:  · Generalized anxiety disorder. You feel worried and stressed about many everyday events and activities. This goes on for several months and disrupts your life on most days. · Panic disorder. You have repeated panic attacks. A panic attack is a sudden, intense fear or anxiety. It may make you feel short of breath. Your heart may pound. · Social anxiety disorder.  You feel very anxious about what you will say or do in front of people. For example, you may be scared to talk or eat in public. This problem affects your daily life. · Phobias. You are very scared of a specific object, situation, or activity. For example, you may fear spiders, high places, or small spaces. What are the symptoms? Generalized anxiety disorder  Symptoms may include:  · Feeling worried and stressed about many things almost every day. · Feeling tired or irritable. You may have a hard time concentrating. · Having headaches or muscle aches. · Having a hard time getting to sleep or staying asleep. Panic disorder  You may have repeated panic attacks when there is no reason for feeling afraid. You may change your daily activities because you worry that you will have another attack. Symptoms may include:  · Intense fear, terror, or anxiety. · Trouble breathing or very fast breathing. · Chest pain or tightness. · A heartbeat that races or is not regular. Social anxiety disorder  Symptoms may include:  · Fear about a social situation, such as eating in front of others or speaking in public. You may worry a lot. Or you may be afraid that something bad will happen. · Anxiety that can cause you to blush, sweat, and feel shaky. · A heartbeat that is faster than normal.  · A hard time focusing. Phobias  Symptoms may include:  · More fear than most people of being around an object, being in a situation, or doing an activity. You might also be stressed about the chance of being around the thing you fear. · Worry about losing control, panicking, fainting, or having physical symptoms like a faster heartbeat when you are around the situation or object. How are these disorders treated? Anxiety disorders can be treated with medicines or counseling. A combination of both may be used. Medicines may include:  · Antidepressants. These may help your symptoms by keeping chemicals in your brain in balance. · Benzodiazepines.  These may give you short-term relief of your symptoms. Some people use cognitive-behavioral therapy. A therapist helps you learn to change stressful or bad thoughts into helpful thoughts. Lead a healthy lifestyle  A healthy lifestyle may help you feel better. · Get at least 30 minutes of exercise on most days of the week. Walking is a good choice. · Eat a healthy diet. Include fruits, vegetables, lean proteins, and whole grains in your diet each day. · Try to go to bed at the same time every night. Try for 8 hours of sleep a night. · Find ways to manage stress. Try relaxation exercises. · Avoid alcohol and illegal drugs. Follow-up care is a key part of your treatment and safety. Be sure to make and go to all appointments, and call your doctor if you are having problems. It's also a good idea to know your test results and keep a list of the medicines you take. Where can you learn more? Go to http://lalit-rose.info/. Enter N377 in the search box to learn more about \"Learning About Anxiety Disorders. \"  Current as of: May 12, 2017  Content Version: 11.4  © 8226-5389 [employer]. Care instructions adapted under license by [employer] (which disclaims liability or warranty for this information). If you have questions about a medical condition or this instruction, always ask your healthcare professional. [address] any warranty or liability for your use of this information.

## 2018-03-29 NOTE — PROGRESS NOTES
Marko Maxwell is a 25 y.o. female who presents today for Anxiety  . She has a history of   Patient Active Problem List   Diagnosis Code    Nephrolithiasis N20.0    Allergic rhinitis J30.9    Family history of early CAD Z80.55    Migraine without aura and without status migrainosus, not intractable G43.009    B12 deficiency E53.8   . Today patient is here for f/u. Problem visit:  Marko Maxwell is here for complaint of worsening anxiety. Problem began several year(s) ago. Severity is moderate  Character of problem: is getting worse. Social situations and ? Health issues can trigger these. Notes more daily anxiety. Associated symptoms include: no SI/HI. No drug use. Life is safe and stable. Work is good. Treatments tried include: none    Repeat B12 levels today. ROS  Review of Systems   Constitutional: Negative for chills, fever and weight loss. Eyes: Negative for blurred vision, double vision and photophobia. Respiratory: Negative for cough and shortness of breath. Cardiovascular: Negative for chest pain, palpitations and leg swelling. Gastrointestinal: Negative for abdominal pain, nausea and vomiting. Genitourinary: Negative for dysuria and urgency. Musculoskeletal: Negative for back pain, joint pain and neck pain. Neurological: Negative. Endo/Heme/Allergies: Does not bruise/bleed easily. Psychiatric/Behavioral: Negative for depression, hallucinations, memory loss, substance abuse and suicidal ideas. The patient is nervous/anxious. The patient does not have insomnia. Visit Vitals    /80 (BP 1 Location: Left arm, BP Patient Position: Sitting)    Pulse 99    Temp 98.5 °F (36.9 °C) (Oral)    Resp 16    Ht 5' 1\" (1.549 m)    Wt 157 lb (71.2 kg)    SpO2 98%    BMI 29.66 kg/m2       Physical Exam   Constitutional: She is oriented to person, place, and time. She appears well-developed and well-nourished. HENT:   Head: Normocephalic and atraumatic. Cardiovascular: Normal rate and regular rhythm. No murmur heard. Pulmonary/Chest: Effort normal. No respiratory distress. Abdominal: Soft. Bowel sounds are normal.   Neurological: She is alert and oriented to person, place, and time. Skin: Skin is warm and dry. No erythema. No pallor. Psychiatric: She has a normal mood and affect. Her behavior is normal.         Current Outpatient Prescriptions   Medication Sig    cefdinir (OMNICEF) 300 mg capsule     DULoxetine (CYMBALTA) 20 mg capsule Take 1 Cap by mouth daily.  cyanocobalamin, vitamin B-12, (VITAMIN B-12) 5,000 mcg subl 5,000 mcg by SubLINGual route daily.  onabotulinumtoxinA (BOTOX) 200 unit injection Inject 200 units IM to head and neck in 31 FDA approved sites every 3 months.  DOCUSATE SODIUM (COLACE PO) Take  by mouth.  dihydroergotamine (MIGRANAL) 0.5 mg/pump act. (4 mg/mL) nasal spray 1 spray in each nostril at HA onset. May repeat again in 15 minutes X 1 dose    azelaic acid (FINACEA) 15 % topical gel Apply  to affected area two (2) times a day.  Diclofenac Potassium (CAMBIA) 50 mg pwpk 1 packet by mouth at headache onset. May repeat again in 2 hours X 1 dose. Max 2 doses in 24 hours    MICROGESTIN FE 1.5/30, 28, 1.5 mg-30 mcg (21)/75 mg (7) tab     QNASL 80 mcg/actuation HFAA     CETIRIZINE HCL (ZYRTEC PO) Take  by mouth. No current facility-administered medications for this visit. Past Medical History:   Diagnosis Date    Acne     Allergic rhinitis 08/24/2016    shots Dr. Luisa Langston B12 deficiency 2/9/2017    Calculus of kidney     Headache     Migraine without aura and without status migrainosus, not intractable 01/19/2017    botox injections.   Dr. Dustin Duke    Nephrolithiasis 8/24/2016      Past Surgical History:   Procedure Laterality Date    HX LITHOTRIPSY        Social History   Substance Use Topics    Smoking status: Never Smoker    Smokeless tobacco: Never Used    Alcohol use Yes      Family History Problem Relation Age of Onset    Cancer Father      Prostate    Headache Father     Heart Disease Father     Diabetes Father         Allergies   Allergen Reactions    Salicylic Acid-Sulfur Anaphylaxis    Sulfur Unknown (comments)        Assessment/Plan  Diagnoses and all orders for this visit:    1. Anxiety - Worsening, discussed s/e and treatment. No family history besides mother with post partum. Will ask what she took. Over 50% of a visit of 25 minutes spent reviewing diagnosis and treatment options and side effects of medicines. -     DULoxetine (CYMBALTA) 20 mg capsule; Take 1 Cap by mouth daily. -     VITAMIN F16  -     METABOLIC PANEL, BASIC  -     CBC W/O DIFF    2. B12 deficiency - repeat. Now on PO.   -     cyanocobalamin, vitamin B-12, (VITAMIN B-12) 5,000 mcg subl; 5,000 mcg by SubLINGual route daily. -     VITAMIN R17  -     METABOLIC PANEL, BASIC  -     CBC W/O DIFF        Follow-up Disposition:  Return in about 4 weeks (around 4/26/2018).     Isabell Blanco MD  3/29/2018

## 2018-03-29 NOTE — MR AVS SNAPSHOT
[address] [facility] [address] 
 
 
 [address] [place] [facility] [address] [facility] 
[phone] Patient: Danita Sender MRN: [id number] FG Visit Information Date & Time Provider Department Dept. Phone Encounter #  
 3/29/2018  3:30 PM Margaret Galicia MD [facility] [address] [facility] [id number] Follow-up Instructions Return in about 4 weeks (around 2018). Your Appointments 2018  3:30 PM  
Any with Gloria Graham NP  [facility] ([facility]) Appt Note: 6 week f/u for botox [place]  [facility] [address] [facility] [address] [address] [phone]  
  
   
 [place] [address] [id number] [address] Upcoming Health Maintenance Date Due DTaP/Tdap/Td series (1 - Tdap) 2009 PAP AKA CERVICAL CYTOLOGY 2009 Influenza Age 5 to Adult 2017 Allergies as of 3/29/2018  Review Complete On: 3/29/2018 By: Margaret Galicia MD  
  
 Severity Noted Reaction Type Reactions Salicylic Acid-sulfur High 2017    Anaphylaxis Sulfur  04/15/2016    Unknown (comments) Current Immunizations  Never Reviewed No immunizations on file. Not reviewed this visit You Were Diagnosed With   
  
 Codes Comments Anxiety    -  Primary ICD-10-CM: F41.9 ICD-9-CM: 300.00   
 B12 deficiency     ICD-10-CM: E53.8 ICD-9-CM: 266.2 Vitals BP Pulse Temp Resp Height(growth percentile) Weight(growth percentile) 115/80 (BP 1 Location: Left arm, BP Patient Position: Sitting) 99 98.5 °F (36.9 °C) (Oral) 16 5' 1\" (1.549 m) 157 lb (71.2 kg) LMP SpO2 BMI OB Status Smoking Status 2018 98% 29.66 kg/m2 Having regular periods Never Smoker Vitals History BMI and BSA Data Body Mass Index Body Surface Area  
 29.66 kg/m 2 1.75 m 2 Preferred Pharmacy Pharmacy Name Phone [facility] [address] [facility] [address]  [facility] [phone] Your Updated Medication List  
  
   
This list is accurate as of 3/29/18  4:16 PM.  Always use your most recent med list.  
  
  
  
  
 azelaic acid 15 % topical gel Commonly known as:  [employer] Apply  to affected area two (2) times a day. cefdinir 300 mg capsule Commonly known as:  OMNICEF  
  
 COLACE PO Take  by mouth.  
  
 cyanocobalamin (vitamin B-12) 5,000 mcg Subl Commonly known as:  VITAMIN B-12  
5,000 mcg by SubLINGual route daily. Diclofenac Potassium 50 mg Pwpk Commonly known as:  CAMBIA  
1 packet by mouth at headache onset. May repeat again in 2 hours X 1 dose. Max 2 doses in 24 hours  
  
 dihydroergotamine 0.5 mg/pump act. (4 mg/mL) nasal spray Commonly known as:  MIGRANAL  
1 spray in each nostril at HA onset. May repeat again in 15 minutes X 1 dose DULoxetine 20 mg capsule Commonly known as:  CYMBALTA Take 1 Cap by mouth daily. MICROGESTIN FE 1.5/30 (28) 1.5 mg-30 mcg (21)/75 mg (7) Tab Generic drug:  norethindrone-ethinyl estradiol-iron  
  
 onabotulinumtoxinA 200 unit injection Commonly known as:  BOTOX Inject 200 units IM to head and neck in 31 FDA approved sites every 3 months. QNASL 80 mcg/actuation Hfaa Generic drug:  beclomethasone dipropionate ZYRTEC PO Take  by mouth. Prescriptions Sent to Pharmacy Refills DULoxetine (CYMBALTA) 20 mg capsule 3 Sig: Take 1 Cap by mouth daily. Class: Normal  
 Pharmacy: [facility] [address], [place] - [address] AT [facility] of [address] ([facility] Ph #: [phone] Route: Oral  
 cyanocobalamin, vitamin B-12, (VITAMIN B-12) 5,000 mcg subl 5 Si,000 mcg by SubLINGual route daily.   
 Class: Normal  
 Pharmacy: [facility] [address] [facility] [address] AT [facility] of [address] [facility] #: [phone] Route: SubLINGual  
  
Follow-up Instructions Return in about 4 weeks (around 4/26/2018). Patient Instructions Anxiety Disorder: Care Instructions Your Care Instructions Anxiety is a normal reaction to stress. Difficult situations can cause you to have symptoms such as sweaty palms and a nervous feeling. In an anxiety disorder, the symptoms are far more severe. Constant worry, muscle tension, trouble sleeping, nausea and diarrhea, and other symptoms can make normal daily activities difficult or impossible. These symptoms may occur for no reason, and they can affect your work, school, or social life. Medicines, counseling, and self-care can all help. Follow-up care is a key part of your treatment and safety. Be sure to make and go to all appointments, and call your doctor if you are having problems. It's also a good idea to know your test results and keep a list of the medicines you take. How can you care for yourself at home? · Take medicines exactly as directed. Call your doctor if you think you are having a problem with your medicine. · Go to your counseling sessions and follow-up appointments. · Recognize and accept your anxiety. Then, when you are in a situation that makes you anxious, say to yourself, \"This is not an emergency. I feel uncomfortable, but I am not in danger. I can keep going even if I feel anxious. \" · Be kind to your body: ¨ Relieve tension with exercise or a massage. ¨ Get enough rest. 
¨ Avoid alcohol, caffeine, nicotine, and illegal drugs. They can increase your anxiety level and cause sleep problems. ¨ Learn and do relaxation techniques. See below for more about these techniques. · Engage your mind. Get out and do something you enjoy. Go to a funny movie, or take a walk or hike. Plan your day. Having too much or too little to do can make you anxious. · Keep a record of your symptoms. Discuss your fears with a good friend or family member, or join a support group for people with similar problems. Talking to others sometimes relieves stress. · Get involved in social groups, or volunteer to help others. Being alone sometimes makes things seem worse than they are. · Get at least 30 minutes of exercise on most days of the week to relieve stress. Walking is a good choice. You also may want to do other activities, such as running, swimming, cycling, or playing tennis or team sports. Relaxation techniques Do relaxation exercises 10 to 20 minutes a day. You can play soothing, relaxing music while you do them, if you wish. · Tell others in your house that you are going to do your relaxation exercises. Ask them not to disturb you. · Find a comfortable place, away from all distractions and noise. · Lie down on your back, or sit with your back straight. · Focus on your breathing. Make it slow and steady. · Breathe in through your nose. Breathe out through either your nose or mouth. · Breathe deeply, filling up the area between your navel and your rib cage. Breathe so that your belly goes up and down. · Do not hold your breath. · Breathe like this for 5 to 10 minutes. Notice the feeling of calmness throughout your whole body. As you continue to breathe slowly and deeply, relax by doing the following for another 5 to 10 minutes: · Tighten and relax each muscle group in your body. You can begin at your toes and work your way up to your head. · Imagine your muscle groups relaxing and becoming heavy. · Empty your mind of all thoughts. · Let yourself relax more and more deeply. · Become aware of the state of calmness that surrounds you. · When your relaxation time is over, you can bring yourself back to alertness by moving your fingers and toes and then your hands and feet and then stretching and moving your entire body.  Sometimes people fall asleep during relaxation, but they usually wake up shortly afterward. · Always give yourself time to return to full alertness before you drive a car or do anything that might cause an accident if you are not fully alert. Never play a relaxation tape while you drive a car. When should you call for help? Call 911 anytime you think you may need emergency care. For example, call if: 
? · You feel you cannot stop from hurting yourself or someone else. ? Keep the numbers for these national suicide hotlines: 0-332-701-TALK (9-995.309.3484) and 4-512-KCSWNZZ (9-910.156.2808). If you or someone you know talks about suicide or feeling hopeless, get help right away. ? Watch closely for changes in your health, and be sure to contact your doctor if: 
? · You have anxiety or fear that affects your life. ? · You have symptoms of anxiety that are new or different from those you had before. Where can you learn more? Go to http://lalit-rose.info/. Enter P754 in the search box to learn more about \"Anxiety Disorder: Care Instructions. \" Current as of: May 12, 2017 Content Version: 11.4 © 6608-2852 [employer]. Care instructions adapted under license by [employer] (which disclaims liability or warranty for this information). If you have questions about a medical condition or this instruction, always ask your healthcare professional. [place] [address] any warranty or liability for your use of this information. Learning About Anxiety Disorders What are anxiety disorders? Anxiety disorders are a type of medical problem. They cause severe anxiety. When you feel anxious, you feel that something bad is about to happen. This feeling interferes with your life. These disorders include: · Generalized anxiety disorder. You feel worried and stressed about many everyday events and activities. This goes on for several months and disrupts your life on most days. · Panic disorder. You have repeated panic attacks. A panic attack is a sudden, intense fear or anxiety. It may make you feel short of breath. Your heart may pound. · Social anxiety disorder. You feel very anxious about what you will say or do in front of people. For example, you may be scared to talk or eat in public. This problem affects your daily life. · Phobias. You are very scared of a specific object, situation, or activity. For example, you may fear spiders, high places, or small spaces. What are the symptoms? Generalized anxiety disorder Symptoms may include: · Feeling worried and stressed about many things almost every day. · Feeling tired or irritable. You may have a hard time concentrating. · Having headaches or muscle aches. · Having a hard time getting to sleep or staying asleep. Panic disorder You may have repeated panic attacks when there is no reason for feeling afraid. You may change your daily activities because you worry that you will have another attack. Symptoms may include: 
· Intense fear, terror, or anxiety. · Trouble breathing or very fast breathing. · Chest pain or tightness. · A heartbeat that races or is not regular. Social anxiety disorder Symptoms may include: · Fear about a social situation, such as eating in front of others or speaking in public. You may worry a lot. Or you may be afraid that something bad will happen. · Anxiety that can cause you to blush, sweat, and feel shaky. · A heartbeat that is faster than normal. 
· A hard time focusing. Phobias Symptoms may include: · More fear than most people of being around an object, being in a situation, or doing an activity. You might also be stressed about the chance of being around the thing you fear. · Worry about losing control, panicking, fainting, or having physical symptoms like a faster heartbeat when you are around the situation or object. How are these disorders treated? Anxiety disorders can be treated with medicines or counseling. A combination of both may be used. Medicines may include: · Antidepressants. These may help your symptoms by keeping chemicals in your brain in balance. · Benzodiazepines. These may give you short-term relief of your symptoms. Some people use cognitive-behavioral therapy. A therapist helps you learn to change stressful or bad thoughts into helpful thoughts. Lead a healthy lifestyle A healthy lifestyle may help you feel better. · Get at least 30 minutes of exercise on most days of the week. Walking is a good choice. · Eat a healthy diet. Include fruits, vegetables, lean proteins, and whole grains in your diet each day. · Try to go to bed at the same time every night. Try for 8 hours of sleep a night. · Find ways to manage stress. Try relaxation exercises. · Avoid alcohol and illegal drugs. Follow-up care is a key part of your treatment and safety. Be sure to make and go to all appointments, and call your doctor if you are having problems. It's also a good idea to know your test results and keep a list of the medicines you take. Where can you learn more? Go to http://lalit-rose.info/. Enter F906 in the search box to learn more about \"Learning About Anxiety Disorders. \" Current as of: May 12, 2017 Content Version: 11.4 © 1670-1688 Healthwise[employer]. Care instructions adapted under license by [employer] (which disclaims liability or warranty for this information). If you have questions about a medical condition or this instruction, always ask your healthcare professional. [place] [address] any warranty or liability for your use of this information. Introducing [facility] & HEALTH SERVICES! Dear Manju Age: Thank you for requesting a [employer] account. Our records indicate that you already have an active Ant[employer] account.   You can access your account anytime at [email or web address] Did you know that you can access your hospital and ER discharge instructions at any time in [employer]? You can also review all of your test results from your hospital stay or ER visit. Additional Information If you have questions, please visit the Frequently Asked Questions section of the [employer] website at [email or web address] Remember, [employer] is NOT to be used for urgent needs. For medical emergencies, dial 911. Now available from your iPhone and Android! Please provide this summary of care documentation to your next provider. Your primary care clinician is listed as Yen Dimas. If you have any questions after today's visit, please call [phone].

## 2018-03-30 LAB
BUN SERPL-MCNC: 12 MG/DL (ref 6–20)
BUN/CREAT SERPL: 18 (ref 9–23)
CALCIUM SERPL-MCNC: 8.9 MG/DL (ref 8.7–10.2)
CHLORIDE SERPL-SCNC: 99 MMOL/L (ref 96–106)
CO2 SERPL-SCNC: 25 MMOL/L (ref 18–29)
CREAT SERPL-MCNC: 0.66 MG/DL (ref 0.57–1)
ERYTHROCYTE [DISTWIDTH] IN BLOOD BY AUTOMATED COUNT: 13.8 % (ref 12.3–15.4)
GFR SERPLBLD CREATININE-BSD FMLA CKD-EPI: 119 ML/MIN/1.73
GFR SERPLBLD CREATININE-BSD FMLA CKD-EPI: 137 ML/MIN/1.73
GLUCOSE SERPL-MCNC: 74 MG/DL (ref 65–99)
HCT VFR BLD AUTO: 38.9 % (ref 34–46.6)
HGB BLD-MCNC: 13.1 G/DL (ref 11.1–15.9)
MCH RBC QN AUTO: 28.7 PG (ref 26.6–33)
MCHC RBC AUTO-ENTMCNC: 33.7 G/DL (ref 31.5–35.7)
MCV RBC AUTO: 85 FL (ref 79–97)
PLATELET # BLD AUTO: 365 X10E3/UL (ref 150–379)
POTASSIUM SERPL-SCNC: 4.1 MMOL/L (ref 3.5–5.2)
RBC # BLD AUTO: 4.56 X10E6/UL (ref 3.77–5.28)
SODIUM SERPL-SCNC: 141 MMOL/L (ref 134–144)
VIT B12 SERPL-MCNC: >2000 PG/ML (ref 232–1245)
WBC # BLD AUTO: 12.6 X10E3/UL (ref 3.4–10.8)

## 2018-04-18 ENCOUNTER — OFFICE VISIT (OUTPATIENT)
Dept: NEUROLOGY | Age: 28
End: 2018-04-18

## 2018-04-18 VITALS
SYSTOLIC BLOOD PRESSURE: 124 MMHG | HEIGHT: 61 IN | WEIGHT: 149 LBS | BODY MASS INDEX: 28.13 KG/M2 | DIASTOLIC BLOOD PRESSURE: 86 MMHG

## 2018-04-18 DIAGNOSIS — G43.009 MIGRAINE WITHOUT AURA AND WITHOUT STATUS MIGRAINOSUS, NOT INTRACTABLE: ICD-10-CM

## 2018-04-18 NOTE — PROGRESS NOTES
Louie Wyman is a [age] [address] [place] y.o. female who presents with the following  Chief Complaint   Patient presents with    Follow-up       HPI  Patient comes in for a follow up for botox. Has not really had any headaches since last botox treatment in March 9. Not needed any cambia or OTC medications since last botox either. Has noticed she also has not really had to use the cambia but one time since I saw her last, which was before the last botox. She states she was in a meeting at work and noticed that looking out of her right eye things were blurry, fuzzy and noticed when she tried to focus on other people the right side of their bodies would be fuzzy. She did take a cambia at this time but still did develop a headache over the left eye and this lasted a few hours. This was the only time this has happened. She has been started on Cymbalta 20 mg with PCP and noticed this has helped her headaches also along with her situational anxiety. Would get palpitations, SOB at times and this has subsided since starting this. Overall she is feeling a lot better with the current treatment. 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 Allergies   Allergen Reactions    Salicylic Acid-Sulfur Anaphylaxis    Sulfur Unknown (comments)       Current Outpatient Prescriptions   Medication Sig    onabotulinumtoxinA (BOTOX) 200 unit injection Inject 200 units IM to head and neck in 31 FDA approved sites every 3 months.  cefdinir (OMNICEF) 300 mg capsule     DULoxetine (CYMBALTA) 20 mg capsule Take 1 Cap by mouth daily.  cyanocobalamin, vitamin B-12, (VITAMIN B-12) 5,000 mcg subl 5,000 mcg by SubLINGual route daily.  DOCUSATE SODIUM (COLACE PO) Take  by mouth.  azelaic acid (FINACEA) 15 % topical gel Apply  to affected area two (2) times a day.  Diclofenac Potassium (CAMBIA) 50 mg pwpk 1 packet by mouth at headache onset. May repeat again in 2 hours X 1 dose.  Max 2 doses in 24 hours    MICROGESTIN FE 1.5/30, 28, 1.5 mg-30 mcg (21)/75 mg (7) tab     QNASL 80 mcg/actuation HFAA     CETIRIZINE HCL (ZYRTEC PO) Take  by mouth. No current facility-administered medications for this visit. History   Smoking Status    Never Smoker   Smokeless Tobacco    Never Used       Past Medical History:   Diagnosis Date    Acne     Allergic rhinitis 08/24/2016    shots Dr. John Boland    B12 deficiency 2/9/2017    Calculus of kidney     Headache     Migraine without aura and without status migrainosus, not intractable 01/19/2017    botox injections. Dr. Melo Olivera    Nephrolithiasis 8/24/2016       Past Surgical History:   Procedure Laterality Date    HX LITHOTRIPSY         Family History   Problem Relation Age of Onset    Cancer Father      Prostate    Headache Father     Heart Disease Father     Diabetes Father        Social History     Social History    Marital status: SINGLE     Spouse name: N/A    Number of children: N/A    Years of education: N/A     Social History Main Topics    Smoking status: Never Smoker    Smokeless tobacco: Never Used    Alcohol use Yes    Drug use: No    Sexual activity: Not Currently     Other Topics Concern    None     Social History Narrative       Review of Systems   Eyes: Positive for blurred vision and photophobia. Negative for double vision. Respiratory: Negative for shortness of breath and wheezing. Gastrointestinal: Negative for nausea. Neurological: Positive for headaches. Negative for dizziness, seizures and loss of consciousness. Remainder of comprehensive review is negative.      Physical Exam :    Visit Vitals    /86    Ht 5' 1\" (1.549 m)    Wt 67.6 kg (149 lb)    LMP 03/29/2018    BMI 28.15 kg/m2       General: Well defined, nourished, and groomed individual in no acute distress.    Psych: Good mood and bright affect    NEUROLOGICAL EXAMINATION:    Mental Status: Alert and oriented to person, place, and time    Cranial Nerves:    II, III, IV, VI: Visual acuity grossly intact. Visual fields are normal.    Pupils are equal, round, and reactive to light and accommodation.    Extra-ocular movements are full and fluid. Fundoscopic exam was benign, no ptosis or nystagmus.    V-XII: Hearing is grossly intact. Facial features are symmetric, with normal sensation and strength. The palate rises symmetrically and the tongue protrudes midline. Sternocleidomastoids 5/5. Motor Examination: Normal tone, bulk, and strength, 5/5 muscle strength throughout. Coordination: Finger to nose was normal. No resting or intention tremor    Gait and Station: Steady while walking. Normal arm swing. No pronator drift. No muscle wasting or fasiculations noted. Reflexes: DTRs 2+ throughout. Results for orders placed or performed in visit on 03/29/18   VITAMIN B12   Result Value Ref Range    Vitamin B12 >2000 (H) 232 - 2435 pg/mL   METABOLIC PANEL, BASIC   Result Value Ref Range    Glucose 74 65 - 99 mg/dL    BUN 12 6 - 20 mg/dL    Creatinine 0.66 0.57 - 1.00 mg/dL    GFR est non- >59 mL/min/1.73    GFR est  >59 mL/min/1.73    BUN/Creatinine ratio 18 9 - 23    Sodium 141 134 - 144 mmol/L    Potassium 4.1 3.5 - 5.2 mmol/L    Chloride 99 96 - 106 mmol/L    CO2 25 18 - 29 mmol/L    Calcium 8.9 8.7 - 10.2 mg/dL   CBC W/O DIFF   Result Value Ref Range    WBC 12.6 (H) 3.4 - 10.8 x10E3/uL    RBC 4.56 3.77 - 5.28 x10E6/uL    HGB 13.1 11.1 - 15.9 g/dL    HCT 38.9 34.0 - 46.6 %    MCV 85 79 - 97 fL    MCH 28.7 26.6 - 33.0 pg    MCHC 33.7 31.5 - 35.7 g/dL    RDW 13.8 12.3 - 15.4 %    PLATELET 885 299 - 872 x10E3/uL       Orders Placed This Encounter    onabotulinumtoxinA (BOTOX) 200 unit injection     Sig: Inject 200 units IM to head and neck in 31 FDA approved sites every 3 months.      Dispense:  1 Vial     Refill:  0     Order Specific Question:   Site     Answer:   OTHER     Comments:   head/neck     Order Specific Question:   Expiration Date Answer:   8/30/2020     Order Specific Question:   Lot#     Answer:   [id number]     Order Specific Question:        Answer:   allergan       1. Migraine without aura and without status migrainosus, not intractable        Follow-up Disposition:  Return after botox. Migraines and headaches are going well on Botox. She states she has not had any headaches since the last treatment. The last headache she can remember was from before the treatment during her meeting which is described above. Cymbalta also seems to be helping her head and anxiety level without any side effects. Continue [facility] for abortive therapy as she has tried and failed a lot of other abortive therapies and this has worked well. Not really needing this a lot at all. Continue to track headaches and call with changes.        This note will not be viewable in XAPPmediat

## 2018-04-18 NOTE — PROGRESS NOTES
Patient is here for follow up after botox. Since starting an anxiety medication headaches have gone down. 1-2 per month . 1 in the last 3 weeks.

## 2018-04-18 NOTE — MR AVS SNAPSHOT
303 [facility] [address] [id number] [phone] Patient: Nicole Pennington MRN: [id number] [facility]:0/72/4541 Visit Information Date & Time Provider Department Dept. Phone Encounter #  
 4/18/2018  3:30 PM Delphine Day NP [facility]rology [facility] [phone] [id number] Follow-up Instructions Return after botox. Your Appointments 4/24/2018  3:30 PM  
ROUTINE CARE with Nii Emmanuel MD  
[facility] [address]) Appt Note: 4 wk eval  
 [address] [facility] [address]  
[phone]  
  
   
 [address] [facility] [address] Upcoming Health Maintenance Date Due DTaP/Tdap/Td series (1 - Tdap) 2/12/2009 PAP AKA CERVICAL CYTOLOGY 2/12/2009 Influenza Age 5 to Adult 8/1/2017 Allergies as of 4/18/2018  Review Complete On: 4/18/2018 By: Apolinar Rodrigues Severity Noted Reaction Type Reactions Salicylic Acid-sulfur High 07/06/2017    Anaphylaxis Sulfur  04/15/2016    Unknown (comments) Current Immunizations  Never Reviewed No immunizations on file. Not reviewed this visit You Were Diagnosed With   
  
 Codes Comments Migraine without aura and without status migrainosus, not intractable     ICD-10-CM: U57.010 ICD-9-CM: 346.10 Vitals BP Height(growth percentile) Weight(growth percentile) LMP BMI OB Status 124/86 5' 1\" (1.549 m) 149 lb (67.6 kg) 03/29/2018 28.15 kg/m2 Having regular periods Smoking Status Never Smoker BMI and BSA Data Body Mass Index Body Surface Area  
 28.15 kg/m 2 1.71 m 2 Preferred Pharmacy Pharmacy Name Phone [facility] 1 [address]  [facility] [phone] Your Updated Medication List  
  
   
 This list is accurate as of 4/18/18  4:01 PM.  Always use your most recent med list.  
  
  
  
  
 azelaic acid 15 % topical gel Commonly known as:  [employer] Apply  to affected area two (2) times a day. cefdinir 300 mg capsule Commonly known as:  OMNICEF  
  
 COLACE PO Take  by mouth.  
  
 cyanocobalamin (vitamin B-12) 5,000 mcg Subl Commonly known as:  VITAMIN B-12  
5,000 mcg by SubLINGual route daily. Diclofenac Potassium 50 mg Pwpk Commonly known as:  CAMBIA  
1 packet by mouth at headache onset. May repeat again in 2 hours X 1 dose. Max 2 doses in 24 hours DULoxetine 20 mg capsule Commonly known as:  CYMBALTA Take 1 Cap by mouth daily. MICROGESTIN FE 1.5/30 (28) 1.5 mg-30 mcg (21)/75 mg (7) Tab Generic drug:  norethindrone-ethinyl estradiol-iron  
  
 onabotulinumtoxinA 200 unit injection Commonly known as:  BOTOX Inject 200 units IM to head and neck in 31 FDA approved sites every 3 months. QNASL 80 mcg/actuation Hfaa Generic drug:  beclomethasone dipropionate ZYRTEC PO Take  by mouth. Prescriptions Printed Refills  
 onabotulinumtoxinA (BOTOX) 200 unit injection 0 Sig: Inject 200 units IM to head and neck in 31 FDA approved sites every 3 months. Class: Print Follow-up Instructions Return after botox. Patient Instructions PRESCRIPTION REFILL POLICY [facility] Statement to Patients April 1, 2014 In an effort to ensure the large volume of patient prescription refills is processed in the most efficient and expeditious manner, we are asking our patients to assist us by calling your Pharmacy for all prescription refills, this will include also your  Mail Order Pharmacy. The pharmacy will contact our office electronically to continue the refill process. Please do not wait until the last minute to call your pharmacy.  We need at least 48 hours (2days) to fill prescriptions. We also encourage you to call your pharmacy before going to  your prescription to make sure it is ready. With regard to controlled substance prescription refill requests (narcotic refills) that need to be picked up at our office, we ask your cooperation by providing us with at least 72 hours (3days) notice that you will need a refill. We will not refill narcotic prescription refill requests after 4:00pm on any weekday, Monday through Thursday, or after 2:00pm on Fridays, or on the weekends. We encourage everyone to explore another way of getting your prescription refill request processed using Huixiaoer, our patient web portal through our electronic medical record system. Huixiaoer is an efficient and effective way to communicate your medication request directly to the office and  downloadable as an nitza on your smart phone . Huixiaoer also features a review functionality that allows you to view your medication list as well as leave messages for your physician. Are you ready to get connected? If so please review the attatched instructions or speak to any of our staff to get you set up right away! Thank you so much for your cooperation. Should you have any questions please contact our Practice Administrator. The Physicians and Staff,  [facility] Introducing [facility]! Dear Burak Dave: Thank you for requesting a Huixiaoer account. Our records indicate that you already have an active Huixiaoer account. You can access your account anytime at [email or web address] Did you know that you can access your hospital and ER discharge instructions at any time in Huixiaoer? You can also review all of your test results from your hospital stay or ER visit. Additional Information If you have questions, please visit the Frequently Asked Questions section of the "[employer] website at [email or web address] Remember, "[employer] is NOT to be used for urgent needs. For medical emergencies, dial 911. Now available from your iPhone and Android! Please provide this summary of care documentation to your next provider. Your primary care clinician is listed as Yobany Drake. If you have any questions after today's visit, please call [phone].

## 2018-04-18 NOTE — PATIENT INSTRUCTIONS
10 [facility]   Statement to Patients  April 1, 2014      In an effort to ensure the large volume of patient prescription refills is processed in the most efficient and expeditious manner, we are asking our patients to assist us by calling your Pharmacy for all prescription refills, this will include also your  Mail Order Pharmacy. The pharmacy will contact our office electronically to continue the refill process. Please do not wait until the last minute to call your pharmacy. We need at least 48 hours (2days) to fill prescriptions. We also encourage you to call your pharmacy before going to  your prescription to make sure it is ready. With regard to controlled substance prescription refill requests (narcotic refills) that need to be picked up at our office, we ask your cooperation by providing us with at least 72 hours (3days) notice that you will need a refill. We will not refill narcotic prescription refill requests after 4:00pm on any weekday, Monday through Thursday, or after 2:00pm on Fridays, or on the weekends. We encourage everyone to explore another way of getting your prescription refill request processed using Oonair, our patient web portal through our electronic medical record system. [employer] is an efficient and effective way to communicate your medication request directly to the office and  downloadable as an nitza on your smart phone . [employer] also features a review functionality that allows you to view your medication list as well as leave messages for your physician. Are you ready to get connected? If so please review the attatched instructions or speak to any of our staff to get you set up right away! Thank you so much for your cooperation. Should you have any questions please contact our Practice Administrator.     The Physicians and Staff,  [facility]

## 2018-04-24 ENCOUNTER — OFFICE VISIT (OUTPATIENT)
Dept: INTERNAL MEDICINE CLINIC | Age: 28
End: 2018-04-24

## 2018-04-24 VITALS
TEMPERATURE: 98.6 F | RESPIRATION RATE: 16 BRPM | BODY MASS INDEX: 29.83 KG/M2 | OXYGEN SATURATION: 98 % | SYSTOLIC BLOOD PRESSURE: 126 MMHG | HEIGHT: 61 IN | WEIGHT: 158 LBS | DIASTOLIC BLOOD PRESSURE: 90 MMHG | HEART RATE: 111 BPM

## 2018-04-24 DIAGNOSIS — F41.9 ANXIETY: Primary | ICD-10-CM

## 2018-04-24 DIAGNOSIS — E53.8 B12 DEFICIENCY: ICD-10-CM

## 2018-04-24 DIAGNOSIS — L71.9 ROSACEA: ICD-10-CM

## 2018-04-24 RX ORDER — DULOXETIN HYDROCHLORIDE 30 MG/1
30 CAPSULE, DELAYED RELEASE ORAL DAILY
Qty: 30 CAP | Refills: 5 | Status: SHIPPED | OUTPATIENT
Start: 2018-04-24 | End: 2018-06-06 | Stop reason: SDUPTHER

## 2018-04-24 RX ORDER — OXYMETAZOLINE HCL 0.05 %
2 SPRAY, NON-AEROSOL (ML) NASAL 2 TIMES DAILY
COMMUNITY
End: 2019-07-19

## 2018-04-24 NOTE — MR AVS SNAPSHOT
303 [facility] 
 
 
 [address] [place] [facility] 
[phone] Patient: Tj Jo MRN: [id number] WUW:[birth year] Visit Information Date & Time Provider Department Dept. Phone Encounter #  
 4/24/2018  3:30 PM Jr Waldrop MD [facility] of [address] [id number] Upcoming Health Maintenance Date Due DTaP/Tdap/Td series (1 - Tdap) 2/12/2009 PAP AKA CERVICAL CYTOLOGY 2/12/2009 Influenza Age 5 to Adult 8/1/2017 Allergies as of 4/24/2018  Review Complete On: 4/24/2018 By: Jr Waldrop MD  
  
 Severity Noted Reaction Type Reactions Salicylic Acid-sulfur High 07/06/2017    Anaphylaxis Sulfur  04/15/2016    Unknown (comments) Current Immunizations  Never Reviewed No immunizations on file. Not reviewed this visit You Were Diagnosed With   
  
 Codes Comments Anxiety    -  Primary ICD-10-CM: F41.9 ICD-9-CM: 300.00   
 B12 deficiency     ICD-10-CM: E53.8 ICD-9-CM: 266.2 Rosacea     ICD-10-CM: L71.9 ICD-9-CM: 695.3 Vitals BP Pulse Temp Resp Height(growth percentile) Weight(growth percentile) 126/90 (BP 1 Location: Left arm, BP Patient Position: Sitting) (!) 111 98.6 °F (37 °C) (Oral) 16 5' 1\" (1.549 m) 158 lb (71.7 kg) LMP SpO2 BMI OB Status Smoking Status 03/29/2018 98% 29.85 kg/m2 Having regular periods Never Smoker Vitals History BMI and BSA Data Body Mass Index Body Surface Area  
 29.85 kg/m 2 1.76 m 2 Preferred Pharmacy Pharmacy Name Phone [facility] 1 [address]  [facility] [phone] Your Updated Medication List  
  
   
This list is accurate as of 4/24/18  4:28 PM.  Always use your most recent med list.  
  
  
  
  
 azelaic acid 15 % topical gel Commonly known as:  [employer] Apply  to affected area two (2) times a day.   
  
 COLACE PO  
 Take  by mouth.  
  
 cyanocobalamin (vitamin B-12) 5,000 mcg Subl Commonly known as:  VITAMIN B-12  
5,000 mcg by SubLINGual route daily. Diclofenac Potassium 50 mg Pwpk Commonly known as:  CAMBIA  
1 packet by mouth at headache onset. May repeat again in 2 hours X 1 dose. Max 2 doses in 24 hours DULoxetine 30 mg capsule Commonly known as:  CYMBALTA Take 1 Cap by mouth daily. MICROGESTIN FE 1.5/30 (28) 1.5 mg-30 mcg (21)/75 mg (7) Tab Generic drug:  norethindrone-ethinyl estradiol-iron  
  
 onabotulinumtoxinA 200 unit injection Commonly known as:  BOTOX Inject 200 units IM to head and neck in 31 FDA approved sites every 3 months. oxymetazoline 0.05 % nasal spray Commonly known as:  AFRIN  
2 Sprays two (2) times a day. QNASL 80 mcg/actuation Hfaa Generic drug:  beclomethasone dipropionate ZYRTEC PO Take  by mouth. Prescriptions Sent to Pharmacy Refills DULoxetine (CYMBALTA) 30 mg capsule 5 Sig: Take 1 Cap by mouth daily. Class: Normal  
 Pharmacy: [facility] [address] [place] [address] [address] AT [facility] of [address] ([facility] Ph #: [phone] Route: Oral  
  
Introducing [facility]! Dear Gibson House: Thank you for requesting a TaxiBeat account. Our records indicate that you already have an active TaxiBeat account. You can access your account anytime at [email or web address] Did you know that you can access your hospital and ER discharge instructions at any time in TaxiBeat? You can also review all of your test results from your hospital stay or ER visit. Additional Information If you have questions, please visit the Frequently Asked Questions section of the Tax[employer]Beat website at [email or web address] Remember, TaxiBeat is NOT to be used for urgent needs. For medical emergencies, dial 911. Now available from your iPhone and Android! Please provide this summary of care documentation to your next provider. Your primary care clinician is listed as Jr Waldrop. If you have any questions after today's visit, please call [phone].

## 2018-04-24 NOTE — PROGRESS NOTES
Irene Engel is a 25 y.o. female who presents today for Vitamin B12 Deficiency and Anxiety  . She has a history of   Patient Active Problem List   Diagnosis Code    Nephrolithiasis N20.0    Allergic rhinitis J30.9    Family history of early CAD Z80.55    Migraine without aura and without status migrainosus, not intractable G43.009    B12 deficiency E53.8   . Today patient is here for f/u. Anxiety - Started SNRI and notes some pretty good improvement. Notes that overall anxiety better. Patient is seen for anxiety disorder, panic attacks. Current treatment includes cymbalta and no other therapies. Ongoing symptoms include: palpitations, racing thoughts. Patient denies: sweating, chest pain, dizziness, paresthesias, feelings of losing control, difficulty concentrating, suicidal ideation. Denies substance or alcohol abuse. Reported side effects from the treatment: GI upset. (Diarrhea and Constipation)     B12 deficiency: On PO. Good levels. Migraines are ok. One today. Rosacea: treated with doxy in the past. Not much help. Does get flushing type symptoms. No pain. ROS  Review of Systems   Constitutional: Negative for chills, fever and weight loss. Eyes: Negative for blurred vision. Respiratory: Negative for cough and shortness of breath. Cardiovascular: Negative for chest pain, palpitations and leg swelling. Gastrointestinal: Negative for abdominal pain, nausea and vomiting. Genitourinary: Negative for dysuria, frequency and urgency. Skin: Positive for rash. Negative for itching. Neurological: Negative. Endo/Heme/Allergies: Does not bruise/bleed easily. Psychiatric/Behavioral: Negative for depression, substance abuse and suicidal ideas. The patient is nervous/anxious. The patient does not have insomnia.         Visit Vitals    /90 (BP 1 Location: Left arm, BP Patient Position: Sitting)    Pulse (!) 111    Temp 98.6 °F (37 °C) (Oral)    Resp 16    Ht 5' 1\" (1.549 m)    Wt 158 lb (71.7 kg)    SpO2 98%    BMI 29.85 kg/m2       Physical Exam   Constitutional: She is oriented to person, place, and time. She appears well-developed and well-nourished. HENT:   Head: Normocephalic and atraumatic. Cardiovascular: Normal rate and regular rhythm. No murmur heard. Pulmonary/Chest: Effort normal. No respiratory distress. Neurological: She is alert and oriented to person, place, and time. Skin: Skin is warm and dry. Facial redness   Psychiatric: She has a normal mood and affect. Her behavior is normal.         Current Outpatient Prescriptions   Medication Sig    oxymetazoline (AFRIN) 0.05 % nasal spray 2 Sprays two (2) times a day.  DULoxetine (CYMBALTA) 30 mg capsule Take 1 Cap by mouth daily.  onabotulinumtoxinA (BOTOX) 200 unit injection Inject 200 units IM to head and neck in 31 FDA approved sites every 3 months.  cyanocobalamin, vitamin B-12, (VITAMIN B-12) 5,000 mcg subl 5,000 mcg by SubLINGual route daily.  DOCUSATE SODIUM (COLACE PO) Take  by mouth.  azelaic acid (FINACEA) 15 % topical gel Apply  to affected area two (2) times a day.  Diclofenac Potassium (CAMBIA) 50 mg pwpk 1 packet by mouth at headache onset. May repeat again in 2 hours X 1 dose. Max 2 doses in 24 hours    MICROGESTIN FE 1.5/30, 28, 1.5 mg-30 mcg (21)/75 mg (7) tab     QNASL 80 mcg/actuation HFAA     CETIRIZINE HCL (ZYRTEC PO) Take  by mouth. No current facility-administered medications for this visit. Past Medical History:   Diagnosis Date    Acne     Allergic rhinitis 08/24/2016    shots Dr. Gloria Bond B12 deficiency 2/9/2017    Calculus of kidney     Headache     Migraine without aura and without status migrainosus, not intractable 01/19/2017    botox injections.   Dr. Mari Newby    Nephrolithiasis 8/24/2016      Past Surgical History:   Procedure Laterality Date    HX LITHOTRIPSY        Social History   Substance Use Topics    Smoking status: Never Smoker    Smokeless tobacco: Never Used    Alcohol use Yes      Family History   Problem Relation Age of Onset    Cancer Father      Prostate    Headache Father     Heart Disease Father     Diabetes Father         Allergies   Allergen Reactions    Salicylic Acid-Sulfur Anaphylaxis    Sulfur Unknown (comments)        Assessment/Plan  Diagnoses and all orders for this visit:    1. Anxiety - doing better. Some D/C with this. Try probiotic. Increase to 30mg. Revisit in 3 mos. -     DULoxetine (CYMBALTA) 30 mg capsule; Take 1 Cap by mouth daily. 2. B12 deficiency - stable on PO    3. Rosacea - will try topical oxymetazoline per new recs. Try afrin given price of cream.     Over 50% of a visit of 25 minutes spent reviewing diagnosis and treatment options and side effects of medicines.           Follow-up Disposition: Not on File    Danie Murrieta MD  4/24/2018

## 2018-06-04 ENCOUNTER — TELEPHONE (OUTPATIENT)
Dept: NEUROLOGY | Age: 28
End: 2018-06-04

## 2018-06-06 ENCOUNTER — OFFICE VISIT (OUTPATIENT)
Dept: INTERNAL MEDICINE CLINIC | Age: 28
End: 2018-06-06

## 2018-06-06 VITALS
OXYGEN SATURATION: 99 % | HEIGHT: 61 IN | TEMPERATURE: 98 F | RESPIRATION RATE: 18 BRPM | BODY MASS INDEX: 29.45 KG/M2 | DIASTOLIC BLOOD PRESSURE: 81 MMHG | SYSTOLIC BLOOD PRESSURE: 118 MMHG | HEART RATE: 77 BPM | WEIGHT: 156 LBS

## 2018-06-06 DIAGNOSIS — F41.9 ANXIETY: ICD-10-CM

## 2018-06-06 RX ORDER — DULOXETIN HYDROCHLORIDE 60 MG/1
60 CAPSULE, DELAYED RELEASE ORAL DAILY
Qty: 30 CAP | Refills: 5 | Status: SHIPPED | OUTPATIENT
Start: 2018-06-06 | End: 2018-11-22 | Stop reason: SDUPTHER

## 2018-06-06 NOTE — PROGRESS NOTES
Av House is a 25 y.o. female who presents today for No chief complaint on file. .      She has a history of   Patient Active Problem List   Diagnosis Code    Nephrolithiasis N20.0    Allergic rhinitis J30.9    Family history of early CAD Z80.55    Migraine without aura and without status migrainosus, not intractable G43.009    B12 deficiency E53.8   . Today patient is here for f/u. Anxiety - Much better with cymbalta, but recently anxiety became a bit worse. Patient is seen for anxiety disorder. Current treatment includes Cymbalta and no other therapies. Ongoing symptoms include: palpitations, paresthesias, racing thoughts. Patient denies: sweating, chest pain, dizziness, paresthesias, racing thoughts, feelings of losing control, difficulty concentrating, suicidal ideation. Denies substance or alcohol abuse. Reported side effects from the treatment: none      ROS  Review of Systems   Constitutional: Negative for chills, fever and weight loss. HENT: Negative for congestion, ear discharge, ear pain, hearing loss, nosebleeds, sinus pain and tinnitus. Ear fullness   Eyes: Negative for blurred vision, double vision and photophobia. Respiratory: Negative for cough, hemoptysis, sputum production and shortness of breath. Cardiovascular: Negative for chest pain, palpitations, orthopnea, claudication, leg swelling and PND. Gastrointestinal: Negative for abdominal pain, blood in stool, constipation, diarrhea, heartburn, nausea and vomiting. Genitourinary: Negative for dysuria, frequency, hematuria and urgency. Musculoskeletal: Negative for back pain, joint pain, myalgias and neck pain. Skin: Negative for itching and rash. Neurological: Negative. Endo/Heme/Allergies: Does not bruise/bleed easily. Psychiatric/Behavioral: Negative for depression, hallucinations, substance abuse and suicidal ideas. The patient is nervous/anxious. The patient does not have insomnia.         Visit Vitals    /81 (BP 1 Location: Left arm, BP Patient Position: Sitting)    Pulse 77    Temp 98 °F (36.7 °C) (Oral)    Resp 18    Ht 5' 1\" (1.549 m)    Wt 156 lb (70.8 kg)    SpO2 99%    BMI 29.48 kg/m2       Physical Exam   Constitutional: She appears well-developed and well-nourished. HENT:   Head: Normocephalic and atraumatic. Fluid behind L TM   Cardiovascular: Normal rate and regular rhythm. No murmur heard. Pulmonary/Chest: Effort normal and breath sounds normal. No respiratory distress. Abdominal: Soft. Bowel sounds are normal.         Current Outpatient Prescriptions   Medication Sig    Bifidobacterium Infantis (ALIGN) 4 mg cap Take  by mouth.  DULoxetine (CYMBALTA) 60 mg capsule Take 1 Cap by mouth daily.  oxymetazoline (AFRIN) 0.05 % nasal spray 2 Sprays two (2) times a day.  onabotulinumtoxinA (BOTOX) 200 unit injection Inject 200 units IM to head and neck in 31 FDA approved sites every 3 months.  cyanocobalamin, vitamin B-12, (VITAMIN B-12) 5,000 mcg subl 5,000 mcg by SubLINGual route daily.  Diclofenac Potassium (CAMBIA) 50 mg pwpk 1 packet by mouth at headache onset. May repeat again in 2 hours X 1 dose. Max 2 doses in 24 hours    MICROGESTIN FE 1.5/30, 28, 1.5 mg-30 mcg (21)/75 mg (7) tab     QNASL 80 mcg/actuation HFAA     CETIRIZINE HCL (ZYRTEC PO) Take  by mouth.  DOCUSATE SODIUM (COLACE PO) Take  by mouth.  azelaic acid (FINACEA) 15 % topical gel Apply  to affected area two (2) times a day. No current facility-administered medications for this visit. Past Medical History:   Diagnosis Date    Acne     Allergic rhinitis 08/24/2016    shots Dr. Andrew Roblero B12 deficiency 2/9/2017    Calculus of kidney     Headache     Migraine without aura and without status migrainosus, not intractable 01/19/2017    botox injections.   Dr. Cabrera Strickland    Nephrolithiasis 8/24/2016      Past Surgical History:   Procedure Laterality Date    HX LITHOTRIPSY Social History   Substance Use Topics    Smoking status: Never Smoker    Smokeless tobacco: Never Used    Alcohol use Yes      Family History   Problem Relation Age of Onset    Cancer Father      Prostate    Headache Father     Heart Disease Father     Diabetes Father         Allergies   Allergen Reactions    Salicylic Acid-Sulfur Anaphylaxis    Sulfur Unknown (comments)        Assessment/Plan  Diagnoses and all orders for this visit:    1. Anxiety - efficacy down a bit. Suggest increase to 60mg. If s/e or not helpful add buspar.   -     DULoxetine (CYMBALTA) 60 mg capsule; Take 1 Cap by mouth daily. Follow-up Disposition:  Return if symptoms worsen or fail to improve.     Sissy Devlin MD  6/6/2018

## 2018-06-06 NOTE — PATIENT INSTRUCTIONS
Buspirone (By mouth)   Buspirone ([id number])  Treats anxiety. Brand Name(s):   There may be other brand names for this medicine. When This Medicine Should Not Be Used: You should not use this medicine if you have had an allergic reaction to buspirone. How to Use This Medicine:   Tablet  · Your doctor will tell you how much of this medicine to take and how often. Do not take more medicine or take it more often than your doctor tells you to. · You may take this medicine with or without food, but take it the same way each time. · You may need to take this medicine for 1 or 2 weeks before you begin to feel better. If a dose is missed:   · If you miss a dose or forget to take your medicine, take it as soon as you can. If it is almost time for your next dose, wait until then to take the medicine and skip the missed dose. · Do not use extra medicine to make up for a missed dose. How to Store and Dispose of This Medicine:   · Store the medicine at room temperature, away from heat, moisture, and direct light. · Keep all medicine out of the reach of children and never share your medicine with anyone. Drugs and Foods to Avoid:   Ask your doctor or pharmacist before using any other medicine, including over-the-counter medicines, vitamins, and herbal products. · You should not use buspirone when you are also using an MAO inhibitor (such as Eldepryl®, Marplan®, Nardil®, Parnate®). · Do not eat grapefruit, drink grapefruit juice, or drink alcohol while you are using buspirone. · Make sure your doctor knows if you are also using cimetidine (Wynell Ode), dexamethasone (Decadron®), diltiazem (Arther Halls), erythromycin (Erythro-Tab®), itraconazole (Sporanox®), nefazodone (Serzone®), rifampin (Rifadin®, Rifamate®, Rifater®), verapamil (Montrell Benne), or medicine for seizures (such as Dilantin®, Luminal®, Tegretol®).   · Make sure your doctor knows if you are using any medicines that make you sleepy (such as sleeping pills, cold and allergy medicine, narcotic pain relievers, or sedatives). Warnings While Using This Medicine:   · Make sure your doctor knows if you are pregnant or breastfeeding, or if you have kidney or liver disease. · Do not stop using this medicine suddenly without asking your doctor. You may need to slowly decrease your dose before stopping it completely. · This medicine may make you dizzy or drowsy. Avoid driving, using machines, or doing anything else that could be dangerous if you are not alert. Possible Side Effects While Using This Medicine:   Call your doctor right away if you notice any of these side effects:  · Fast or pounding heartbeat  · Numbness or tingling feeling  · Tremors or shaking  If you notice these less serious side effects, talk with your doctor:   · Drowsiness or weakness  · Dry mouth  · Feeling restless or nervous, trouble sleeping  · Headache  · Nausea, constipation, upset stomach  If you notice other side effects that you think are caused by this medicine, tell your doctor. Call your doctor for medical advice about side effects. You may report side effects to FDA at 4-614-FDA-4164  © 2017 [address] Information is for End User's use only and may not be sold, redistributed or otherwise used for commercial purposes. The above information is an  only. It is not intended as medical advice for individual conditions or treatments. Talk to your doctor, nurse or pharmacist before following any medical regimen to see if it is safe and effective for you.

## 2018-06-06 NOTE — MR AVS SNAPSHOT
Gareth Calderon 
 
 
 [address] [facility] [address] [facility] 
[phone] Patient: Simon Jack MRN: [id number] BST:[birth year] Visit Information Date & Time Provider Department Dept. Phone Encounter #  
 6/6/2018 11:45 AM Carly Johnson MD [facility] [address]  [id number] Upcoming Health Maintenance Date Due DTaP/Tdap/Td series (1 - Tdap) 2/12/2009 PAP AKA CERVICAL CYTOLOGY 2/12/2009 Influenza Age 5 to Adult 8/1/2018 Allergies as of 6/6/2018  Review Complete On: 6/6/2018 By: Carly Johnson MD  
  
 Severity Noted Reaction Type Reactions Salicylic Acid-sulfur High 07/06/2017    Anaphylaxis Sulfur  04/15/2016    Unknown (comments) Current Immunizations  Never Reviewed No immunizations on file. Not reviewed this visit You Were Diagnosed With   
  
 Codes Comments Anxiety     ICD-10-CM: F41.9 ICD-9-CM: 300.00 Vitals BP Pulse Temp Resp Height(growth percentile) Weight(growth percentile) 118/81 (BP 1 Location: Left arm, BP Patient Position: Sitting) 77 98 °F (36.7 °C) (Oral) 18 5' 1\" (1.549 m) 156 lb (70.8 kg) LMP SpO2 BMI OB Status Smoking Status 05/23/2018 (Approximate) 99% 29.48 kg/m2 Having regular periods Never Smoker Vitals History BMI and BSA Data Body Mass Index Body Surface Area  
 29.48 kg/m 2 1.75 m 2 Preferred Pharmacy Pharmacy Name Phone [facility] [address]  [facility] [phone] Your Updated Medication List  
  
   
This list is accurate as of 6/6/18 12:25 PM.  Always use your most recent med list.  
  
  
  
  
 ALIGN 4 mg Cap Generic drug:  Bifidobacterium Infantis Take  by mouth. azelaic acid 15 % topical gel Commonly known as:  Informative Apply  to affected area two (2) times a day. COLACE PO Take  by mouth. cyanocobalamin (vitamin B-12) 5,000 mcg Subl Commonly known as:  VITAMIN B-12  
5,000 mcg by SubLINGual route daily. Diclofenac Potassium 50 mg Pwpk Commonly known as:  CAMBIA  
1 packet by mouth at headache onset. May repeat again in 2 hours X 1 dose. Max 2 doses in 24 hours DULoxetine 60 mg capsule Commonly known as:  CYMBALTA Take 1 Cap by mouth daily. MICROGESTIN FE 1.5/30 (28) 1.5 mg-30 mcg (21)/75 mg (7) Tab Generic drug:  norethindrone-ethinyl estradiol-iron  
  
 onabotulinumtoxinA 200 unit injection Commonly known as:  BOTOX Inject 200 units IM to head and neck in 31 FDA approved sites every 3 months. oxymetazoline 0.05 % nasal spray Commonly known as:  AFRIN  
2 Sprays two (2) times a day. QNASL 80 mcg/actuation Hfaa Generic drug:  beclomethasone dipropionate ZYRTEC PO Take  by mouth. Prescriptions Sent to Pharmacy Refills DULoxetine (CYMBALTA) 60 mg capsule 5 Sig: Take 1 Cap by mouth daily. Class: Normal  
 Pharmacy: [facility] [address] [place] [address] [address] AT [facility] [address] ([facility] Ph #: [phone] Route: Oral  
  
Patient Instructions Buspirone (By mouth) Buspirone ([id number]) Treats anxiety. Brand Name(s):  
There may be other brand names for this medicine. When This Medicine Should Not Be Used: You should not use this medicine if you have had an allergic reaction to buspirone. How to Use This Medicine:  
Tablet · Your doctor will tell you how much of this medicine to take and how often. Do not take more medicine or take it more often than your doctor tells you to. · You may take this medicine with or without food, but take it the same way each time. · You may need to take this medicine for 1 or 2 weeks before you begin to feel better. If a dose is missed: · If you miss a dose or forget to take your medicine, take it as soon as you can. If it is almost time for your next dose, wait until then to take the medicine and skip the missed dose. · Do not use extra medicine to make up for a missed dose. How to Store and Dispose of This Medicine: · Store the medicine at room temperature, away from heat, moisture, and direct light. · Keep all medicine out of the reach of children and never share your medicine with anyone. Drugs and Foods to Avoid: Ask your doctor or pharmacist before using any other medicine, including over-the-counter medicines, vitamins, and herbal products. · You should not use buspirone when you are also using an MAO inhibitor (such as Eldepryl®, Marplan®, Nardil®, Parnate®). · Do not eat grapefruit, drink grapefruit juice, or drink alcohol while you are using buspirone. · Make sure your doctor knows if you are also using cimetidine (Wynell Ode), dexamethasone (Decadron®), diltiazem (Arther Halls), erythromycin (Erythro-Tab®), itraconazole (Sporanox®), nefazodone (Serzone®), rifampin (Rifadin®, Rifamate®, Rifater®), verapamil (Montrell Benne), or medicine for seizures (such as Dilantin®, Luminal®, Tegretol®). · Make sure your doctor knows if you are using any medicines that make you sleepy (such as sleeping pills, cold and allergy medicine, narcotic pain relievers, or sedatives). Warnings While Using This Medicine: · Make sure your doctor knows if you are pregnant or breastfeeding, or if you have kidney or liver disease. · Do not stop using this medicine suddenly without asking your doctor. You may need to slowly decrease your dose before stopping it completely. · This medicine may make you dizzy or drowsy. Avoid driving, using machines, or doing anything else that could be dangerous if you are not alert. Possible Side Effects While Using This Medicine:  
Call your doctor right away if you notice any of these side effects: 
· Fast or pounding heartbeat · Numbness or tingling feeling · Tremors or shaking If you notice these less serious side effects, talk with your doctor: · Drowsiness or weakness · Dry mouth · Feeling restless or nervous, trouble sleeping · Headache · Nausea, constipation, upset stomach If you notice other side effects that you think are caused by this medicine, tell your doctor. Call your doctor for medical advice about side effects. You may report side effects to FDA at 5-054-WFX-5725 © 2017 [facility] Information is for End User's use only and may not be sold, redistributed or otherwise used for commercial purposes. The above information is an  only. It is not intended as medical advice for individual conditions or treatments. Talk to your doctor, nurse or pharmacist before following any medical regimen to see if it is safe and effective for you. Introducing [facility] & [facility] SERVICES! Dear Joanna Canales: Thank you for requesting a [employer] account. Our records indicate that you already have an active [employer] account. You can access your account anytime at https://[employer]. Bluebox/[employer] Did you know that you can access your hospital and ER discharge instructions at any time in [employer]? You can also review all of your test results from your hospital stay or ER visit. Additional Information If you have questions, please visit the Frequently Asked Questions section of the [employer] website at [email or web address]/. Remember, [employer] is NOT to be used for urgent needs. For medical emergencies, dial 911. Now available from your iPhone and Android! Please provide this summary of care documentation to your next provider. Your primary care clinician is listed as Amy Sharma. If you have any questions after today's visit, please call [phone].

## 2018-06-07 ENCOUNTER — TELEPHONE (OUTPATIENT)
Dept: NEUROLOGY | Age: 28
End: 2018-06-07

## 2018-06-07 NOTE — TELEPHONE ENCOUNTER
Called and spoke to patient. She will take that appointment time with Dr. Sho Keller for botox. She is aware that she is being worked in and may need to wait if they are running behind. Per NP:  The only thing you can offer is the 11th at 9:20am. Make her aware that she is being worked in and she may have to wait if we run behind.  (Routing comment)

## 2018-06-07 NOTE — TELEPHONE ENCOUNTER
Botox 200 units received   [facility]   [phone]  2 refills remaining   KD:[phone]

## 2018-06-11 ENCOUNTER — OFFICE VISIT (OUTPATIENT)
Dept: NEUROLOGY | Age: 28
End: 2018-06-11

## 2018-06-11 VITALS
OXYGEN SATURATION: 99 % | SYSTOLIC BLOOD PRESSURE: 110 MMHG | DIASTOLIC BLOOD PRESSURE: 78 MMHG | HEIGHT: 61 IN | BODY MASS INDEX: 29.45 KG/M2 | HEART RATE: 126 BPM | WEIGHT: 156 LBS

## 2018-06-11 DIAGNOSIS — G43.009 MIGRAINE WITHOUT AURA AND WITHOUT STATUS MIGRAINOSUS, NOT INTRACTABLE: Primary | ICD-10-CM

## 2018-06-11 NOTE — PROGRESS NOTES
Botox Procedure  Pain scale prior to procedure   0/10  Pain scale post procedure     0/10  Patient states 1-2  headaches a month  Been treated for headaches greater than 6 months  Consent signed     Instructions post injection discussed with patient   1. Do not lay flat for 4 hrs  2. Do not press on injection sites up to 24 hrs.         What to expect  Possible bleeding and/or swelling  at injection sites      Patient verbalizes understanding

## 2018-06-11 NOTE — PROCEDURES
[facility] [place]  OFFICE PROCEDURE PROGRESS NOTE        Chart reviewed for the following:   Daiana Prescott MD, have reviewed the History, Physical and updated the Allergic reactions for [address] performed immediately prior to start of procedure:   Daiana Prescott MD, have performed the following reviews on Richard Baez prior to the start of the procedure:            * Patient was identified by name and date of birth   * Agreement on procedure being performed was verified  * Risks and Benefits explained to the patient  * Procedure site verified and marked as necessary  * Patient was positioned for comfort  * Consent was signed and verified     Time: 0930  Date of procedure: 6/11/2018  Procedure performed by:  Moreno Martin MD  Provider assisted by: None  Patient assisted by: self  How tolerated by patient: tolerated the procedure well with no complications  Comments: None    Botox Injection Note    Indication:   Patient has chronic migraine and presents for repeat Botox Injection as it has reduced overall headahce frequency including migraines (see clinic notes for details). Procedure:    Botox concentration: 200 units in 4 ml of preservative-free normal saline.   31 sites injections, distribution as follow         Units/site Sites Sides subtotal  Procerus     5  1 1 5        5   1 2 10  Frontalis     5  2 2 20  Temporalis    5  4 2 40  Occipitalis    5  3 2 30   Upper cervical paraspinalis  5  2 2 20   Trapezius    5  3 2 30    200 units Botox were reconstituted, 155 units injected as above and the remainder was unavoidably wasted    Patient tolerated procedure well.      _____________________________  Moreno Martin M.D.

## 2018-06-11 NOTE — MR AVS SNAPSHOT
[address] [facility] [address] [facility] [address] [phone] Patient: Cat Ngo MRN: [id number] DYH:2[birth year] Visit Information Date & Time Provider Department Dept. Phone Encounter #  
 6/11/2018  9:20 AM Nikita Estrada MD [facility] [phone] [id number] Follow-up Instructions Return in about 6 weeks (around 7/23/2018). Upcoming Health Maintenance Date Due DTaP/Tdap/Td series (1 - Tdap) 2/12/2009 PAP AKA CERVICAL CYTOLOGY 2/12/2009 Influenza Age 5 to Adult 8/1/2018 Allergies as of 6/11/2018  Review Complete On: 6/11/2018 By: Shant Munoz LPN Severity Noted Reaction Type Reactions Salicylic Acid-sulfur High 07/06/2017    Anaphylaxis Sulfur  04/15/2016    Unknown (comments) Current Immunizations  Never Reviewed No immunizations on file. Not reviewed this visit You Were Diagnosed With   
  
 Codes Comments Migraine without aura and without status migrainosus, not intractable    -  Primary ICD-10-CM: G43.009 ICD-9-CM: 346.10 Vitals BP Pulse Height(growth percentile) Weight(growth percentile) LMP SpO2  
 110/78 (BP 1 Location: Left arm, BP Patient Position: Sitting) (!) 126 5' 1\" (1.549 m) 156 lb (70.8 kg) 05/23/2018 (Approximate) 99% BMI OB Status Smoking Status 29.48 kg/m2 Having regular periods Never Smoker BMI and BSA Data Body Mass Index Body Surface Area  
 29.48 kg/m 2 1.75 m 2 Preferred Pharmacy Pharmacy Name Phone [facility] 1 [address] AT [address] [facility] [phone] Your Updated Medication List  
  
   
This list is accurate as of 6/11/18  9:26 AM.  Always use your most recent med list.  
  
  
  
  
 ALIGN 4 mg Cap Generic drug:  Bifidobacterium Infantis Take  by mouth. azelaic acid 15 % topical gel Commonly known as:  Wiziva Apply  to affected area two (2) times a day. COLACE PO Take  by mouth.  
  
 cyanocobalamin (vitamin B-12) 5,000 mcg Subl Commonly known as:  VITAMIN B-12  
5,000 mcg by SubLINGual route daily. Diclofenac Potassium 50 mg Pwpk Commonly known as:  CAMBIA  
1 packet by mouth at headache onset. May repeat again in 2 hours X 1 dose. Max 2 doses in 24 hours DULoxetine 60 mg capsule Commonly known as:  CYMBALTA Take 1 Cap by mouth daily. MICROGESTIN FE 1.5/30 (28) 1.5 mg-30 mcg (21)/75 mg (7) Tab Generic drug:  norethindrone-ethinyl estradiol-iron * onabotulinumtoxinA 200 unit injection Commonly known as:  BOTOX Inject 200 units IM to head and neck in 31 FDA approved sites every 3 months. * onabotulinumtoxinA 200 unit injection Commonly known as:  BOTOX Inject 200 units IM to head and neck in 31 FDA approved sites every 3 months. oxymetazoline 0.05 % nasal spray Commonly known as:  AFRIN  
2 Sprays two (2) times a day. QNASL 80 mcg/actuation Hfaa Generic drug:  beclomethasone dipropionate ZYRTEC PO Take  by mouth. * Notice: This list has 2 medication(s) that are the same as other medications prescribed for you. Read the directions carefully, and ask your doctor or other care provider to review them with you. We Performed the Following [address] [id number] CPT(R)] Follow-up Instructions Return in about 6 weeks (around 7/23/2018). Introducing \[facility]\"" & HEALTH SERVICES! Dear Elena Zaragoza: Thank you for requesting a Endorse account. Our records indicate that you already have an active Endorse account. You can access your account anytime at [email or web address] Did you know that you can access your hospital and ER discharge instructions at any time in Endorse?   You can also review all of your test results from your hospital stay or ER visit. Additional Information If you have questions, please visit the Frequently Asked Questions section of the [employer] website at [email or web address] Remember, [employer] is NOT to be used for urgent needs. For medical emergencies, dial 911. Now available from your iPhone and Android! Please provide this summary of care documentation to your next provider. Your primary care clinician is listed as Meghan Lee. If you have any questions after today's visit, please call [phone].

## 2018-07-27 ENCOUNTER — TELEPHONE (OUTPATIENT)
Dept: NEUROLOGY | Age: 28
End: 2018-07-27

## 2018-07-27 NOTE — TELEPHONE ENCOUNTER
Attempted to contact patient. Left message on voicemail for return call. mychart message sent to patient.

## 2018-07-27 NOTE — TELEPHONE ENCOUNTER
----- Message from [facility] sent at 7/27/2018  9:17 AM EDT -----  Regarding: JOE Ortega/ telephone  The pt just missed a call about her appt on Monday, and called to see what needed to be discussed. Best contact number is [phone] and leave a message.

## 2018-07-30 ENCOUNTER — OFFICE VISIT (OUTPATIENT)
Dept: NEUROLOGY | Age: 28
End: 2018-07-30

## 2018-07-30 VITALS
BODY MASS INDEX: 29.45 KG/M2 | DIASTOLIC BLOOD PRESSURE: 86 MMHG | WEIGHT: 156 LBS | SYSTOLIC BLOOD PRESSURE: 128 MMHG | HEIGHT: 61 IN

## 2018-07-30 DIAGNOSIS — G43.009 MIGRAINE WITHOUT AURA AND WITHOUT STATUS MIGRAINOSUS, NOT INTRACTABLE: Primary | ICD-10-CM

## 2018-07-30 NOTE — MR AVS SNAPSHOT
[address] [place] [address] [address] [place] [address] [phone] Patient: Christian Gardner MRN: [id number] EKI:[birth year] Visit Information Date & Time Provider Department Dept. Phone Encounter #  
 7/30/2018 12:45 PM JOE Meza Neurology [facility] [phone] [id number] Follow-up Instructions Return after botox. Upcoming Health Maintenance Date Due DTaP/Tdap/Td series (1 - Tdap) 2/12/2009 PAP AKA CERVICAL CYTOLOGY 2/12/2009 Influenza Age 5 to Adult 8/1/2018 Allergies as of 7/30/2018  Review Complete On: 7/30/2018 By: Shiela Samayoa Severity Noted Reaction Type Reactions Salicylic Acid-sulfur High 07/06/2017    Anaphylaxis Sulfur  04/15/2016    Unknown (comments) Current Immunizations  Never Reviewed No immunizations on file. Not reviewed this visit You Were Diagnosed With   
  
 Codes Comments Migraine without aura and without status migrainosus, not intractable    -  Primary ICD-10-CM: G43.009 ICD-9-CM: 346.10 Vitals BP Height(growth percentile) Weight(growth percentile) BMI OB Status Smoking Status 128/86 5' 1\" (1.549 m) 156 lb (70.8 kg) 29.48 kg/m2 Having regular periods Never Smoker BMI and BSA Data Body Mass Index Body Surface Area  
 29.48 kg/m 2 1.75 m 2 Preferred Pharmacy Pharmacy Name Phone [facility] [address] [place] [address] [address]  [facility] [phone] Your Updated Medication List  
  
   
This list is accurate as of 7/30/18  1:04 PM.  Always use your most recent med list.  
  
  
  
  
 ALIGN 4 mg Cap Generic drug:  Bifidobacterium Infantis Take  by mouth.  
  
 cyanocobalamin (vitamin B-12) 5,000 mcg Subl Commonly known as:  VITAMIN B-12  
5,000 mcg by SubLINGual route daily. Diclofenac Potassium 50 mg Pwpk Commonly known as:  CAMBIA  
1 packet by mouth at headache onset. May repeat again in 2 hours X 1 dose. Max 2 doses in 24 hours DULoxetine 60 mg capsule Commonly known as:  CYMBALTA Take 1 Cap by mouth daily. MICROGESTIN FE 1.5/30 (28) 1.5 mg-30 mcg (21)/75 mg (7) Tab Generic drug:  norethindrone-ethinyl estradiol-iron  
  
 onabotulinumtoxinA 200 unit injection Commonly known as:  BOTOX Inject 200 units IM to head and neck in 31 FDA approved sites every 3 months. oxymetazoline 0.05 % nasal spray Commonly known as:  AFRIN  
2 Sprays two (2) times a day. QNASL 80 mcg/actuation Hfaa Generic drug:  beclomethasone dipropionate ZYRTEC PO Take  by mouth. Follow-up Instructions Return after botox. Patient Instructions PRESCRIPTION REFILL POLICY [facility] Statement to Patients April 1, 2014 In an effort to ensure the large volume of patient prescription refills is processed in the most efficient and expeditious manner, we are asking our patients to assist us by calling your Pharmacy for all prescription refills, this will include also your  Mail Order Pharmacy. The pharmacy will contact our office electronically to continue the refill process. Please do not wait until the last minute to call your pharmacy. We need at least 48 hours (2days) to fill prescriptions. We also encourage you to call your pharmacy before going to  your prescription to make sure it is ready. With regard to controlled substance prescription refill requests (narcotic refills) that need to be picked up at our office, we ask your cooperation by providing us with at least 72 hours (3days) notice that you will need a refill. We will not refill narcotic prescription refill requests after 4:00pm on any weekday, Monday through Thursday, or after 2:00pm on Fridays, or on the weekends. We encourage everyone to explore another way of getting your prescription refill request processed using Sudiksha, our patient web portal through our electronic medical record system. Sudiksha is an efficient and effective way to communicate your medication request directly to the office and  downloadable as an nitza on your smart phone . [employer]ha also features a review functionality that allows you to view your medication list as well as leave messages for your physician. Are you ready to get connected? If so please review the attatched instructions or speak to any of our staff to get you set up right away! Thank you so much for your cooperation. Should you have any questions please contact our Practice Administrator. The Physicians and Staff,  [facility] Introducing [facility]! Dear Colin Page: Thank you for requesting a Sudiksha account. Our records indicate that you already have an active Sudiksha account. You can access your account anytime at [email or web address] Did you know that you can access your hospital and ER discharge instructions at any time in Sudiksha? You can also review all of your test results from your hospital stay or ER visit. Additional Information If you have questions, please visit the Frequently Asked Questions section of the Sudiksha website at [email or web address] Remember, Sudiksha is NOT to be used for urgent needs. For medical emergencies, dial 911. Now available from your iPhone and Android! Please provide this summary of care documentation to your next provider. Your primary care clinician is listed as Herminio Rivas. If you have any questions after today's visit, please call [phone].

## 2018-07-30 NOTE — PROGRESS NOTES
Patient is here for follow up after botox. She had a terrible headache about 2 weeks ago that she had to go home from work. This was during her menstrual cycle. Cambia seemed to make it worse.

## 2018-07-30 NOTE — PATIENT INSTRUCTIONS
[facility]   Statement to Patients  April 1, 2014      In an effort to ensure the large volume of patient prescription refills is processed in the most efficient and expeditious manner, we are asking our patients to assist us by calling your Pharmacy for all prescription refills, this will include also your  Mail Order Pharmacy. The pharmacy will contact our office electronically to continue the refill process. Please do not wait until the last minute to call your pharmacy. We need at least 48 hours (2days) to fill prescriptions. We also encourage you to call your pharmacy before going to  your prescription to make sure it is ready. With regard to controlled substance prescription refill requests (narcotic refills) that need to be picked up at our office, we ask your cooperation by providing us with at least 72 hours (3days) notice that you will need a refill. We will not refill narcotic prescription refill requests after 4:00pm on any weekday, Monday through Thursday, or after 2:00pm on Fridays, or on the weekends. We encourage everyone to explore another way of getting your prescription refill request processed using [employer], our patient web portal through our electronic medical record system. [employer] is an efficient and effective way to communicate your medication request directly to the office and  downloadable as an nitza on your smart phone . [employer] also features a review functionality that allows you to view your medication list as well as leave messages for your physician. Are you ready to get connected? If so please review the attatched instructions or speak to any of our staff to get you set up right away! Thank you so much for your cooperation. Should you have any questions please contact our Practice Administrator.     The Physicians and Staff,  [facility]

## 2018-07-30 NOTE — PROGRESS NOTES
Federico Mark is a 25 y.o. female who presents with the following  Chief Complaint   Patient presents with    Follow-up     after botox       HPI Patient comes in for a follow up for botox. Has had a few headaches here and there and 1 bad migraine while at school it escalated and she had to go home and rest. Woke up about 1-2 hours later and was down significantly to a tolerable level but not gone completely. Was hanging around for about 3-4 days with her cycle then went away. No other triggers with that one noticed. Cambia 1 packet did not really help this migraine but usually it does work well. Nothing else changed with this migraine per her report. She has no trouble with balance, speech, coordination. 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 Cymbalta through PCP is working. No other changes. Allergies   Allergen Reactions    Salicylic Acid-Sulfur Anaphylaxis    Sulfur Unknown (comments)       Current Outpatient Prescriptions   Medication Sig    Bifidobacterium Infantis (ALIGN) 4 mg cap Take  by mouth.  DULoxetine (CYMBALTA) 60 mg capsule Take 1 Cap by mouth daily.  oxymetazoline (AFRIN) 0.05 % nasal spray 2 Sprays two (2) times a day.  onabotulinumtoxinA (BOTOX) 200 unit injection Inject 200 units IM to head and neck in 31 FDA approved sites every 3 months.  cyanocobalamin, vitamin B-12, (VITAMIN B-12) 5,000 mcg subl 5,000 mcg by SubLINGual route daily.  Diclofenac Potassium (CAMBIA) 50 mg pwpk 1 packet by mouth at headache onset. May repeat again in 2 hours X 1 dose. Max 2 doses in 24 hours    MICROGESTIN FE 1.5/30, 28, 1.5 mg-30 mcg (21)/75 mg (7) tab     QNASL 80 mcg/actuation HFAA     CETIRIZINE HCL (ZYRTEC PO) Take  by mouth. No current facility-administered medications for this visit.         History   Smoking Status    Never Smoker   Smokeless Tobacco    Never Used       Past Medical History:   Diagnosis Date    Acne     Allergic rhinitis 08/24/2016    shots Dr. Hartley Person    B12 deficiency 2/9/2017    Calculus of kidney     Headache     Migraine without aura and without status migrainosus, not intractable 01/19/2017    botox injections. Dr. Julio Mcclure    Nephrolithiasis 8/24/2016       Past Surgical History:   Procedure Laterality Date    HX LITHOTRIPSY         Family History   Problem Relation Age of Onset    Cancer Father      Prostate    Headache Father     Heart Disease Father     Diabetes Father        Social History     Social History    Marital status: SINGLE     Spouse name: N/A    Number of children: N/A    Years of education: N/A     Social History Main Topics    Smoking status: Never Smoker    Smokeless tobacco: Never Used    Alcohol use Yes    Drug use: No    Sexual activity: Not Currently     Other Topics Concern    None     Social History Narrative       Review of Systems   Eyes: Positive for blurred vision and photophobia. Negative for double vision. Respiratory: Negative for shortness of breath and wheezing. Neurological: Positive for headaches. Negative for dizziness, tingling, tremors and loss of consciousness. Remainder of comprehensive review is negative. Physical Exam :    Visit Vitals    /86    Ht 5' 1\" (1.549 m)    Wt 70.8 kg (156 lb)    BMI 29.48 kg/m2       General: Well defined, nourished, and groomed individual in no acute distress.    Neck: Supple, nontender, no bruits, no pain with resistance to active range of motion.    Heart: Regular rate and rhythm, no murmurs, rub, or gallop. Normal S1S2. Lungs: Clear to auscultation bilaterally with equal chest expansion, no cough, no wheeze  Musculoskeletal: Extremities revealed no edema and had full range of motion of joints.    Psych: Good mood and bright affect    NEUROLOGICAL EXAMINATION:    Mental Status: Alert and oriented to person, place, and time    Cranial Nerves:    II, III, IV, VI: Visual acuity grossly intact.  Visual fields are normal.    Pupils are equal, round, and reactive to light and accommodation.    Extra-ocular movements are full and fluid. Fundoscopic exam was benign, no ptosis or nystagmus.    V-XII: Hearing is grossly intact. Facial features are symmetric, with normal sensation and strength. The palate rises symmetrically and the tongue protrudes midline. Sternocleidomastoids 5/5. Motor Examination: Normal tone, bulk, and strength, 5/5 muscle strength throughout. Coordination: Finger to nose was normal. No resting or intention tremor    Gait and Station: Steady while walking. Normal arm swing. No pronator drift. No muscle wasting or fasiculations noted. Reflexes: DTRs 2+ throughout. Results for orders placed or performed in visit on 03/29/18   VITAMIN B12   Result Value Ref Range    Vitamin B12 >2000 (H) 232 - 0230 pg/mL   METABOLIC PANEL, BASIC   Result Value Ref Range    Glucose 74 65 - 99 mg/dL    BUN 12 6 - 20 mg/dL    Creatinine 0.66 0.57 - 1.00 mg/dL    GFR est non- >59 mL/min/1.73    GFR est  >59 mL/min/1.73    BUN/Creatinine ratio 18 9 - 23    Sodium 141 134 - 144 mmol/L    Potassium 4.1 3.5 - 5.2 mmol/L    Chloride 99 96 - 106 mmol/L    CO2 25 18 - 29 mmol/L    Calcium 8.9 8.7 - 10.2 mg/dL   CBC W/O DIFF   Result Value Ref Range    WBC 12.6 (H) 3.4 - 10.8 x10E3/uL    RBC 4.56 3.77 - 5.28 x10E6/uL    HGB 13.1 11.1 - 15.9 g/dL    HCT 38.9 34.0 - 46.6 %    MCV 85 79 - 97 fL    MCH 28.7 26.6 - 33.0 pg    MCHC 33.7 31.5 - 35.7 g/dL    RDW 13.8 12.3 - 15.4 %    PLATELET 308 114 - 956 x10E3/uL       No orders of the defined types were placed in this encounter. 1. Migraine without aura and without status migrainosus, not intractable        Follow-up Disposition:  Return after botox. Migraines doing well with botox only 1 since last treatment. Cut down significantly as she was having over 15 migraines a month with daily headaches.  Not really having a lot of generalized headaches either. 1 migraine most likely due to hormones and cycle. Continue to follow this and use Cambia PRN. Keep Botox as FDA approved treatment for migraines as this has been working well. Call with any changes.          This note will not be viewable in [employer]

## 2018-08-06 ENCOUNTER — TELEPHONE (OUTPATIENT)
Dept: NEUROLOGY | Age: 28
End: 2018-08-06

## 2018-08-06 NOTE — TELEPHONE ENCOUNTER
Due 9/3/8 with Dr. Misael Weinstein  Auth# [id number]  Effective 10/13/17-10/16/18    4/18/18- Botox injection 82853   Auth# [id number]  Effective 3/1/18-3/1/19/tx 4/glk    Called [facility]; spoke with [employer]. botox scheduled for delivery Thursday 8/9/18. Patient botox appt scheduled for Wednesday, September 05, 2018 01:20 PM   [employer] message sent to patient.

## 2018-08-09 ENCOUNTER — TELEPHONE (OUTPATIENT)
Dept: NEUROLOGY | Age: 28
End: 2018-08-09

## 2018-09-05 ENCOUNTER — OFFICE VISIT (OUTPATIENT)
Dept: NEUROLOGY | Age: 28
End: 2018-09-05

## 2018-09-05 VITALS
HEIGHT: 61 IN | BODY MASS INDEX: 29.45 KG/M2 | SYSTOLIC BLOOD PRESSURE: 112 MMHG | WEIGHT: 156 LBS | DIASTOLIC BLOOD PRESSURE: 80 MMHG

## 2018-09-05 DIAGNOSIS — G43.009 MIGRAINE WITHOUT AURA AND WITHOUT STATUS MIGRAINOSUS, NOT INTRACTABLE: Primary | ICD-10-CM

## 2018-09-05 NOTE — MR AVS SNAPSHOT
[address] [facility] [address] [facility] [address] [facility] [id number] [phone] Patient: Alison Jones MRN: [id number] Visit Information Date & Time Provider Department Dept. Phone Encounter #  
 9/5/2018  3:20 PM Shiv Mcneil MD [facility] [phone] [id number] Follow-up Instructions Return in about 3 months (around 12/5/2018). Upcoming Health Maintenance Date Due DTaP/Tdap/Td series (1 - Tdap) 2/12/2009 PAP AKA CERVICAL CYTOLOGY 2/12/2009 Influenza Age 5 to Adult 8/1/2018 Allergies as of 9/5/2018  Review Complete On: 9/5/2018 By: Mann Prabhakar Severity Noted Reaction Type Reactions Salicylic Acid-sulfur High 07/06/2017    Anaphylaxis Sulfur  04/15/2016    Unknown (comments) Current Immunizations  Never Reviewed No immunizations on file. Not reviewed this visit You Were Diagnosed With   
  
 Codes Comments Migraine without aura and without status migrainosus, not intractable    -  Primary ICD-10-CM: G43.009 ICD-9-CM: 346.10 Vitals BP Height(growth percentile) Weight(growth percentile) BMI OB Status Smoking Status 112/80 5' 1\" (1.549 m) 156 lb (70.8 kg) 29.48 kg/m2 Having regular periods Never Smoker BMI and BSA Data Body Mass Index Body Surface Area  
 29.48 kg/m 2 1.75 m 2 Preferred Pharmacy Pharmacy Name Phone [facility] [address] AT [address] [facility] [phone] Your Updated Medication List  
  
   
This list is accurate as of 9/5/18  3:53 PM.  Always use your most recent med list.  
  
  
  
  
 ALIGN 4 mg Cap Generic drug:  Bifidobacterium Infantis Take  by mouth.  
  
 cyanocobalamin (vitamin B-12) 5,000 mcg Subl Commonly known as:  VITAMIN B-12  
5,000 mcg by SubLINGual route daily. Diclofenac Potassium 50 mg Pwpk Commonly known as:  CAMBIA  
1 packet by mouth at headache onset. May repeat again in 2 hours X 1 dose. Max 2 doses in 24 hours DULoxetine 60 mg capsule Commonly known as:  CYMBALTA Take 1 Cap by mouth daily. MICROGESTIN FE 1.5/30 (28) 1.5 mg-30 mcg (21)/75 mg (7) Tab Generic drug:  norethindrone-ethinyl estradiol-iron  
  
 onabotulinumtoxinA 200 unit injection Commonly known as:  BOTOX Inject 200 units IM to head and neck in 31 FDA approved sites every 3 months. oxymetazoline 0.05 % nasal spray Commonly known as:  AFRIN  
2 Sprays two (2) times a day. QNASL 80 mcg/actuation Hfaa Generic drug:  beclomethasone dipropionate ZYRTEC PO Take  by mouth. We Performed the Following [address] [id number] CPT(R)] Follow-up Instructions Return in about 3 months (around 12/5/2018). Patient Instructions PRESCRIPTION REFILL POLICY [facility] Statement to Patients April 1, 2014 In an effort to ensure the large volume of patient prescription refills is processed in the most efficient and expeditious manner, we are asking our patients to assist us by calling your Pharmacy for all prescription refills, this will include also your  Mail Order Pharmacy. The pharmacy will contact our office electronically to continue the refill process. Please do not wait until the last minute to call your pharmacy. We need at least 48 hours (2days) to fill prescriptions. We also encourage you to call your pharmacy before going to  your prescription to make sure it is ready. With regard to controlled substance prescription refill requests (narcotic refills) that need to be picked up at our office, we ask your cooperation by providing us with at least 72 hours (3days) notice that you will need a refill.  
 
We will not refill narcotic prescription refill requests after 4:00pm on any weekday, Monday through Thursday, or after 2:00pm on Fridays, or on the weekends. We encourage everyone to explore another way of getting your prescription refill request processed using [employer], our patient web portal through our electronic medical record system. [employer] is an efficient and effective way to communicate your medication request directly to the office and  downloadable as an nitza on your smart phone . [employer] also features a review functionality that allows you to view your medication list as well as leave messages for your physician. Are you ready to get connected? If so please review the attatched instructions or speak to any of our staff to get you set up right away! Thank you so much for your cooperation. Should you have any questions please contact our Practice Administrator. The Physicians and Staff,  [facility] Introducing [facility]! Dear Jethro Bernardo: Thank you for requesting a [employer] account. Our records indicate that you already have an active [employer] account. You can access your account anytime at [email or web address] Did you know that you can access your hospital and ER discharge instructions at any time in [employer]? You can also review all of your test results from your hospital stay or ER visit. Additional Information If you have questions, please visit the Frequently Asked Questions section of the [employer] website at [email or web address] Remember, [employer] is NOT to be used for urgent needs. For medical emergencies, dial 911. Now available from your iPhone and Android! Please provide this summary of care documentation to your next provider. Your primary care clinician is listed as Lyndsey Valles. If you have any questions after today's visit, please call [phone].

## 2018-09-05 NOTE — PATIENT INSTRUCTIONS
10 [facility]   Statement to Patients  April 1, 2014      In an effort to ensure the large volume of patient prescription refills is processed in the most efficient and expeditious manner, we are asking our patients to assist us by calling your Pharmacy for all prescription refills, this will include also your  Mail Order Pharmacy. The pharmacy will contact our office electronically to continue the refill process. Please do not wait until the last minute to call your pharmacy. We need at least 48 hours (2days) to fill prescriptions. We also encourage you to call your pharmacy before going to  your prescription to make sure it is ready. With regard to controlled substance prescription refill requests (narcotic refills) that need to be picked up at our office, we ask your cooperation by providing us with at least 72 hours (3days) notice that you will need a refill. We will not refill narcotic prescription refill requests after 4:00pm on any weekday, Monday through Thursday, or after 2:00pm on Fridays, or on the weekends. We encourage everyone to explore another way of getting your prescription refill request processed using [email or web address], our patient web portal through our electronic medical record system. [email or web address] is an efficient and effective way to communicate your medication request directly to the office and  downloadable as an nitza on your smart phone . [email or web address] also features a review functionality that allows you to view your medication list as well as leave messages for your physician. Are you ready to get connected? If so please review the attatched instructions or speak to any of our staff to get you set up right away! Thank you so much for your cooperation. Should you have any questions please contact our Practice Administrator.     The Physicians and Staff,  [facility]

## 2018-09-05 NOTE — PROCEDURES
[facility] [place]  OFFICE PROCEDURE PROGRESS NOTE        Chart reviewed for the following:   Chelly Campbell MD, have reviewed the History, Physical and updated the Allergic reactions for [address] performed immediately prior to start of procedure:   Chelly Campbell MD, have performed the following reviews on Christian Mutter prior to the start of the procedure:            * Patient was identified by name and date of birth   * Agreement on procedure being performed was verified  * Risks and Benefits explained to the patient  * Procedure site verified and marked as necessary  * Patient was positioned for comfort  * Consent was signed and verified     Time: 9440  Date of procedure: 9/5/2018  Procedure performed by:  Jonatan Hill MD  Provider assisted by: None  Patient assisted by: self  How tolerated by patient: tolerated the procedure well with no complications  Comments: None    Botox Injection Note    Indication:   Patient has chronic migraine and presents for repeat Botox Injection as it has reduced overall headahce frequency including migraines (see clinic notes for details). Procedure:    Botox concentration: 200 units in 4 ml of preservative-free normal saline.   31 sites injections, distribution as follow         Units/site Sites Sides subtotal  Procerus     5  1 1 5        5   1 2 10  Frontalis     5  2 2 20  Temporalis    5  4 2 40  Occipitalis    5  3 2 30   Upper cervical paraspinalis  5  2 2 20   Trapezius    5  3 2 30    200 units Botox were reconstituted, 155 units injected as above and the remainder was unavoidably wasted    Patient tolerated procedure well.      _____________________________  Jonatan Hill M.D.

## 2018-11-04 DIAGNOSIS — G43.009 MIGRAINE WITHOUT AURA AND WITHOUT STATUS MIGRAINOSUS, NOT INTRACTABLE: ICD-10-CM

## 2019-01-05 DIAGNOSIS — G43.009 MIGRAINE WITHOUT AURA AND WITHOUT STATUS MIGRAINOSUS, NOT INTRACTABLE: ICD-10-CM

## 2019-01-21 ENCOUNTER — OFFICE VISIT (OUTPATIENT)
Dept: NEUROLOGY | Age: 29
End: 2019-01-21

## 2019-01-21 VITALS
WEIGHT: 156 LBS | DIASTOLIC BLOOD PRESSURE: 88 MMHG | BODY MASS INDEX: 29.45 KG/M2 | SYSTOLIC BLOOD PRESSURE: 120 MMHG | HEIGHT: 61 IN

## 2019-01-21 DIAGNOSIS — G43.009 MIGRAINE WITHOUT AURA AND WITHOUT STATUS MIGRAINOSUS, NOT INTRACTABLE: ICD-10-CM

## 2019-01-21 NOTE — PROGRESS NOTES
Irene Engel is a 25 y.o. female who presents with the following  Chief Complaint   Patient presents with    Migraine       HPI  Patient comes in for a follow up for botox. Has had significant decrease in daily headaches to about 10-15 a month with botox but noticing they are coming back more frequent now. She is almost at 5 months botox due and when she is supposed to be getting this every 3. Her insurance continues to resist with getting things on time. She has not noticed any new changes. Did have a headache in the base of the skull and neck she thinks from sleeping bad. Also had one localized over the right eye which was new but again she thinks from posturing  No other triggers with that one noticed. Uses Cambia for rescue but has not really needed this that much. Nothing else changed with this migraine per her report. She has no trouble with balance, speech, coordination. work is going well. Allergies   Allergen Reactions    Salicylic Acid-Sulfur Anaphylaxis    Sulfur Unknown (comments)       Current Outpatient Medications   Medication Sig    Diclofenac Potassium (CAMBIA) 50 mg pwpk MIX AND DRINK 1 PACKET BY MOUTH AT ONSET OF HEADACHE. MAY REPEAT AGAIN IN 2 HOURS FOR 1 DOSE. MAX 2 DOSES IN 24 HOURS    erenumab-aooe (AIMOVIG AUTOINJECTOR) 70 mg/mL atIn 1 Syringe by SubCUTAneous route every twenty-eight (28) days.  onabotulinumtoxinA (BOTOX) 200 unit injection INJECT 155 UNITS TO 31 FDA APPROVED SITES TO FACE AND NECK FOR MIGRAINES. DISCARD UNUSED PORTION    cyanocobalamin, vitamin B-12, 5,000 mcg TbDi DISSOLVE 1 TABLET BY MOUTH ONCE DAILY    DULoxetine (CYMBALTA) 60 mg capsule TAKE 1 CAPSULE BY MOUTH DAILY    Bifidobacterium Infantis (ALIGN) 4 mg cap Take  by mouth.  oxymetazoline (AFRIN) 0.05 % nasal spray 2 Sprays two (2) times a day.  MICROGESTIN FE 1.5/30, 28, 1.5 mg-30 mcg (21)/75 mg (7) tab     CETIRIZINE HCL (ZYRTEC PO) Take  by mouth.      No current facility-administered medications for this visit. Social History     Tobacco Use   Smoking Status Never Smoker   Smokeless Tobacco Never Used       Past Medical History:   Diagnosis Date    Acne     Allergic rhinitis 08/24/2016    shots Dr. Contreras [place]    Arrhythmia     B12 deficiency 2/9/2017    Calculus of kidney     Headache     Migraine without aura and without status migrainosus, not intractable 01/19/2017    botox injections. Dr. Phil Lacey    Nephrolithiasis 8/24/2016       Past Surgical History:   Procedure Laterality Date    HX LITHOTRIPSY         Family History   Problem Relation Age of Onset    Cancer Father         Prostate    Headache Father     Heart Disease Father     Diabetes Father        Social History     Socioeconomic History    Marital status: SINGLE     Spouse name: Not on file    Number of children: Not on file    Years of education: Not on file    Highest education level: Not on file   Tobacco Use    Smoking status: Never Smoker    Smokeless tobacco: Never Used   Substance and Sexual Activity    Alcohol use: Yes    Drug use: No    Sexual activity: Not Currently       Review of Systems   Eyes: Positive for blurred vision, double vision and photophobia. Respiratory: Negative for shortness of breath and wheezing. Cardiovascular: Negative for chest pain and palpitations. Gastrointestinal: Negative for nausea and vomiting. Neurological: Positive for headaches. Negative for dizziness, tingling, tremors, seizures and loss of consciousness. Remainder of comprehensive review is negative. Physical Exam :    Visit Vitals  /88   Ht 5' 1\" (1.549 m)   Wt 70.8 kg (156 lb)   BMI 29.48 kg/m²       General: Well defined, nourished, and groomed individual in no acute distress.    Neck: Supple, nontender, no bruits, no pain with resistance to active range of motion.    Heart: Regular rate and rhythm, no murmurs, rub, or gallop. Normal S1S2.   Lungs: Clear to auscultation bilaterally with equal chest expansion, no cough, no wheeze  Musculoskeletal: Extremities revealed no edema and had full range of motion of joints.    Psych: Good mood and bright affect    NEUROLOGICAL EXAMINATION:    Mental Status: Alert and oriented to person, place, and time    Cranial Nerves:    II, III, IV, VI: Visual acuity grossly intact. Visual fields are normal.    Pupils are equal, round, and reactive to light and accommodation.    Extra-ocular movements are full and fluid. Fundoscopic exam was benign, no ptosis or nystagmus.    V-XII: Hearing is grossly intact. Facial features are symmetric, with normal sensation and strength. The palate rises symmetrically and the tongue protrudes midline. Sternocleidomastoids 5/5. Motor Examination: Normal tone, bulk, and strength, 5/5 muscle strength throughout. Coordination: Finger to nose was normal. No resting or intention tremor    Gait and Station: Steady while walking. Normal arm swing. No pronator drift. No muscle wasting or fasiculations noted. Reflexes: DTRs 2+ throughout.           Results for orders placed or performed in visit on 03/29/18   VITAMIN B12   Result Value Ref Range    Vitamin B12 >2000 (H) 232 - 4762 pg/mL   METABOLIC PANEL, BASIC   Result Value Ref Range    Glucose 74 65 - 99 mg/dL    BUN 12 6 - 20 mg/dL    Creatinine 0.66 0.57 - 1.00 mg/dL    GFR est non- >59 mL/min/1.73    GFR est  >59 mL/min/1.73    BUN/Creatinine ratio 18 9 - 23    Sodium 141 134 - 144 mmol/L    Potassium 4.1 3.5 - 5.2 mmol/L    Chloride 99 96 - 106 mmol/L    CO2 25 18 - 29 mmol/L    Calcium 8.9 8.7 - 10.2 mg/dL   CBC W/O DIFF   Result Value Ref Range    WBC 12.6 (H) 3.4 - 10.8 x10E3/uL    RBC 4.56 3.77 - 5.28 x10E6/uL    HGB 13.1 11.1 - 15.9 g/dL    HCT 38.9 34.0 - 46.6 %    MCV 85 79 - 97 fL    MCH 28.7 26.6 - 33.0 pg    MCHC 33.7 31.5 - 35.7 g/dL    RDW 13.8 12.3 - 15.4 %    PLATELET 128 480 - 956 x10E3/uL       Orders Placed This Encounter    Diclofenac Potassium (CAMBIA) 50 mg pwpk     Sig: MIX AND DRINK 1 PACKET BY MOUTH AT ONSET OF HEADACHE. MAY REPEAT AGAIN IN 2 HOURS FOR 1 DOSE. MAX 2 DOSES IN 24 HOURS     Dispense:  9 Packet     Refill:  5    erenumab-aooe (AIMOVIG AUTOINJECTOR) 70 mg/mL atIn     Si Syringe by SubCUTAneous route every twenty-eight (28) days. Dispense:  1 Syringe     Refill:  5       1. Migraine without aura and without status migrainosus, not intractable        Follow-up Disposition:  Return in about 10 weeks (around 2019). Botox is overdue and needs approval. We will get this re-approved for treatment because it has really cut the HA down to half. We will try Aimovig 70 mg for her prevention treatment as the CRGP inhibiter and hopefully can get off and keep at this for migraine prevention. Tried and failed AED, BP, SSRI and multiple others. We discussed this and showed her how to use this with side effects. She has no questions. Keep Cambia for rescue as this does work well for her. Keep active. Call with any changes.          This note will not be viewable in [facility]

## 2019-01-21 NOTE — PATIENT INSTRUCTIONS
[facility]   Statement to Patients  April 1, 2014      In an effort to ensure the large volume of patient prescription refills is processed in the most efficient and expeditious manner, we are asking our patients to assist us by calling your Pharmacy for all prescription refills, this will include also your  Mail Order Pharmacy. The pharmacy will contact our office electronically to continue the refill process. Please do not wait until the last minute to call your pharmacy. We need at least 48 hours (2days) to fill prescriptions. We also encourage you to call your pharmacy before going to  your prescription to make sure it is ready. With regard to controlled substance prescription refill requests (narcotic refills) that need to be picked up at our office, we ask your cooperation by providing us with at least 72 hours (3days) notice that you will need a refill. We will not refill narcotic prescription refill requests after 4:00pm on any weekday, Monday through Thursday, or after 2:00pm on Fridays, or on the weekends. We encourage everyone to explore another way of getting your prescription refill request processed using [employer], our patient web portal through our electronic medical record system. [employer] is an efficient and effective way to communicate your medication request directly to the office and  downloadable as an nitza on your smart phone . [employer] also features a review functionality that allows you to view your medication list as well as leave messages for your physician. Are you ready to get connected? If so please review the attatched instructions or speak to any of our staff to get you set up right away! Thank you so much for your cooperation. Should you have any questions please contact our Practice Administrator.     The Physicians and Staff,  [facility]

## 2019-01-24 ENCOUNTER — TELEPHONE (OUTPATIENT)
Dept: NEUROLOGY | Age: 29
End: 2019-01-24

## 2019-01-24 NOTE — TELEPHONE ENCOUNTER
Franchesca Talamantes approved   Until April 24/19  Authorization number: [id number]  ___________________________________  Lakeisha Kohler approved until 4/24/19 santos sent to patient

## 2019-01-24 NOTE — TELEPHONE ENCOUNTER
Regarding: FW: RE: RE: RE: Prescription Question  Contact: [phone]      ----- Message -----  From: Sirisha Romo  Sent: 2019   2:49 PM  To: Ulises Tripp  Subject: RE: RE: RE: RE: Prescription Question            ----- Message from [facility] [address], Generic sent at 2019  2:49 PM EST -----    Thuy Pagan,  I just got off of the phone with my insurance provider and they asked that your office could please call this number and give prior authorization for the [address] They said that if you call any number other than this number it'll take 20 business days. Not sure why, but the number is: [phone]   Thank you,  Patricia Land     ----- Message -----  From: Travis Myers  Sent: 19, 8:46 AM  To: Sirisha Romo  Subject: RE: RE: RE: Prescription Question    I have scheduled you for 2019 09:00 AM with Yolande Turner. If this time doesn't work for you please let us know.       ----- Message -----     From: Lauren Dominguez: 2019  4:57 PM EST       To: Patient Medical Advice Request Mailing List  Subject: RE: RE: RE: Prescription Question    I am available any time after 3:00 next week Tuesday or Wednesday  or any time Monday. If none of those are available and I had to, I can take time off on Tuesday morning. Thanks again!   ----- Message -----  From: Travis Myers  Sent: 19, 10:38 AM  To: Sirisha Romo  Subject: RE: RE: Prescription Question    Yes I remember. Your prior authorization for the procedure is still good until march. However the prior authorization for the medication . I apologize for any inconvenience. Please let me know when you are available to come in and we can fit you in.  The earliest would be tomorrow at 8:30am. The appointment would just be to see how you are doing since the last botox injection to show that it is reducing the number of headaches.       ----- Message -----     From: Sirisha Romo     Sent: 2019  5:38 PM EST       To: Patient Medical Advice Request Mailing List  Subject: RE: RE: Prescription Question    Oh okay, last time we had to reschedule Guero Minor said it was okay if I didn't come until after Botox next time, do you remember?     ----- Message -----  From: Lou Bishop  Sent: 1/10/19, 1:34 PM  To: Evan Kc  Subject: RE: Prescription Question    Your botox medication does require a new prior authorization. We have not seen you since your last botox and we need you to come in for an office visit before we can sent the PA.     ----- Message -----     From: Evan Kc     Sent: 1/10/2019 12:58 PM EST       To: Patient Medical Advice Request Mailing List  Subject: Prescription Question    Joanne Dewitt,  After going back and forth between my insurance for over a month, my prescription for the Botox is now being filled through [address] [place] [address]. [facility] won't send you my Botox without prior authorization from your office. Could you please have someone from your office give them a call at [phone], thank you so much!      I'm overdue for my Botox :(   Thanks,   Reather Numbers

## 2019-02-05 ENCOUNTER — TELEPHONE (OUTPATIENT)
Dept: NEUROLOGY | Age: 29
End: 2019-02-05

## 2019-02-05 NOTE — TELEPHONE ENCOUNTER
Received request from pharmacy for [address] for [facility]. Submitted through Cover my meds.     PA Pending  Key# [id number]

## 2019-03-19 ENCOUNTER — TELEPHONE (OUTPATIENT)
Dept: INTERNAL MEDICINE CLINIC | Age: 29
End: 2019-03-19

## 2019-03-19 NOTE — TELEPHONE ENCOUNTER
No same day appts available. Offered appt on 3/21/19, Pt states she will discuss with provider at f/u.

## 2019-03-19 NOTE — TELEPHONE ENCOUNTER
----- Message from Leandra Rowan sent at 3/19/2019  9:54 AM EDT -----  Regarding: Dr. Luna Kid  Pt is experiencing sensitive bumps on her face that comes and goes. Pt would like to be seen today if possible. No appts available .  Best contact  [phone]

## 2019-04-02 ENCOUNTER — OFFICE VISIT (OUTPATIENT)
Dept: NEUROLOGY | Age: 29
End: 2019-04-02

## 2019-04-02 VITALS
BODY MASS INDEX: 29.48 KG/M2 | HEART RATE: 124 BPM | RESPIRATION RATE: 20 BRPM | HEIGHT: 61 IN | OXYGEN SATURATION: 99 % | DIASTOLIC BLOOD PRESSURE: 82 MMHG | SYSTOLIC BLOOD PRESSURE: 126 MMHG

## 2019-04-02 DIAGNOSIS — G43.009 MIGRAINE WITHOUT AURA AND WITHOUT STATUS MIGRAINOSUS, NOT INTRACTABLE: ICD-10-CM

## 2019-04-02 RX ORDER — RIZATRIPTAN BENZOATE 10 MG/1
TABLET, ORALLY DISINTEGRATING ORAL
Qty: 9 TAB | Refills: 5 | Status: SHIPPED | OUTPATIENT
Start: 2019-04-02 | End: 2020-04-16

## 2019-04-02 RX ORDER — BUTALBITAL, ACETAMINOPHEN AND CAFFEINE 50; 325; 40 MG/1; MG/1; MG/1
TABLET ORAL
Qty: 30 TAB | Refills: 5 | Status: SHIPPED | OUTPATIENT
Start: 2019-04-02 | End: 2020-01-29

## 2019-04-02 NOTE — PROGRESS NOTES
Melody Simons is a 30 y.o. female who presents with the following  Chief Complaint   Patient presents with    Migraine       HPI  Patient comes in for a follow up for migraines. Started Aimovig 70 mg and has just taken her 3rd injection this past weekend. Has noticed a decrease in HA still with about 2-3 a week lasting about 2 hours or longer. She using Cambia as rescue. Tried imitrex in the past and caused her to feel terrible. Cambia may not be working as quick as It used to work in about 30 minutes. Having migraines now usually over the left eye. States it is an annoying pressure. Light and sound sensitivity. Stress has increased more with school. Allergies   Allergen Reactions    Salicylic Acid-Sulfur Anaphylaxis    Sulfur Unknown (comments)       Current Outpatient Medications   Medication Sig    rizatriptan (MAXALT-MLT) 10 mg disintegrating tablet 1 at HA onset and repeat in 2 hours if needed. Max 2 in 24 hours    butalbital-acetaminophen-caffeine (FIORICET, ESGIC) -40 mg per tablet Take 1-2 tablets by mouth every 6-8 hours PRN    Diclofenac Potassium (CAMBIA) 50 mg pwpk MIX AND DRINK 1 PACKET BY MOUTH AT ONSET OF HEADACHE. MAY REPEAT AGAIN IN 2 HOURS FOR 1 DOSE. MAX 2 DOSES IN 24 HOURS    DULoxetine (CYMBALTA) 60 mg capsule Take 1 Cap by mouth daily.  erenumab-aooe (AIMOVIG AUTOINJECTOR) 70 mg/mL atIn 1 Syringe by SubCUTAneous route every twenty-eight (28) days.  cyanocobalamin, vitamin B-12, 5,000 mcg TbDi DISSOLVE 1 TABLET BY MOUTH ONCE DAILY    Bifidobacterium Infantis (ALIGN) 4 mg cap Take  by mouth.  oxymetazoline (AFRIN) 0.05 % nasal spray 2 Sprays two (2) times a day.  MICROGESTIN FE 1.5/30, 28, 1.5 mg-30 mcg (21)/75 mg (7) tab     CETIRIZINE HCL (ZYRTEC PO) Take  by mouth. No current facility-administered medications for this visit.         Social History     Tobacco Use   Smoking Status Never Smoker   Smokeless Tobacco Never Used       Past Medical History:   Diagnosis Date    Acne     Allergic rhinitis 08/24/2016    shots Dr. Odilia Ponce    Arrhythmia     B12 deficiency 2/9/2017    Calculus of kidney     Headache     Migraine without aura and without status migrainosus, not intractable 01/19/2017    botox injections. Dr. Julio Mcclure    Nephrolithiasis 8/24/2016       Past Surgical History:   Procedure Laterality Date    HX LITHOTRIPSY         Family History   Problem Relation Age of Onset    Cancer Father         Prostate    Headache Father     Heart Disease Father     Diabetes Father        Social History     Socioeconomic History    Marital status: SINGLE     Spouse name: Not on file    Number of children: Not on file    Years of education: Not on file    Highest education level: Not on file   Tobacco Use    Smoking status: Never Smoker    Smokeless tobacco: Never Used   Substance and Sexual Activity    Alcohol use: Yes    Drug use: No    Sexual activity: Not Currently       Review of Systems   Eyes: Positive for photophobia. Negative for blurred vision and double vision. Gastrointestinal: Negative for nausea and vomiting. Neurological: Positive for headaches. Negative for dizziness, tingling, tremors, seizures and loss of consciousness. Remainder of comprehensive review is negative. Physical Exam :    Visit Vitals  /82   Pulse (!) 124   Resp 20   Ht 5' 1\" (1.549 m)   SpO2 99%   BMI 29.48 kg/m²       General: Well defined, nourished, and groomed individual in no acute distress.    Neck: Supple, nontender, no bruits, no pain with resistance to active range of motion.    Heart: Regular rate and rhythm, no murmurs, rub, or gallop. Normal S1S2.   Lungs: Clear to auscultation bilaterally with equal chest expansion, no cough, no wheeze  Musculoskeletal: Extremities revealed no edema and had full range of motion of joints.    Psych: Good mood and bright affect    NEUROLOGICAL EXAMINATION:    Mental Status: Alert and oriented to person, place, and time    Cranial Nerves:    II, III, IV, VI: Visual acuity grossly intact. Visual fields are normal.    Pupils are equal, round, and reactive to light and accommodation.    Extra-ocular movements are full and fluid. Fundoscopic exam was benign, no ptosis or nystagmus.    V-XII: Hearing is grossly intact. Facial features are symmetric, with normal sensation and strength. The palate rises symmetrically and the tongue protrudes midline. Sternocleidomastoids 5/5. Motor Examination: Normal tone, bulk, and strength, 5/5 muscle strength throughout. Coordination: Finger to nose was normal. No resting or intention tremor    Gait and Station: Steady while walking. Normal arm swing. No pronator drift. No muscle wasting or fasiculations noted. Reflexes: DTRs 2+ throughout. Results for orders placed or performed in visit on 18   VITAMIN B12   Result Value Ref Range    Vitamin B12 >2000 (H) 232 - 6119 pg/mL   METABOLIC PANEL, BASIC   Result Value Ref Range    Glucose 74 65 - 99 mg/dL    BUN 12 6 - 20 mg/dL    Creatinine 0.66 0.57 - 1.00 mg/dL    GFR est non- >59 mL/min/1.73    GFR est  >59 mL/min/1.73    BUN/Creatinine ratio 18 9 - 23    Sodium 141 134 - 144 mmol/L    Potassium 4.1 3.5 - 5.2 mmol/L    Chloride 99 96 - 106 mmol/L    CO2 25 18 - 29 mmol/L    Calcium 8.9 8.7 - 10.2 mg/dL   CBC W/O DIFF   Result Value Ref Range    WBC 12.6 (H) 3.4 - 10.8 x10E3/uL    RBC 4.56 3.77 - 5.28 x10E6/uL    HGB 13.1 11.1 - 15.9 g/dL    HCT 38.9 34.0 - 46.6 %    MCV 85 79 - 97 fL    MCH 28.7 26.6 - 33.0 pg    MCHC 33.7 31.5 - 35.7 g/dL    RDW 13.8 12.3 - 15.4 %    PLATELET 495 954 - 361 x10E3/uL       Orders Placed This Encounter    rizatriptan (MAXALT-MLT) 10 mg disintegrating tablet     Si at HA onset and repeat in 2 hours if needed.   Max 2 in 24 hours     Dispense:  9 Tab     Refill:  5    butalbital-acetaminophen-caffeine (FIORICET, ESGIC) -40 mg per tablet     Sig: Take 1-2 tablets by mouth every 6-8 hours PRN     Dispense:  30 Tab     Refill:  5    Diclofenac Potassium (CAMBIA) 50 mg pwpk     Sig: MIX AND DRINK 1 PACKET BY MOUTH AT ONSET OF HEADACHE. MAY REPEAT AGAIN IN 2 HOURS FOR 1 DOSE. MAX 2 DOSES IN 24 HOURS     Dispense:  9 Packet     Refill:  5       1. Migraine without aura and without status migrainosus, not intractable      Migraines   Keep Aimovig and she will message us before next appointment to discuss whether or not we will increase to 140 mg for next injection. We will keep Cambia for rescue for now. Try Maxalt MLT 10 mg for rescue of migraine. Also can try Fioricet for rescue as she has not tried this either. Imitrex caused her to feel terrible and feel like she was going to black out. She will continue to track her symptoms. Try to help stress.                This note will not be viewable in [facility]

## 2019-04-02 NOTE — PROGRESS NOTES
Headaches- getting a little bit more, but not severe   Just over her eye brow    Super stressed lately which is a major stresser   Getting them 2 times a week, not a migraine just a headache   Lasting maybe 2 hours with the cambia   She takes it as soon as she feels them come on

## 2019-04-25 ENCOUNTER — OFFICE VISIT (OUTPATIENT)
Dept: INTERNAL MEDICINE CLINIC | Age: 29
End: 2019-04-25

## 2019-04-25 VITALS
HEART RATE: 90 BPM | BODY MASS INDEX: 32.85 KG/M2 | WEIGHT: 174 LBS | RESPIRATION RATE: 16 BRPM | SYSTOLIC BLOOD PRESSURE: 118 MMHG | OXYGEN SATURATION: 98 % | HEIGHT: 61 IN | DIASTOLIC BLOOD PRESSURE: 82 MMHG | TEMPERATURE: 98.1 F

## 2019-04-25 DIAGNOSIS — E53.8 B12 DEFICIENCY: ICD-10-CM

## 2019-04-25 DIAGNOSIS — R63.5 WEIGHT GAIN: ICD-10-CM

## 2019-04-25 DIAGNOSIS — F41.9 ANXIETY: Primary | ICD-10-CM

## 2019-04-25 DIAGNOSIS — G43.009 MIGRAINE WITHOUT AURA AND WITHOUT STATUS MIGRAINOSUS, NOT INTRACTABLE: ICD-10-CM

## 2019-04-25 RX ORDER — DULOXETIN HYDROCHLORIDE 60 MG/1
60 CAPSULE, DELAYED RELEASE ORAL DAILY
Qty: 30 CAP | Refills: 5 | Status: SHIPPED | OUTPATIENT
Start: 2019-04-25 | End: 2019-11-01 | Stop reason: SDUPTHER

## 2019-04-25 RX ORDER — PHENTERMINE HYDROCHLORIDE 37.5 MG/1
37.5 TABLET ORAL
Qty: 30 TAB | Refills: 0 | Status: SHIPPED | OUTPATIENT
Start: 2019-04-25 | End: 2019-05-24

## 2019-04-25 RX ORDER — CYANOCOBALAMIN 1000 UG/ML
1000 INJECTION, SOLUTION INTRAMUSCULAR; SUBCUTANEOUS ONCE
Qty: 1 ML | Refills: 0
Start: 2019-04-25 | End: 2019-04-25

## 2019-04-25 NOTE — PROGRESS NOTES
Balbir Kingston is a 30 y.o. female who presents today for Anxiety; Vitamin B12 Deficiency; and Weight Gain Tello Ramirez She has a history of  
Patient Active Problem List  
Diagnosis Code  Nephrolithiasis N20.0  Allergic rhinitis J30.9  Family history of early CAD Z80.55  
 Migraine without aura and without status migrainosus, not intractable G43.009  
 B12 deficiency E53.8  Anxiety F41.9  Weight gain R63.5 Tello Ramirez Today patient is here for f/u. Anxiety -we raise her Cymbalta dose up to 60 mg last visit. She was having a bit more anxiety. Since she notes her anxiety has been well controlled. Patient is seen for anxiety disorder. Current treatment includes Cymbalta and no other therapies. Ongoing symptoms include: none. Patient denies: sweating, chest pain, dizziness, paresthesias, racing thoughts, feelings of losing control, difficulty concentrating, suicidal ideation. Denies substance or alcohol abuse. Reported side effects from the treatment: none. Migraine: Follow with neurology. She was previously receiving Botox injections. She has since been placed on Aimovig. She notes that this is really helping her migraines. Weight Gain: Patient notes that she has been progressively gaining weight. She is watching her diet she is eating appropriate diet eating appropriate meals and also exercising regularly. Though we have not checked in some time her thyroid previously has been normal.  She notes that her cycles are regular. No medications have changed with the exception of Cymbalta being raised last year. We discussed that metabolism can slow down over time. We discussed also using short course of phentermine to help with her weight loss but will wait until blood work is resulted. B12 Deficiency: Feels as though she she gets more improvement with the IM injections. We will check a B12 level today. ROS Review of Systems Constitutional: Positive for malaise/fatigue. Negative for chills, fever and weight loss. Weight gain Respiratory: Negative for cough and shortness of breath. Cardiovascular: Negative for chest pain, palpitations and leg swelling. Gastrointestinal: Negative for abdominal pain, nausea and vomiting. Genitourinary: Negative for dysuria, frequency, hematuria and urgency. Musculoskeletal: Negative for back pain, myalgias and neck pain. Skin: Negative for itching and rash. Neurological: Positive for headaches (Well-controlled). Negative for dizziness, tingling, tremors, sensory change, speech change, focal weakness and weakness. Endo/Heme/Allergies: Does not bruise/bleed easily. Psychiatric/Behavioral: Negative for depression. The patient is not nervous/anxious. Visit Vitals /82 (BP 1 Location: Left arm, BP Patient Position: Sitting) Pulse (!) 111 Temp 98.1 °F (36.7 °C) (Oral) Resp 16 Ht 5' 1\" (1.549 m) Wt 174 lb (78.9 kg) SpO2 98% BMI 32.88 kg/m² Physical Exam  
Constitutional: She is oriented to person, place, and time. She appears well-developed and well-nourished. No distress. HENT:  
Head: Normocephalic and atraumatic. Neck: Normal range of motion. Neck supple. No thyromegaly present. Cardiovascular: Normal rate and regular rhythm. No murmur heard. Pulmonary/Chest: Effort normal and breath sounds normal. She has no wheezes. Abdominal: Soft. Bowel sounds are normal. She exhibits no distension. Musculoskeletal: She exhibits no edema. Neurological: She is alert and oriented to person, place, and time. No cranial nerve deficit. Skin: Skin is warm and dry. Psychiatric: She has a normal mood and affect. Her behavior is normal.  
 
 
 
Current Outpatient Medications Medication Sig  cyanocobalamin (VITAMIN B-12) 1,000 mcg/mL injection 1 mL by IntraMUSCular route once for 1 dose.  DULoxetine (CYMBALTA) 60 mg capsule Take 1 Cap by mouth daily.  phentermine (ADIPEX-P) 37.5 mg tablet Take 1 Tab by mouth every morning. Max Daily Amount: 37.5 mg.  
 rizatriptan (MAXALT-MLT) 10 mg disintegrating tablet 1 at HA onset and repeat in 2 hours if needed. Max 2 in 24 hours  butalbital-acetaminophen-caffeine (FIORICET, ESGIC) -40 mg per tablet Take 1-2 tablets by mouth every 6-8 hours PRN  
 Diclofenac Potassium (CAMBIA) 50 mg pwpk MIX AND DRINK 1 PACKET BY MOUTH AT ONSET OF HEADACHE. MAY REPEAT AGAIN IN 2 HOURS FOR 1 DOSE. MAX 2 DOSES IN 24 HOURS  erenumab-aooe (AIMOVIG AUTOINJECTOR) 70 mg/mL atIn 1 Syringe by SubCUTAneous route every twenty-eight (28) days.  cyanocobalamin, vitamin B-12, 5,000 mcg TbDi DISSOLVE 1 TABLET BY MOUTH ONCE DAILY  Bifidobacterium Infantis (ALIGN) 4 mg cap Take  by mouth.  oxymetazoline (AFRIN) 0.05 % nasal spray 2 Sprays two (2) times a day.  MICROGESTIN FE 1.5/30, 28, 1.5 mg-30 mcg (21)/75 mg (7) tab  CETIRIZINE HCL (ZYRTEC PO) Take  by mouth. No current facility-administered medications for this visit. Past Medical History:  
Diagnosis Date  Acne  Allergic rhinitis 08/24/2016  
 shots Dr. Umang Vallecillo  Arrhythmia  B12 deficiency 2/9/2017  Calculus of kidney  Headache  Migraine without aura and without status migrainosus, not intractable 01/19/2017  
 botox injections. Dr. Toscano  Nephrolithiasis 8/24/2016 Past Surgical History:  
Procedure Laterality Date  HX LITHOTRIPSY Social History Tobacco Use  Smoking status: Never Smoker  Smokeless tobacco: Never Used Substance Use Topics  Alcohol use: Yes Family History Problem Relation Age of Onset  Cancer Father Prostate  Headache Father  Heart Disease Father  Diabetes Father Allergies Allergen Reactions  Salicylic Acid-Sulfur Anaphylaxis  Sulfur Unknown (comments) Assessment/Plan Diagnoses and all orders for this visit: 
 
1. Anxiety -much better with current dose of Cymbalta. We will continue this 
-     DULoxetine (CYMBALTA) 60 mg capsule; Take 1 Cap by mouth daily. 2. B12 deficiency -she is on p.o. replacement we will give an injection today. She does note that she feels much better with IM B12. If her B12 is down a bit we may continue IM monthly 
-     VITAMIN E63 
-     METABOLIC PANEL, BASIC 
-     TSH 3RD GENERATION 
-     VITAMIN B12 INJECTION 
-     THER/PROPH/DIAG INJECTION, SUBCUT/IM 
-     cyanocobalamin (VITAMIN B-12) 1,000 mcg/mL injection; 1 mL by IntraMUSCular route once for 1 dose. 3. Weight gain -repeat thyroid. Unlikely related sex hormone as her cycles are regular. No changes in medications. Discussed that it is unlikely that Cymbalta would be causing this but if in the future were unable to control this we may try reducing her dose. We will try a short course of phentermine if her blood work comes back normal and see her back within 30 days to make sure that her tachycardia is not worsened. -     VITAMIN L61 
-     METABOLIC PANEL, BASIC 
-     TSH 3RD GENERATION 
-     phentermine (ADIPEX-P) 37.5 mg tablet; Take 1 Tab by mouth every morning. Max Daily Amount: 37.5 mg. 
 
4. Migraine without aura and without status migrainosus, not intractable - seeing neurology current therapy working well Jessy Reed MD 
4/25/2019 This note was created with the help of speech recognition software (Dragon) and may contain some 'sound alike' errors.

## 2019-04-26 LAB
BUN SERPL-MCNC: 12 MG/DL (ref 6–20)
BUN/CREAT SERPL: 19 (ref 9–23)
CALCIUM SERPL-MCNC: 9.5 MG/DL (ref 8.7–10.2)
CHLORIDE SERPL-SCNC: 99 MMOL/L (ref 96–106)
CO2 SERPL-SCNC: 26 MMOL/L (ref 20–29)
CREAT SERPL-MCNC: 0.63 MG/DL (ref 0.57–1)
GLUCOSE SERPL-MCNC: 80 MG/DL (ref 65–99)
POTASSIUM SERPL-SCNC: 4.3 MMOL/L (ref 3.5–5.2)
SODIUM SERPL-SCNC: 139 MMOL/L (ref 134–144)
TSH SERPL DL<=0.005 MIU/L-ACNC: 1.71 UIU/ML (ref 0.45–4.5)
VIT B12 SERPL-MCNC: >2000 PG/ML (ref 232–1245)

## 2019-05-24 ENCOUNTER — OFFICE VISIT (OUTPATIENT)
Dept: INTERNAL MEDICINE CLINIC | Age: 29
End: 2019-05-24

## 2019-05-24 VITALS
OXYGEN SATURATION: 98 % | WEIGHT: 170 LBS | TEMPERATURE: 98.5 F | RESPIRATION RATE: 16 BRPM | HEART RATE: 118 BPM | HEIGHT: 61 IN | BODY MASS INDEX: 32.1 KG/M2 | SYSTOLIC BLOOD PRESSURE: 116 MMHG | DIASTOLIC BLOOD PRESSURE: 80 MMHG

## 2019-05-24 DIAGNOSIS — F41.9 ANXIETY: ICD-10-CM

## 2019-05-24 DIAGNOSIS — E66.9 CLASS 1 OBESITY IN ADULT, UNSPECIFIED BMI, UNSPECIFIED OBESITY TYPE, UNSPECIFIED WHETHER SERIOUS COMORBIDITY PRESENT: Primary | ICD-10-CM

## 2019-05-24 DIAGNOSIS — E53.8 B12 DEFICIENCY: ICD-10-CM

## 2019-05-24 DIAGNOSIS — G43.009 MIGRAINE WITHOUT AURA AND WITHOUT STATUS MIGRAINOSUS, NOT INTRACTABLE: ICD-10-CM

## 2019-05-24 DIAGNOSIS — G47.00 INSOMNIA, UNSPECIFIED TYPE: ICD-10-CM

## 2019-05-24 RX ORDER — PHENTERMINE HYDROCHLORIDE 37.5 MG/1
37.5 TABLET ORAL
Qty: 30 TAB | Refills: 2 | Status: SHIPPED | OUTPATIENT
Start: 2019-05-24 | End: 2019-07-08

## 2019-05-24 RX ORDER — CYANOCOBALAMIN 1000 UG/ML
1000 INJECTION, SOLUTION INTRAMUSCULAR; SUBCUTANEOUS ONCE
Qty: 1 ML | Refills: 0
Start: 2019-05-24 | End: 2019-05-24

## 2019-05-24 NOTE — PROGRESS NOTES
Richa Bustos is a 30 y.o. female who presents today for Medication Evaluation; Weight Management; and Anxiety  . She has a history of   Patient Active Problem List   Diagnosis Code    Nephrolithiasis N20.0    Allergic rhinitis J30.9    Family history of early CAD Z80.55    Migraine without aura and without status migrainosus, not intractable G43.009    B12 deficiency E53.8    Anxiety F41.9    Weight gain R63.5   . Today patient is here for a follow-up visit. .     Obesity: Patient has been gaining more weight. We discussed options including a short course of phentermine. We did start her on this last visit. She notes that overall her weight has decreased some. This is helping her curb her appetite. She notes initially she was having some insomnia with this but since has resolved. Overall she is down approximately 4 pounds. She notes that she is feeling well and this is helping her with her weight loss. .     Anxiety -on a bit higher dose Cymbalta. Notes that overall her anxiety is well controlled. Patient is seen for anxiety disorder. Current treatment includes Cymbalta and no other therapies. Ongoing symptoms include: none. Patient denies: sweating, chest pain, dizziness, paresthesias, racing thoughts, feelings of losing control, difficulty concentrating, suicidal ideation. Denies substance or alcohol abuse. Reported side effects from the treatment: none. B12 Def: Patient notes that she feels better after injections versus p.o. B12. We will give her another injection today. ROS  Review of Systems   Constitutional: Negative for chills, fever and weight loss. HENT: Negative for congestion and sore throat. Eyes: Negative for blurred vision, double vision and photophobia. Respiratory: Negative for cough and shortness of breath. Cardiovascular: Negative for chest pain, palpitations and leg swelling.    Gastrointestinal: Negative for abdominal pain, constipation, diarrhea, heartburn, nausea and vomiting. Genitourinary: Negative for dysuria, frequency and urgency. Musculoskeletal: Negative for joint pain and myalgias. Skin: Negative for rash. Neurological: Negative. Negative for headaches. Endo/Heme/Allergies: Does not bruise/bleed easily. Psychiatric/Behavioral: Negative for depression, memory loss and suicidal ideas. The patient is not nervous/anxious. Visit Vitals  /80 (BP 1 Location: Left arm, BP Patient Position: Sitting)   Pulse (!) 118   Temp 98.5 °F (36.9 °C) (Oral)   Resp 16   Ht 5' 1\" (1.549 m)   Wt 170 lb (77.1 kg)   SpO2 98%   BMI 32.12 kg/m²       Physical Exam   Constitutional: She is oriented to person, place, and time. She appears well-developed and well-nourished. HENT:   Head: Normocephalic and atraumatic. Cardiovascular: Regular rhythm. No murmur heard. Borderline tachycardic   Pulmonary/Chest: Effort normal. No stridor. No respiratory distress. She has no wheezes. She has no rales. Neurological: She is alert and oriented to person, place, and time. Skin: Skin is warm and dry. Psychiatric: She has a normal mood and affect. Her behavior is normal.         Current Outpatient Medications   Medication Sig    DULoxetine (CYMBALTA) 60 mg capsule Take 1 Cap by mouth daily.  phentermine (ADIPEX-P) 37.5 mg tablet Take 1 Tab by mouth every morning. Max Daily Amount: 37.5 mg.    rizatriptan (MAXALT-MLT) 10 mg disintegrating tablet 1 at HA onset and repeat in 2 hours if needed. Max 2 in 24 hours    butalbital-acetaminophen-caffeine (FIORICET, ESGIC) -40 mg per tablet Take 1-2 tablets by mouth every 6-8 hours PRN    Diclofenac Potassium (CAMBIA) 50 mg pwpk MIX AND DRINK 1 PACKET BY MOUTH AT ONSET OF HEADACHE. MAY REPEAT AGAIN IN 2 HOURS FOR 1 DOSE. MAX 2 DOSES IN 24 HOURS    erenumab-aooe (AIMOVIG AUTOINJECTOR) 70 mg/mL atIn 1 Syringe by SubCUTAneous route every twenty-eight (28) days.     Bifidobacterium Infantis (ALIGN) 4 mg cap Take  by mouth.  oxymetazoline (AFRIN) 0.05 % nasal spray 2 Sprays two (2) times a day.  MICROGESTIN FE 1.5/30, 28, 1.5 mg-30 mcg (21)/75 mg (7) tab     CETIRIZINE HCL (ZYRTEC PO) Take  by mouth.  cyanocobalamin, vitamin B-12, 5,000 mcg TbDi DISSOLVE 1 TABLET BY MOUTH ONCE DAILY     No current facility-administered medications for this visit. Past Medical History:   Diagnosis Date    Acne     Allergic rhinitis 08/24/2016    shots Dr. Adi Trevino    Arrhythmia     B12 deficiency 2/9/2017    Calculus of kidney     Headache     Migraine without aura and without status migrainosus, not intractable 01/19/2017    botox injections. Dr. Duran Tyler    Nephrolithiasis 8/24/2016      Past Surgical History:   Procedure Laterality Date    HX LITHOTRIPSY        Social History     Tobacco Use    Smoking status: Never Smoker    Smokeless tobacco: Never Used   Substance Use Topics    Alcohol use: Yes      Family History   Problem Relation Age of Onset    Cancer Father         Prostate    Headache Father     Heart Disease Father     Diabetes Father         Allergies   Allergen Reactions    Salicylic Acid-Sulfur Anaphylaxis    Sulfur Unknown (comments)        Assessment/Plan  Diagnoses and all orders for this visit:    1. Class 1 obesity in adult, unspecified BMI, unspecified obesity type, unspecified whether serious comorbidity present -continue and complete a 4-month course of phentermine. I have also provided her with Saxenda contact to see if this is covered by insurance. -     phentermine (ADIPEX-P) 37.5 mg tablet; Take 1 Tab by mouth every morning. Max Daily Amount: 37.5 mg.    2. Anxiety -stable and doing well on the higher dose Cymbalta. Continue this    3. B12 deficiency -we will give her injection today. -     VITAMIN B12 INJECTION  -     THER/PROPH/DIAG INJECTION, SUBCUT/IM  -     cyanocobalamin (VITAMIN B-12) 1,000 mcg/mL injection; 1 mL by IntraMUSCular route once for 1 dose.     4. Insomnia, unspecified type -initial side effect of phentermine. This is resolved    5. Migraine without aura and without status migrainosus, not intractable -patient doing well with no injectable. Freida Quiles MD  5/24/2019    This note was created with the help of speech recognition software Iris Ahuja) and may contain some 'sound alike' errors.

## 2019-07-02 ENCOUNTER — TELEPHONE (OUTPATIENT)
Dept: INTERNAL MEDICINE CLINIC | Age: 29
End: 2019-07-02

## 2019-07-08 ENCOUNTER — OFFICE VISIT (OUTPATIENT)
Dept: INTERNAL MEDICINE CLINIC | Age: 29
End: 2019-07-08

## 2019-07-08 VITALS
HEART RATE: 115 BPM | DIASTOLIC BLOOD PRESSURE: 80 MMHG | HEIGHT: 61 IN | BODY MASS INDEX: 32.1 KG/M2 | RESPIRATION RATE: 16 BRPM | WEIGHT: 170 LBS | SYSTOLIC BLOOD PRESSURE: 128 MMHG | OXYGEN SATURATION: 96 % | TEMPERATURE: 98.4 F

## 2019-07-08 DIAGNOSIS — E53.8 B12 DEFICIENCY: ICD-10-CM

## 2019-07-08 DIAGNOSIS — E66.9 OBESITY, UNSPECIFIED CLASSIFICATION, UNSPECIFIED OBESITY TYPE, UNSPECIFIED WHETHER SERIOUS COMORBIDITY PRESENT: Primary | ICD-10-CM

## 2019-07-08 DIAGNOSIS — F41.9 ANXIETY: ICD-10-CM

## 2019-07-08 DIAGNOSIS — G43.009 MIGRAINE WITHOUT AURA AND WITHOUT STATUS MIGRAINOSUS, NOT INTRACTABLE: ICD-10-CM

## 2019-07-08 DIAGNOSIS — R63.5 WEIGHT GAIN: ICD-10-CM

## 2019-07-08 DIAGNOSIS — N20.0 NEPHROLITHIASIS: ICD-10-CM

## 2019-07-08 NOTE — PROGRESS NOTES
Keiry Petit is a 30 y.o. female who presents today for Medication Evaluation  . She has a history of   Patient Active Problem List   Diagnosis Code    Nephrolithiasis N20.0    Allergic rhinitis J30.9    Family history of early CAD Z80.55    Migraine without aura and without status migrainosus, not intractable G43.009    B12 deficiency E53.8    Anxiety F41.9    Weight gain R63.5   . Today patient is here for f/u. Overweight: Started phentermine earlier this year to help her with weight loss. Since she notes some decreased appetite but her weight has remained stable. She is still watching her diet very closely and exercising 4 to 5 days a week. We discussed possibility of seeing a nutritionist or seeing if [facility] weight loss takes her insurance. .  We have evaluated her thyroid and B12 and these are all normal.  Patient has looked into Saxenda in the past and likely needs a prior authorization. I also discussed with her talking to her OB/GYN about her oral contraceptives to see if may be by stopping these she may be able to obtain some weight loss. She is currently still on phentermine and has been able to maintain a 4 pound weight loss. She is unable to take Topamax due to kidney stones while on this in the past.    Anxiety - stable on Cymbalta. We discussed that reviewing adverse reactions to Cymbalta it is listed as weight gain is less than 1%. Patient notes that her mental health is as good as it has been and she is concerned about changing the dose of this medication. 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 Patient is seen for anxiety disorder. Current treatment includes Cymbalta and no other therapies. Ongoing symptoms include: mild insomnia.  Patient denies: sweating, chest pain, dizziness, paresthesias, racing thoughts, feelings of losing control, difficulty concentrating, suicidal ideation. Denies substance or alcohol abuse. Reported side effects from the treatment: mild sweating. She continues to follow with neurology for her headaches. These are stable. ROS  Review of Systems   Constitutional: Negative for chills, fever and weight loss. HENT: Negative for congestion and sore throat. Eyes: Negative for blurred vision, double vision, photophobia and pain. Respiratory: Negative for cough and shortness of breath. Cardiovascular: Negative for chest pain, palpitations, orthopnea, claudication and leg swelling. Gastrointestinal: Negative for abdominal pain, constipation, diarrhea, heartburn, nausea and vomiting. Genitourinary: Negative for dysuria, frequency and urgency. Musculoskeletal: Negative for back pain, falls, joint pain, myalgias and neck pain. Skin: Negative for rash. Neurological: Negative. Negative for headaches. Endo/Heme/Allergies: Does not bruise/bleed easily. Psychiatric/Behavioral: Negative for depression, hallucinations, memory loss, substance abuse and suicidal ideas. The patient is nervous/anxious. The patient does not have insomnia. Visit Vitals  /80 (BP 1 Location: Left arm, BP Patient Position: Sitting)   Pulse (!) 115   Temp 98.4 °F (36.9 °C) (Oral)   Resp 16   Ht 5' 1\" (1.549 m)   Wt 170 lb (77.1 kg)   SpO2 96%   BMI 32.12 kg/m²       Physical Exam   Constitutional: She is oriented to person, place, and time. She appears well-developed and well-nourished. No distress. HENT:   Head: Normocephalic and atraumatic. Neck: Normal range of motion. Neck supple. No thyromegaly present. Cardiovascular: Normal rate and regular rhythm. No murmur heard. Pulmonary/Chest: Effort normal and breath sounds normal. She has no wheezes. Abdominal: Soft. Bowel sounds are normal. She exhibits no distension. Musculoskeletal: She exhibits no edema. Neurological: She is alert and oriented to person, place, and time.  No cranial nerve deficit. Skin: Skin is warm and dry. Psychiatric: She has a normal mood and affect. Her behavior is normal.         Current Outpatient Medications   Medication Sig    DULoxetine (CYMBALTA) 60 mg capsule Take 1 Cap by mouth daily.  rizatriptan (MAXALT-MLT) 10 mg disintegrating tablet 1 at HA onset and repeat in 2 hours if needed. Max 2 in 24 hours    butalbital-acetaminophen-caffeine (FIORICET, ESGIC) -40 mg per tablet Take 1-2 tablets by mouth every 6-8 hours PRN    Diclofenac Potassium (CAMBIA) 50 mg pwpk MIX AND DRINK 1 PACKET BY MOUTH AT ONSET OF HEADACHE. MAY REPEAT AGAIN IN 2 HOURS FOR 1 DOSE. MAX 2 DOSES IN 24 HOURS    erenumab-aooe (AIMOVIG AUTOINJECTOR) 70 mg/mL atIn 1 Syringe by SubCUTAneous route every twenty-eight (28) days.  Bifidobacterium Infantis (ALIGN) 4 mg cap Take  by mouth.  oxymetazoline (AFRIN) 0.05 % nasal spray 2 Sprays two (2) times a day.  MICROGESTIN FE 1.5/30, 28, 1.5 mg-30 mcg (21)/75 mg (7) tab     CETIRIZINE HCL (ZYRTEC PO) Take  by mouth.  phentermine (ADIPEX-P) 37.5 mg tablet Take 1 Tab by mouth every morning. Max Daily Amount: 37.5 mg.    cyanocobalamin, vitamin B-12, 5,000 mcg TbDi DISSOLVE 1 TABLET BY MOUTH ONCE DAILY     No current facility-administered medications for this visit. Past Medical History:   Diagnosis Date    Acne     Allergic rhinitis 08/24/2016    shots Dr. Vivian Samayoa    Arrhythmia     B12 deficiency 2/9/2017    Calculus of kidney     Headache     Migraine without aura and without status migrainosus, not intractable 01/19/2017    botox injections.   Dr. Ronny Souza    Nephrolithiasis 8/24/2016      Past Surgical History:   Procedure Laterality Date    HX LITHOTRIPSY        Social History     Tobacco Use    Smoking status: Never Smoker    Smokeless tobacco: Never Used   Substance Use Topics    Alcohol use: Yes      Family History   Problem Relation Age of Onset    Cancer Father         Prostate    Headache Father    [facility] Heart Disease Father     Diabetes Father         Allergies   Allergen Reactions    Salicylic Acid-Sulfur Anaphylaxis    Sulfur Unknown (comments)        Assessment/Plan  Diagnoses and all orders for this visit:    1. Obesity, unspecified classification, unspecified obesity type, unspecified whether serious comorbidity present -patient not able to lose weight with phentermine. She is currently still on this. We discussed trying Saxenda and seeing if a prior Realius will work. Patient is working on diet and exercise and is doing the appropriate things. Other options include [facility] weight loss to see if they approve her insurance. Metabolically I do not see any other reasons for her not to build to lose weight. She could talk with her OB/GYN about stopping her oral contraceptive. We discussed that Cymbalta can sometimes be associated with weight gain but this is very rare and since her mental health this is stable we will leave her on this dose for now  -     liraglutide (SAXENDA) 3 mg/0.5 mL (18 mg/3 mL) pen; 0.6 mg once daily for one week; increase by 0.6 mg daily at weekly intervals to a target dose of 3 mg once daily. 2. Anxiety -stable on this dose. 3. Weight gain    4. Nephrolithiasis -patient had a kidney stone while she was on Topamax. 5. B12 deficiency -patient B12 levels been stable. 6. Migraine without aura and without status migrainosus, not intractable -stable and following with neurology. Hari Parry MD  7/8/2019    This note was created with the help of speech recognition software Camilla Saha) and may contain some 'sound alike' errors.

## 2019-07-19 ENCOUNTER — OFFICE VISIT (OUTPATIENT)
Dept: NEUROLOGY | Age: 29
End: 2019-07-19

## 2019-07-19 VITALS
WEIGHT: 170 LBS | HEIGHT: 61 IN | OXYGEN SATURATION: 99 % | HEART RATE: 126 BPM | BODY MASS INDEX: 32.1 KG/M2 | DIASTOLIC BLOOD PRESSURE: 82 MMHG | SYSTOLIC BLOOD PRESSURE: 112 MMHG

## 2019-07-19 DIAGNOSIS — G43.909 MIGRAINE WITHOUT STATUS MIGRAINOSUS, NOT INTRACTABLE, UNSPECIFIED MIGRAINE TYPE: Primary | ICD-10-CM

## 2019-07-19 RX ORDER — FEXOFENADINE HCL AND PSEUDOEPHEDRINE HCI 180; 240 MG/1; MG/1
1 TABLET, EXTENDED RELEASE ORAL DAILY
COMMUNITY

## 2019-07-19 NOTE — PROGRESS NOTES
Melinda Lozada is a 30 y.o. female who presents with the following  Chief Complaint   Patient presents with    Headache       HPI       Patient comes in for a follow up for migraines. Started Aimovig 70 mg and did 1 dose of 140 mg recently but was not sure if she got the entire 2nd dose. Has noticed a decrease in HA still with about 2-3 a week lasting about 2 hours or longer. She using Cambia as rescue but not working as well. Tried imitrex in the past and caused her to feel terrible. Cambia may not be working as quick as It used to work in about 30 minutes. Fioricet causes her to go to sleep but can help. Having migraines now usually over the left eye. States it is an annoying pressure. Light and sound sensitivity. Stress has increased recently. Weather has been a trigger. Not tried Maxalt                Allergies   Allergen Reactions    Salicylic Acid-Sulfur Anaphylaxis    Sulfur Unknown (comments)       Current Outpatient Medications   Medication Sig    fexofenadine-pseudoephedrine (ALLEGRA-D 24 HOUR) 180-240 mg per tablet Take 1 Tab by mouth daily.  erenumab-aooe (AIMOVIG AUTOINJECTOR) 140 mg/mL injection 1 mL by SubCUTAneous route every twenty-eight (28) days.  erenumab-aooe (AIMOVIG AUTOINJECTOR) 140 mg/mL injection 1 mL by SubCUTAneous route every thirty (30) days.  liraglutide (SAXENDA) 3 mg/0.5 mL (18 mg/3 mL) pen 0.6 mg once daily for one week; increase by 0.6 mg daily at weekly intervals to a target dose of 3 mg once daily.  DULoxetine (CYMBALTA) 60 mg capsule Take 1 Cap by mouth daily.  rizatriptan (MAXALT-MLT) 10 mg disintegrating tablet 1 at HA onset and repeat in 2 hours if needed. Max 2 in 24 hours    butalbital-acetaminophen-caffeine (FIORICET, ESGIC) -40 mg per tablet Take 1-2 tablets by mouth every 6-8 hours PRN    Diclofenac Potassium (CAMBIA) 50 mg pwpk MIX AND DRINK 1 PACKET BY MOUTH AT ONSET OF HEADACHE. MAY REPEAT AGAIN IN 2 HOURS FOR 1 DOSE.  MAX 2 DOSES IN 24 HOURS    erenumab-aooe (AIMOVIG AUTOINJECTOR) 70 mg/mL atIn 1 Syringe by SubCUTAneous route every twenty-eight (28) days.  Bifidobacterium Infantis (ALIGN) 4 mg cap Take  by mouth.  MICROGESTIN FE 1.5/30, 28, 1.5 mg-30 mcg (21)/75 mg (7) tab      No current facility-administered medications for this visit. Social History     Tobacco Use   Smoking Status Never Smoker   Smokeless Tobacco Never Used       Past Medical History:   Diagnosis Date    Acne     Allergic rhinitis 08/24/2016    shots Dr. Rivas Book    Arrhythmia     B12 deficiency 2/9/2017    Calculus of kidney     Headache     Migraine without aura and without status migrainosus, not intractable 01/19/2017    botox injections. Dr. Juan Ramon Vazquez    Nephrolithiasis 8/24/2016       Past Surgical History:   Procedure Laterality Date    HX LITHOTRIPSY         Family History   Problem Relation Age of Onset    Cancer Father         Prostate    Headache Father     Heart Disease Father     Diabetes Father        Social History     Socioeconomic History    Marital status: SINGLE     Spouse name: Not on file    Number of children: Not on file    Years of education: Not on file    Highest education level: Not on file   Tobacco Use    Smoking status: Never Smoker    Smokeless tobacco: Never Used   Substance and Sexual Activity    Alcohol use: Yes    Drug use: No    Sexual activity: Not Currently       Review of Systems   Eyes: Positive for blurred vision and photophobia. Negative for double vision. Respiratory: Negative for shortness of breath and wheezing. Cardiovascular: Negative for chest pain and palpitations. Gastrointestinal: Positive for nausea. Negative for vomiting. Neurological: Positive for dizziness and headaches. Negative for tingling, tremors, seizures and loss of consciousness. Remainder of comprehensive review is negative.      Physical Exam :    Visit Vitals  /82   Pulse (!) 126   Ht 5' 1\" (1.549 m) Wt 77.1 kg (170 lb)   LMP 06/24/2019   SpO2 99%   BMI 32.12 kg/m²       General: Well defined, nourished, and groomed individual in no acute distress.    Neck: Supple, nontender, no bruits, no pain with resistance to active range of motion.    Heart: Regular rate and rhythm, no murmurs, rub, or gallop. Normal S1S2. Lungs: Clear to auscultation bilaterally with equal chest expansion, no cough, no wheeze  Musculoskeletal: Extremities revealed no edema and had full range of motion of joints.    Psych: Good mood and bright affect    NEUROLOGICAL EXAMINATION:    Mental Status: Alert and oriented to person, place, and time    Cranial Nerves:    II, III, IV, VI: Visual acuity grossly intact. Visual fields are normal.    Pupils are equal, round, and reactive to light and accommodation.    Extra-ocular movements are full and fluid. Fundoscopic exam was benign, no ptosis or nystagmus.    V-XII: Hearing is grossly intact. Facial features are symmetric, with normal sensation and strength. The palate rises symmetrically and the tongue protrudes midline. Sternocleidomastoids 5/5. Motor Examination: Normal tone, bulk, and strength, 5/5 muscle strength throughout. Coordination: Finger to nose was normal. No resting or intention tremor    Gait and Station: Steady while walking. Normal arm swing. No pronator drift. No muscle wasting or fasiculations noted. Reflexes: DTRs 2+ throughout.         Results for orders placed or performed in visit on 04/25/19   VITAMIN B12   Result Value Ref Range    Vitamin B12 >2000 (H) 232 - 0285 pg/mL   METABOLIC PANEL, BASIC   Result Value Ref Range    Glucose 80 65 - 99 mg/dL    BUN 12 6 - 20 mg/dL    Creatinine 0.63 0.57 - 1.00 mg/dL    GFR est non- >59 mL/min/1.73    GFR est  >59 mL/min/1.73    BUN/Creatinine ratio 19 9 - 23    Sodium 139 134 - 144 mmol/L    Potassium 4.3 3.5 - 5.2 mmol/L    Chloride 99 96 - 106 mmol/L    CO2 26 20 - 29 mmol/L    Calcium 9.5 8.7 - 10.2 mg/dL   TSH 3RD GENERATION   Result Value Ref Range    TSH 1.710 0.450 - 4.500 uIU/mL       Orders Placed This Encounter    fexofenadine-pseudoephedrine (ALLEGRA-D 24 HOUR) 180-240 mg per tablet     Sig: Take 1 Tab by mouth daily.  erenumab-aooe (AIMOVIG AUTOINJECTOR) 140 mg/mL injection     Si mL by SubCUTAneous route every twenty-eight (28) days. Dispense:  1 Each     Refill:  5    erenumab-aooe (AIMOVIG AUTOINJECTOR) 140 mg/mL injection     Si mL by SubCUTAneous route every thirty (30) days. Dispense:  1 Each     Refill:  0     Order Specific Question:   Expiration Date     Answer:   2020     Order Specific Question:   Lot#     Answer:   [id number]     Order Specific Question:        Answer:   [employer]       No diagnosis found. Migraines. Increase to 140 mg dosing as the 70 has been working fairly well but wants to further evaluate. Do 140 X 3-4 months to see how this works. Keep [facility], [phone] [facility]. Try Maxalt for PRN rescue. Continue to track and call with any changes.              This note will not be viewable in [employer]

## 2019-07-24 ENCOUNTER — TELEPHONE (OUTPATIENT)
Dept: INTERNAL MEDICINE CLINIC | Age: 29
End: 2019-07-24

## 2019-07-24 NOTE — TELEPHONE ENCOUNTER
Justine Abraham called to ask if Dr. Smiley Oh received the appeal form for Saxenda 18mg. Please complete and fax back to the number listed on the form. Or call [phone] with Ref Key #[id number]. Thanks.

## 2019-08-06 ENCOUNTER — DOCUMENTATION ONLY (OUTPATIENT)
Dept: INTERNAL MEDICINE CLINIC | Age: 29
End: 2019-08-06

## 2019-08-06 NOTE — PROGRESS NOTES
Followed up with Dinorah ([phone], opt 2) regarding Pt's Saxenda. It is excluded from Pt's plan, there are no preferred alternatives. Advised Pt of this via mychart.

## 2019-10-03 ENCOUNTER — OFFICE VISIT (OUTPATIENT)
Dept: INTERNAL MEDICINE CLINIC | Age: 29
End: 2019-10-03

## 2019-10-03 VITALS
HEART RATE: 125 BPM | TEMPERATURE: 99.2 F | DIASTOLIC BLOOD PRESSURE: 78 MMHG | SYSTOLIC BLOOD PRESSURE: 104 MMHG | RESPIRATION RATE: 16 BRPM | WEIGHT: 177 LBS | BODY MASS INDEX: 33.42 KG/M2 | OXYGEN SATURATION: 98 % | HEIGHT: 61 IN

## 2019-10-03 DIAGNOSIS — F41.0 PANIC ATTACK: ICD-10-CM

## 2019-10-03 DIAGNOSIS — F41.9 ANXIETY: Primary | ICD-10-CM

## 2019-10-03 DIAGNOSIS — R53.83 FATIGUE, UNSPECIFIED TYPE: ICD-10-CM

## 2019-10-03 RX ORDER — PROPRANOLOL HYDROCHLORIDE 20 MG/1
20 TABLET ORAL
Qty: 60 TAB | Refills: 1 | Status: SHIPPED | OUTPATIENT
Start: 2019-10-03 | End: 2020-01-03 | Stop reason: SDUPTHER

## 2019-10-03 RX ORDER — CLONAZEPAM 0.5 MG/1
0.5 TABLET ORAL
Qty: 30 TAB | Refills: 0 | Status: SHIPPED | OUTPATIENT
Start: 2019-10-03 | End: 2020-01-03 | Stop reason: SDUPTHER
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
## 2019-10-22 DIAGNOSIS — G43.009 MIGRAINE WITHOUT AURA AND WITHOUT STATUS MIGRAINOSUS, NOT INTRACTABLE: ICD-10-CM

## 2019-10-23 ENCOUNTER — TELEPHONE (OUTPATIENT)
Dept: NEUROLOGY | Age: 29
End: 2019-10-23

## 2019-10-23 NOTE — TELEPHONE ENCOUNTER
Prior Auth submitted for Cambia to [employer] via Cover My Meds. Status Pending. Allow 24-72 hours for response.       [facility] Key: [id number]  Case #: [id number]

## 2019-10-24 NOTE — TELEPHONE ENCOUNTER
Prior Auth DENIED for Cambia by Dinorah. Denial reason states:     -Patient must have trial and failure or contraindication to TWO preferred oral NSAIDS, one of which MUST be generic diclofenac. Can appeal decision, or provider can prescribe one of the formulary alternatives. Denial scanned into media for review.

## 2019-10-30 ENCOUNTER — PATIENT MESSAGE (OUTPATIENT)
Dept: INTERNAL MEDICINE CLINIC | Age: 29
End: 2019-10-30

## 2019-10-30 DIAGNOSIS — F41.9 ANXIETY: ICD-10-CM

## 2019-11-01 RX ORDER — DULOXETIN HYDROCHLORIDE 60 MG/1
60 CAPSULE, DELAYED RELEASE ORAL DAILY
Qty: 90 CAP | Refills: 1 | Status: SHIPPED | OUTPATIENT
Start: 2019-11-01 | End: 2020-02-26

## 2019-11-01 NOTE — TELEPHONE ENCOUNTER
From: Nisha Cardenas  To: Mahi Montero MD  Sent: 10/30/2019 9:13 PM EDT  Subject: Prescription Question    Dr. Julieth Corral,  I went to the sleep clinic today. They have submitted the request for a sleep study to my insurance, but said the study wouldn't be for 3-4 weeks after that. I will call and schedule my follow up with you after I get my results from the sleep study. Also, I forgot to get a prescription for the Cymbalta when I saw you a few weeks ago. I am out of refills, would you be able to call it into [facility] for me?    Thank you,  Medardo Raya

## 2020-01-03 ENCOUNTER — OFFICE VISIT (OUTPATIENT)
Dept: INTERNAL MEDICINE CLINIC | Age: 30
End: 2020-01-03

## 2020-01-03 VITALS
RESPIRATION RATE: 16 BRPM | HEART RATE: 129 BPM | TEMPERATURE: 98.3 F | OXYGEN SATURATION: 98 % | WEIGHT: 182 LBS | HEIGHT: 61 IN | DIASTOLIC BLOOD PRESSURE: 90 MMHG | SYSTOLIC BLOOD PRESSURE: 124 MMHG | BODY MASS INDEX: 34.36 KG/M2

## 2020-01-03 DIAGNOSIS — G43.009 MIGRAINE WITHOUT AURA AND WITHOUT STATUS MIGRAINOSUS, NOT INTRACTABLE: ICD-10-CM

## 2020-01-03 DIAGNOSIS — R63.5 WEIGHT GAIN: ICD-10-CM

## 2020-01-03 DIAGNOSIS — F41.9 ANXIETY: Primary | ICD-10-CM

## 2020-01-03 DIAGNOSIS — F41.0 PANIC ATTACK: ICD-10-CM

## 2020-01-03 RX ORDER — PROPRANOLOL HYDROCHLORIDE 20 MG/1
20 TABLET ORAL 2 TIMES DAILY
Qty: 60 TAB | Refills: 2
Start: 2020-01-03 | End: 2020-01-30

## 2020-01-03 RX ORDER — CLONAZEPAM 0.5 MG/1
0.5 TABLET ORAL
Qty: 60 TAB | Refills: 1 | Status: SHIPPED | OUTPATIENT
Start: 2020-01-03 | End: 2020-04-15

## 2020-01-03 NOTE — PROGRESS NOTES
Carrol West is a 30 y.o. female who presents today for Results and Sleep Problem  . She has a history of   Patient Active Problem List   Diagnosis Code    Nephrolithiasis N20.0    Allergic rhinitis J30.9    Family history of early CAD Z80.55    Migraine without aura and without status migrainosus, not intractable G43.009    B12 deficiency E53.8    Anxiety F41.9    Weight gain R63.5   . Today patient is here for follow-up. .     Status post sleep study. This was negative for obstructive sleep apnea. She does have occasional insomnia and she has used clonazepam with some help to go to sleep. Anxiety: Patient with significant anxiety. She is now on Cymbalta at 60 mg.  I believe there is a misunderstanding and she has not been taking any the propranolol. The prescription does say as needed but I would like her to try taking this regularly to see if this helps with her underlying anxiety and tachycardia. She has recently started seeing a psychologist. Only seen once. She is unsure that she wants to continue this. She notes that overall her anxiety is still a bit high, but several recent anxiety attacks have been helped with the addition of Klonopin. She is not back yet to school will be starting this next week. Migraines: Continues to follow with neurology. Overall these are improved. Notes that she discussed with her OB/GYN regarding an IUD and she was told that since she has never had children they should not put an IUD in. She currently continues to take oral contraceptive. She is considering stopping these as she is not currently sexually active. Previously she did have some dysfunctional uterine bleeding. I also discussed potential for worsening migraines when she does stop this. She has gained a bit more weight. We discussed intermittent fasting    ROS  Review of Systems   Constitutional: Negative for chills, fever and weight loss.    HENT: Negative for congestion and sore throat. Eyes: Negative for blurred vision, double vision and photophobia. Respiratory: Negative for cough, hemoptysis, sputum production and shortness of breath. Cardiovascular: Positive for palpitations. Negative for chest pain and leg swelling. Gastrointestinal: Negative for abdominal pain, constipation, diarrhea, heartburn, nausea and vomiting. Genitourinary: Negative for dysuria, frequency, hematuria and urgency. Musculoskeletal: Negative for joint pain and myalgias. Skin: Negative for rash. Neurological: Negative. Negative for headaches (stable. ). Endo/Heme/Allergies: Does not bruise/bleed easily. Psychiatric/Behavioral: Negative for depression, hallucinations, memory loss, substance abuse and suicidal ideas. The patient is nervous/anxious and has insomnia. Visit Vitals  /90 (BP 1 Location: Left arm, BP Patient Position: Sitting)   Pulse (!) 129   Temp 98.3 °F (36.8 °C) (Oral)   Resp 16   Ht 5' 1\" (1.549 m)   Wt 182 lb (82.6 kg)   SpO2 98%   BMI 34.39 kg/m²       Physical Exam  Constitutional:       General: She is not in acute distress. Appearance: She is well-developed. HENT:      Head: Normocephalic and atraumatic. Neck:      Musculoskeletal: Normal range of motion and neck supple. Thyroid: No thyromegaly. Cardiovascular:      Rate and Rhythm: Regular rhythm. Tachycardia present. Heart sounds: No murmur. Pulmonary:      Effort: Pulmonary effort is normal.      Breath sounds: Normal breath sounds. No wheezing. Abdominal:      General: Bowel sounds are normal. There is no distension. Palpations: Abdomen is soft. Skin:     General: Skin is warm and dry. Neurological:      Mental Status: She is alert and oriented to person, place, and time. Cranial Nerves: No cranial nerve deficit.    Psychiatric:         Behavior: Behavior normal.           Current Outpatient Medications   Medication Sig    clonazePAM (KLONOPIN) 0.5 mg tablet Take 1 Tab by mouth two (2) times daily as needed (anxiety or insomnia). Max Daily Amount: 1 mg.  propranolol (INDERAL) 20 mg tablet Take 1 Tab by mouth two (2) times a day.  DULoxetine (CYMBALTA) 60 mg capsule Take 1 Cap by mouth daily.  Diclofenac Potassium (CAMBIA) 50 mg pwpk MIX AND DRINK 1 PACKET BY MOUTH AT ONSET OF HEADACHE. MAY REPEAT AGAIN IN 2 HOURS FOR 1 DOSE. MAX 2 DOSES IN 24 HOURS    Diclofenac Potassium (CAMBIA) 50 mg pwpk MIX AND DRINK 1 PACKET BY MOUTH AT ONSET OF HEADACHE. MAY REPEAT AGAIN IN 2 HOURS FOR 1 DOSE. MAX 2 DOSES IN 24 HOURS    fexofenadine-pseudoephedrine (ALLEGRA-D 24 HOUR) 180-240 mg per tablet Take 1 Tab by mouth daily.  erenumab-aooe (AIMOVIG AUTOINJECTOR) 140 mg/mL injection 1 mL by SubCUTAneous route every twenty-eight (28) days.  erenumab-aooe (AIMOVIG AUTOINJECTOR) 140 mg/mL injection 1 mL by SubCUTAneous route every thirty (30) days.  rizatriptan (MAXALT-MLT) 10 mg disintegrating tablet 1 at HA onset and repeat in 2 hours if needed. Max 2 in 24 hours    butalbital-acetaminophen-caffeine (FIORICET, ESGIC) -40 mg per tablet Take 1-2 tablets by mouth every 6-8 hours PRN    erenumab-aooe (AIMOVIG AUTOINJECTOR) 70 mg/mL atIn 1 Syringe by SubCUTAneous route every twenty-eight (28) days.  Bifidobacterium Infantis (ALIGN) 4 mg cap Take  by mouth.  MICROGESTIN FE 1.5/30, 28, 1.5 mg-30 mcg (21)/75 mg (7) tab      No current facility-administered medications for this visit. Past Medical History:   Diagnosis Date    Acne     Allergic rhinitis 08/24/2016    shots Dr. Keenan Em    Arrhythmia     B12 deficiency 2/9/2017    Calculus of kidney     Headache     Migraine without aura and without status migrainosus, not intractable 01/19/2017    botox injections.   Dr. Cece Bardales    Nephrolithiasis 8/24/2016      Past Surgical History:   Procedure Laterality Date    HX LITHOTRIPSY        Social History     Tobacco Use    Smoking status: Never Smoker    Smokeless tobacco: Never Used   Substance Use Topics    Alcohol use: Yes      Family History   Problem Relation Age of Onset    Cancer Father         Prostate    Headache Father     Heart Disease Father     Diabetes Father         Allergies   Allergen Reactions    Salicylic Acid-Sulfur Anaphylaxis    Sulfur Unknown (comments)        Assessment/Plan  Diagnoses and all orders for this visit:    1. Anxiety=-okay to take PRN clonazepam.  I urged her to continue to doing therapy. She has not been on regular propranolol and I urged her to start taking this regularly twice daily to see if this helps control tachycardia and some anxiety symptoms. Continue Cymbalta at current dose. Patient will see me back in 2 to 3 months to see how she is doing.  -     clonazePAM (KLONOPIN) 0.5 mg tablet; Take 1 Tab by mouth two (2) times daily as needed (anxiety or insomnia). Max Daily Amount: 1 mg.  -     propranolol (INDERAL) 20 mg tablet; Take 1 Tab by mouth two (2) times a day. 2. Panic attack  -     clonazePAM (KLONOPIN) 0.5 mg tablet; Take 1 Tab by mouth two (2) times daily as needed (anxiety or insomnia). Max Daily Amount: 1 mg.  -     propranolol (INDERAL) 20 mg tablet; Take 1 Tab by mouth two (2) times a day. 3. Weight gain-has gained a bit more weight. She was told by her OB/GYN that given the fact she is never had children they would not consider putting an IUD in. She is considering stopping oral contraceptives to see how this does with her weight gain. We discussed monitoring her headaches as this could trigger worsening of her headaches. 4. Migraine without aura and without status migrainosus, not intractable-stable overall and seeing neurology. Murtaza Clayton MD  1/3/2020    This note was created with the help of speech recognition software Gabriel Anne) and may contain some 'sound alike' errors.

## 2020-01-13 RX ORDER — ERENUMAB-AOOE 140 MG/ML
INJECTION, SOLUTION SUBCUTANEOUS
Qty: 1 ML | Refills: 5 | Status: SHIPPED | OUTPATIENT
Start: 2020-01-13 | End: 2020-07-15

## 2020-01-29 ENCOUNTER — OFFICE VISIT (OUTPATIENT)
Dept: NEUROLOGY | Age: 30
End: 2020-01-29

## 2020-01-29 VITALS
OXYGEN SATURATION: 98 % | SYSTOLIC BLOOD PRESSURE: 110 MMHG | HEART RATE: 86 BPM | WEIGHT: 181 LBS | HEIGHT: 61 IN | BODY MASS INDEX: 34.17 KG/M2 | DIASTOLIC BLOOD PRESSURE: 82 MMHG

## 2020-01-29 DIAGNOSIS — G43.919 INTRACTABLE MIGRAINE WITHOUT STATUS MIGRAINOSUS, UNSPECIFIED MIGRAINE TYPE: Primary | ICD-10-CM

## 2020-01-29 NOTE — PROGRESS NOTES
Pt is here today for a f/u. Pt states she is having two a week. Her PCP put her on propanol for increase heart rate and she says it has helped with her migraines.
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
## 2020-02-25 RX ORDER — GALCANEZUMAB 120 MG/ML
INJECTION, SOLUTION SUBCUTANEOUS
Qty: 1 ML | Refills: 5 | Status: SHIPPED | OUTPATIENT
Start: 2020-02-25 | End: 2020-08-04

## 2020-02-26 DIAGNOSIS — F41.9 ANXIETY: ICD-10-CM

## 2020-02-26 RX ORDER — DULOXETIN HYDROCHLORIDE 60 MG/1
CAPSULE, DELAYED RELEASE ORAL
Qty: 30 CAP | Refills: 5 | Status: SHIPPED | OUTPATIENT
Start: 2020-02-26 | End: 2020-06-13 | Stop reason: SDUPTHER

## 2020-04-16 RX ORDER — RIZATRIPTAN BENZOATE 10 MG/1
TABLET, ORALLY DISINTEGRATING ORAL
Qty: 9 TAB | Refills: 5 | Status: SHIPPED | OUTPATIENT
Start: 2020-04-16 | End: 2020-10-02 | Stop reason: SDUPTHER

## 2020-06-08 RX ORDER — UBROGEPANT 100 MG/1
TABLET ORAL
Qty: 10 TAB | Refills: 5 | Status: SHIPPED | OUTPATIENT
Start: 2020-06-08 | End: 2020-10-02

## 2020-07-15 ENCOUNTER — VIRTUAL VISIT (OUTPATIENT)
Dept: INTERNAL MEDICINE CLINIC | Age: 30
End: 2020-07-15

## 2020-07-15 DIAGNOSIS — F41.0 PANIC ATTACK: ICD-10-CM

## 2020-07-15 DIAGNOSIS — E53.8 B12 DEFICIENCY: ICD-10-CM

## 2020-07-15 DIAGNOSIS — G43.009 MIGRAINE WITHOUT AURA AND WITHOUT STATUS MIGRAINOSUS, NOT INTRACTABLE: ICD-10-CM

## 2020-07-15 DIAGNOSIS — F41.9 ANXIETY: Primary | ICD-10-CM

## 2020-07-15 RX ORDER — DULOXETIN HYDROCHLORIDE 60 MG/1
60 CAPSULE, DELAYED RELEASE ORAL DAILY
Qty: 90 CAP | Refills: 1 | Status: SHIPPED | OUTPATIENT
Start: 2020-07-15 | End: 2021-02-16 | Stop reason: SDUPTHER

## 2020-07-15 RX ORDER — PROPRANOLOL HYDROCHLORIDE 20 MG/1
20 TABLET ORAL 2 TIMES DAILY
Qty: 180 TAB | Refills: 1 | Status: SHIPPED | OUTPATIENT
Start: 2020-07-15 | End: 2021-02-10 | Stop reason: SDUPTHER

## 2020-07-15 NOTE — PROGRESS NOTES
Alexia Saldana was seen on 7/15/2020 using synchronous (real-time) audio-video technology; doxy. me. Consent: Alexia Saldana, who was seen by synchronous (real-time) audio-video technology, and/or her healthcare decision maker, is aware that this patient-initiated, Telehealth encounter on 7/15/2020 is a billable service, with coverage as determined by her insurance carrier. She is aware that she may receive a bill and has provided verbal consent to proceed: Yes. I was in the office while conducting this encounter. Alexia Saldana is a 26 y.o. female who presents today for Medication Refill  . She has a history of   Patient Active Problem List   Diagnosis Code    Nephrolithiasis N20.0    Allergic rhinitis J30.9    Family history of early CAD Z80.55    Migraine without aura and without status migrainosus, not intractable G43.009    B12 deficiency E53.8    Anxiety F41.9    Weight gain R63.5   . Today patient is being seen for f/u.   she does not have other concerns. Anxiety: At last visit we urged patient to start her beta-blocker regularly. We also continued her Cymbalta which was helping her with her anxiety. Patient also did have a prescription for as needed benzodiazepine.  verified and her last fill for clonazepam 60 tablets was on 15 April. Since last visit patient reports that her stress levels have been stable. .     Migraines: Patient has no longer on Aimovig since last visit. Now on Emgality. ROS  Review of Systems   Constitutional: Negative for chills, fever and weight loss. Eyes: Negative for blurred vision, double vision, photophobia and pain. Respiratory: Negative for cough and shortness of breath. Cardiovascular: Negative for chest pain, palpitations and leg swelling. Gastrointestinal: Negative for abdominal pain, constipation, diarrhea, nausea and vomiting. Genitourinary: Negative for dysuria, frequency, hematuria and urgency. Neurological: Negative. Endo/Heme/Allergies: Does not bruise/bleed easily. Psychiatric/Behavioral: Negative for depression. The patient is not nervous/anxious. There were no vitals taken for this visit. Patient-Reported Vitals 7/15/2020   Patient-Reported Weight 174lbs   Patient-Reported Height 5'1\"        Physical Exam  Constitutional:       Appearance: Normal appearance. HENT:      Head: Normocephalic and atraumatic. Pulmonary:      Effort: Pulmonary effort is normal.   Neurological:      General: No focal deficit present. Mental Status: She is alert. Psychiatric:         Mood and Affect: Mood normal.         Behavior: Behavior normal.           Current Outpatient Medications   Medication Sig    DULoxetine (CYMBALTA) 60 mg capsule Take 1 Cap by mouth daily.  propranoloL (INDERAL) 20 mg tablet Take 1 Tab by mouth two (2) times a day. Dotty Jed 100 mg tablet TAKE 1 TABLET BY MOUTH AT ONSET OF HEADACHE. MAY REPEAT ONCE IN 2 HOURS IF NEEDED. MAX OF 2 TABLETS PER DAY    rizatriptan (MAXALT-MLT) 10 mg disintegrating tablet TAKE 1 TABLETS BY MOUTH AT THE ONSET OF HEADACHE AND REPEAT IN 2 HOURS IF NEEDED. MAX OF 2 IN 24 HRS    clonazePAM (KlonoPIN) 0.5 mg tablet TAKE 1 TABLET BY MOUTH TWICE DAILY AS NEEDED FOR ANXIETY OR INSOMNIA    EMGALITY  mg/mL injection ADMINISTER 1 ML UNDER THE SKIN EVERY 30 DAYS    Diclofenac Potassium (CAMBIA) 50 mg pwpk MIX AND DRINK 1 PACKET BY MOUTH AT ONSET OF HEADACHE. MAY REPEAT AGAIN IN 2 HOURS FOR 1 DOSE. MAX 2 DOSES IN 24 HOURS    Diclofenac Potassium (CAMBIA) 50 mg pwpk MIX AND DRINK 1 PACKET BY MOUTH AT ONSET OF HEADACHE. MAY REPEAT AGAIN IN 2 HOURS FOR 1 DOSE. MAX 2 DOSES IN 24 HOURS    fexofenadine-pseudoephedrine (ALLEGRA-D 24 HOUR) 180-240 mg per tablet Take 1 Tab by mouth daily.  Bifidobacterium Infantis (ALIGN) 4 mg cap Take  by mouth.     MICROGESTIN FE 1.5/30, 28, 1.5 mg-30 mcg (21)/75 mg (7) tab      No current facility-administered medications for this visit. Past Medical History:   Diagnosis Date    Acne     Allergic rhinitis 08/24/2016    shots Dr. Angela Angel    Arrhythmia     B12 deficiency 2/9/2017    Calculus of kidney     Headache     Migraine without aura and without status migrainosus, not intractable 01/19/2017    botox injections. Dr. Jason Emery    Nephrolithiasis 8/24/2016      Past Surgical History:   Procedure Laterality Date    HX LITHOTRIPSY        Social History     Tobacco Use    Smoking status: Never Smoker    Smokeless tobacco: Never Used   Substance Use Topics    Alcohol use: Yes      Family History   Problem Relation Age of Onset    Cancer Father         Prostate    Headache Father     Heart Disease Father     Diabetes Father         Allergies   Allergen Reactions    Salicylic Acid-Sulfur Anaphylaxis    Sulfur Unknown (comments)        Assessment/Plan  Diagnoses and all orders for this visit:    1. Anxiety-overall stable continue current therapy. -     DULoxetine (CYMBALTA) 60 mg capsule; Take 1 Cap by mouth daily. -     propranoloL (INDERAL) 20 mg tablet; Take 1 Tab by mouth two (2) times a day. .    2. Migraine without aura and without status migrainosus, not intractable-stable on current therapy. 3. B12 deficiency- No longer taking replacement. 4. Panic attack-notes anxiety is better since being on propranolol.  -     propranoloL (INDERAL) 20 mg tablet; Take 1 Tab by mouth two (2) times a day. Zulema Odom is a 26 y.o. female being evaluated by a video visit encounter for concerns as above. A caregiver was present when appropriate. Due to this being a TeleHealth encounter (During PZJLP-06 public health emergency), evaluation of the following organ systems was limited: Vitals/Constitutional/EENT/Resp/CV/GI//MS/Neuro/Skin/Heme-Lymph-Imm.   Pursuant to the emergency declaration under the [address] and Response Supplemental Appropriations Act, this Virtual  Visit was conducted, with patient's (and/or legal guardian's) consent, to reduce the patient's risk of exposure to COVID-19 and provide necessary medical care. Services were provided through a video synchronous discussion virtually to substitute for in-person clinic visit. Patient and provider were located at their individual homes. John Molina MD  7/15/2020    This note was created with the help of speech recognition software [employer]) and may contain some 'sound alike' errors.

## 2020-08-04 RX ORDER — GALCANEZUMAB 120 MG/ML
INJECTION, SOLUTION SUBCUTANEOUS
Qty: 1 ML | Refills: 5 | Status: SHIPPED | OUTPATIENT
Start: 2020-08-04 | End: 2020-09-17 | Stop reason: SDUPTHER

## 2020-09-10 ENCOUNTER — TELEPHONE (OUTPATIENT)
Dept: INTERNAL MEDICINE CLINIC | Age: 30
End: 2020-09-10

## 2020-09-10 RX ORDER — ERYTHROMYCIN 5 MG/G
OINTMENT OPHTHALMIC
Qty: 1 TUBE | Refills: 0 | Status: SHIPPED | OUTPATIENT
Start: 2020-09-10 | End: 2021-04-02 | Stop reason: ALTCHOICE

## 2020-09-10 NOTE — TELEPHONE ENCOUNTER
Spoke with patient and advised her that a rx has been sent to her local pharmacy. Pt understood and was thankful for the call.

## 2020-09-10 NOTE — TELEPHONE ENCOUNTER
----- Message from Elio Lewis sent at 9/10/2020  7:33 AM EDT -----  Regarding: Dr. Kay Sosa  Pt is requesting a call and is stating that she woke up with pink eye and she is a  so being that school just started she wanted an Rx for pink eye sent to [facility] (on file). Best contact number [phone]. (if unavailable, leave a voicemail, pt starts teaching at 8 AM).

## 2020-09-17 RX ORDER — GALCANEZUMAB 120 MG/ML
INJECTION, SOLUTION SUBCUTANEOUS
Qty: 1 ML | Refills: 5 | Status: SHIPPED | OUTPATIENT
Start: 2020-09-17 | End: 2020-10-02 | Stop reason: SDUPTHER

## 2020-10-02 ENCOUNTER — VIRTUAL VISIT (OUTPATIENT)
Dept: NEUROLOGY | Age: 30
End: 2020-10-02
Payer: COMMERCIAL

## 2020-10-02 DIAGNOSIS — G43.919 INTRACTABLE MIGRAINE WITHOUT STATUS MIGRAINOSUS, UNSPECIFIED MIGRAINE TYPE: Primary | ICD-10-CM

## 2020-10-02 PROCEDURE — 99213 OFFICE O/P EST LOW 20 MIN: CPT | Performed by: NURSE PRACTITIONER

## 2020-10-02 RX ORDER — RIZATRIPTAN BENZOATE 10 MG/1
TABLET, ORALLY DISINTEGRATING ORAL
Qty: 9 TAB | Refills: 5 | Status: SHIPPED | OUTPATIENT
Start: 2020-10-02 | End: 2021-08-03 | Stop reason: SDUPTHER

## 2020-10-02 RX ORDER — GALCANEZUMAB 120 MG/ML
INJECTION, SOLUTION SUBCUTANEOUS
Qty: 1 ML | Refills: 5 | Status: SHIPPED | OUTPATIENT
Start: 2020-10-02 | End: 2021-07-12

## 2020-10-02 NOTE — PROGRESS NOTES
Will Casey is a 26 y.o. female who was seen by synchronous (real-time) audio-video technology on 10/2/2020 for No chief complaint on file. FU for migraines. 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 She started virtual and will continue as virtual as one of the 2 teachers left to do this. Noticed with more computer time, neck straining, posture things can get worse. She is still adjusting to this. New chair, tried blue light glasses and made things worse. She is getting about 2-3 a month but fairly painful. Nothing has changed just pain is intense. On Emgality every 30 days and in about 1 week until injection notices they ramp up   Can switch to 28 days    Cambia works the best for PRN rescue but having trouble with mail order. Also uses Maxalt MLT and this is doing well. Does not make her feel bad. Ubrelvy did not really help. Overall is doing well with no other issues. Assessment & Plan:       Subjective:       Prior to Admission medications    Medication Sig Start Date End Date Taking? Authorizing Provider   galcanezumab-gnlm (Emgality Pen) 120 mg/mL injection ADMINISTER 120MG UNDER THE SKIN EVERY 28 DAYS 10/2/20  Yes Nicolas Villela, NP   rizatriptan (MAXALT-MLT) 10 mg disintegrating tablet TAKE 1 TABLETS BY MOUTH AT THE ONSET OF HEADACHE AND REPEAT IN 2 HOURS IF NEEDED. MAX OF 2 IN 24 HRS 10/2/20  Yes Nicolas Villela, NP   galcanezumab-gnlm (Emgality Pen) 120 mg/mL injection ADMINISTER 120MG UNDER THE SKIN EVERY 30 DAYS 9/17/20 10/2/20  Nicolas Villela NP   erythromycin (ILOTYCIN) ophthalmic ointment Apply half inch to affected eye 4 times daily for 5 days. 9/10/20   Victoria Ariza MD   DULoxetine (CYMBALTA) 60 mg capsule Take 1 Cap by mouth daily. 7/15/20   Victoria Ariza MD   propranoloL (INDERAL) 20 mg tablet Take 1 Tab by mouth two (2) times a day.  . 7/15/20   Victoria Ariza MD   Ubrelvy 100 mg tablet TAKE 1 TABLET BY MOUTH AT ONSET OF HEADACHE. MAY REPEAT ONCE IN 2 HOURS IF NEEDED. MAX OF 2 TABLETS PER DAY 6/8/20 10/2/20  O'EdmoreAdam Pap, NP   rizatriptan (MAXALT-MLT) 10 mg disintegrating tablet TAKE 1 TABLETS BY MOUTH AT THE ONSET OF HEADACHE AND REPEAT IN 2 HOURS IF NEEDED. MAX OF 2 IN 24 HRS 4/16/20 10/2/20  O'XochitlStefaniein Pap, NP   clonazePAM (KlonoPIN) 0.5 mg tablet TAKE 1 TABLET BY MOUTH TWICE DAILY AS NEEDED FOR ANXIETY OR INSOMNIA 4/15/20   Dimitri Yee MD   Diclofenac Potassium (CAMBIA) 50 mg pwpk MIX AND DRINK 1 PACKET BY MOUTH AT ONSET OF HEADACHE. MAY REPEAT AGAIN IN 2 HOURS FOR 1 DOSE. MAX 2 DOSES IN 24 HOURS 1/30/20 10/2/20  O'Stefanie Leungin Pap, NP   Diclofenac Potassium (CAMBIA) 50 mg pwpk MIX AND DRINK 1 PACKET BY MOUTH AT ONSET OF HEADACHE. MAY REPEAT AGAIN IN 2 HOURS FOR 1 DOSE. MAX 2 DOSES IN 24 HOURS 10/22/19 10/2/20  O'Stefanie Leungin Pap, NP   fexofenadine-pseudoephedrine (ALLEGRA-D 24 HOUR) 180-240 mg per tablet Take 1 Tab by mouth daily. Provider, Historical   Bifidobacterium Infantis (ALIGN) 4 mg cap Take  by mouth.     Provider, Historical   MICROGESTIN FE 1.5/30, 28, 1.5 mg-30 mcg (21)/75 mg (7) tab  3/30/16   Provider, Historical     Patient Active Problem List   Diagnosis Code    Nephrolithiasis N20.0    Allergic rhinitis J30.9    Family history of early CAD Z80.55    Migraine without aura and without status migrainosus, not intractable G43.009    B12 deficiency E53.8    Anxiety F41.9    Weight gain R63.5     Patient Active Problem List    Diagnosis Date Noted    Anxiety 04/25/2019    Weight gain 04/25/2019    B12 deficiency 02/09/2017    Migraine without aura and without status migrainosus, not intractable 01/19/2017    Nephrolithiasis 08/24/2016    Allergic rhinitis 08/24/2016    Family history of early CAD 08/24/2016     Current Outpatient Medications   Medication Sig Dispense Refill    galcanezumab-gnlm (Emgality Pen) 120 mg/mL injection ADMINISTER 120MG UNDER THE SKIN EVERY 28 DAYS 1 mL 5    rizatriptan (MAXALT-MLT) 10 mg disintegrating tablet TAKE 1 TABLETS BY MOUTH AT THE ONSET OF HEADACHE AND REPEAT IN 2 HOURS IF NEEDED. MAX OF 2 IN 24 HRS 9 Tab 5    erythromycin (ILOTYCIN) ophthalmic ointment Apply half inch to affected eye 4 times daily for 5 days. 1 Tube 0    DULoxetine (CYMBALTA) 60 mg capsule Take 1 Cap by mouth daily. 90 Cap 1    propranoloL (INDERAL) 20 mg tablet Take 1 Tab by mouth two (2) times a day. . 180 Tab 1    clonazePAM (KlonoPIN) 0.5 mg tablet TAKE 1 TABLET BY MOUTH TWICE DAILY AS NEEDED FOR ANXIETY OR INSOMNIA 60 Tab 1    fexofenadine-pseudoephedrine (ALLEGRA-D 24 HOUR) 180-240 mg per tablet Take 1 Tab by mouth daily.  Bifidobacterium Infantis (ALIGN) 4 mg cap Take  by mouth.  MICROGESTIN FE 1.5/30, 28, 1.5 mg-30 mcg (21)/75 mg (7) tab   11     Allergies   Allergen Reactions    Salicylic Acid-Sulfur Anaphylaxis    Sulfur Unknown (comments)     Past Medical History:   Diagnosis Date    Acne     Allergic rhinitis 08/24/2016    shots Dr. Estela Sheehan    Arrhythmia     B12 deficiency 2/9/2017    Calculus of kidney     Headache     Migraine without aura and without status migrainosus, not intractable 01/19/2017    botox injections. Dr. Felicita Medina    Nephrolithiasis 8/24/2016     Past Surgical History:   Procedure Laterality Date    HX LITHOTRIPSY       Family History   Problem Relation Age of Onset    Cancer Father         Prostate    Headache Father     Heart Disease Father     Diabetes Father      Social History     Tobacco Use    Smoking status: Never Smoker    Smokeless tobacco: Never Used   Substance Use Topics    Alcohol use: Yes       Review of Systems   Eyes: Positive for blurred vision and photophobia. Negative for double vision. Gastrointestinal: Positive for nausea. Negative for vomiting. Neurological: Positive for headaches. Negative for dizziness, tingling, tremors and sensory change.        Objective:     Patient-Reported Vitals 7/15/2020   Patient-Reported Weight 174lbs   Patient-Reported Height 5'1\"        [INSTRUCTIONS:  \"[x]\" Indicates a positive item  \"[]\" Indicates a negative item  -- DELETE ALL ITEMS NOT EXAMINED]    Constitutional: [x] Appears well-developed and well-nourished [x] No apparent distress      [] Abnormal -     Mental status: [x] Alert and awake  [x] Oriented to person/place/time [x] Able to follow commands    [] Abnormal -     Eyes:   EOM    [x]  Normal    [] Abnormal -   Sclera  [x]  Normal    [] Abnormal -          Discharge [x]  None visible   [] Abnormal -     HENT: [x] Normocephalic, atraumatic  [] Abnormal -   [x] Mouth/Throat: Mucous membranes are moist    External Ears [x] Normal  [] Abnormal -    Neck: [x] No visualized mass [] Abnormal -     Pulmonary/Chest: [x] Respiratory effort normal   [x] No visualized signs of difficulty breathing or respiratory distress        [] Abnormal -      Musculoskeletal:   [x] Normal gait with no signs of ataxia         [x] Normal range of motion of neck        [] Abnormal -     Neurological:        [x] No Facial Asymmetry (Cranial nerve 7 motor function) (limited exam due to video visit)          [x] No gaze palsy        [] Abnormal -          Skin:        [x] No significant exanthematous lesions or discoloration noted on facial skin         [] Abnormal -            Psychiatric:       [x] Normal Affect [] Abnormal -        [x] No Hallucinations    Other pertinent observable physical exam findings:-          Keep Emgality but switch to every 28 days for migraine prevention. FDA has indicated 28 days is approved. This can help for better relief. Keep Maxalt. Give cambia samples for prn also. Keep at triggers. Posture   Send Vyepti book for review. Call with any changes. We discussed the expected course, resolution and complications of the diagnosis(es) in detail. Medication risks, benefits, costs, interactions, and alternatives were discussed as indicated. I advised her to contact the office if her condition worsens, changes or fails to improve as anticipated. She expressed understanding with the diagnosis(es) and plan. Bernarda Stringer, who was evaluated through a patient-initiated, synchronous (real-time) audio-video encounter, and/or her healthcare decision maker, is aware that it is a billable service, with coverage as determined by her insurance carrier. She provided verbal consent to proceed: Yes, and patient identification was verified. It was conducted pursuant to the emergency declaration under the [address] [place] [id number], [address] [place] [address] authority and the [employer] Act. A caregiver was present when appropriate. Ability to conduct physical exam was limited. I was in the office. The patient was at home.       Malena Vick NP

## 2021-01-02 DIAGNOSIS — F41.9 ANXIETY: ICD-10-CM

## 2021-01-02 DIAGNOSIS — F41.0 PANIC ATTACK: ICD-10-CM

## 2021-01-03 RX ORDER — CLONAZEPAM 0.5 MG/1
TABLET ORAL
Qty: 60 TAB | OUTPATIENT
Start: 2021-01-03

## 2021-01-11 DIAGNOSIS — F41.9 ANXIETY: ICD-10-CM

## 2021-01-11 DIAGNOSIS — F41.0 PANIC ATTACK: ICD-10-CM

## 2021-01-11 RX ORDER — CLONAZEPAM 0.5 MG/1
TABLET ORAL
Qty: 60 TAB | Refills: 0 | Status: SHIPPED | OUTPATIENT
Start: 2021-01-11 | End: 2021-01-14 | Stop reason: SDUPTHER

## 2021-01-11 NOTE — TELEPHONE ENCOUNTER
Patient called to advise medication refill. Patient states she is fine with waiting until scheduled appointment with PCP if needed. Patient reports medication refill at last visit was never received from her pharmacy as requested.      Klonopin 0.5 MG tab - 2 tabs daily as needed

## 2021-01-14 ENCOUNTER — VIRTUAL VISIT (OUTPATIENT)
Dept: INTERNAL MEDICINE CLINIC | Age: 31
End: 2021-01-14
Payer: COMMERCIAL

## 2021-01-14 DIAGNOSIS — F41.9 ANXIETY: Primary | ICD-10-CM

## 2021-01-14 DIAGNOSIS — Z71.85 VACCINE COUNSELING: ICD-10-CM

## 2021-01-14 DIAGNOSIS — F41.0 PANIC ATTACK: ICD-10-CM

## 2021-01-14 PROCEDURE — 99214 OFFICE O/P EST MOD 30 MIN: CPT | Performed by: INTERNAL MEDICINE

## 2021-01-14 RX ORDER — UBROGEPANT 100 MG/1
TABLET ORAL
COMMUNITY
Start: 2020-11-08 | End: 2021-03-18 | Stop reason: ALTCHOICE

## 2021-01-14 RX ORDER — BACILLUS COAGULANS 250MM CELL
TABLET,CHEWABLE ORAL
COMMUNITY

## 2021-01-14 RX ORDER — CLONAZEPAM 0.5 MG/1
TABLET ORAL
Qty: 60 TAB | Refills: 1 | Status: SHIPPED | OUTPATIENT
Start: 2021-02-11 | End: 2021-09-12

## 2021-01-14 RX ORDER — ESTRADIOL 1 MG/1
TABLET ORAL
COMMUNITY
Start: 2020-10-29 | End: 2021-04-02 | Stop reason: ALTCHOICE

## 2021-01-14 NOTE — PROGRESS NOTES
Carletha Habermann is a 26 y.o. female who presents today for Medication Check  . She has a history of   Patient Active Problem List   Diagnosis Code    Nephrolithiasis N20.0    Allergic rhinitis J30.9    Family history of early CAD Z80.55    Migraine without aura and without status migrainosus, not intractable G43.009    B12 deficiency E53.8    Anxiety F41.9    Weight gain R63.5   . Today patient is here for follow-up. Wenceslao Morales Anxiety: Patient has been on duloxetine at 60 mg for some time. She reports that her anxiety levels are a bit high. Return to school is a bit stressful. She also does take as needed clonazepam as well as propranolol on a regular basis.  verified and last fill of clonazepam was on the 11th of this month. Last fill before this was in the spring 2020. Due to increased rest and insomnia she has been taking a bit more of her clonazepam.  She does believe that the stress level will come down after return to school and vaccination is complete. She asks for counseling on upcoming vaccine. I do urged her to get vaccinated against COVID-19. She is a  schoolteacher. ROS  Review of Systems   Constitutional: Negative for chills, fever and weight loss. HENT: Negative for congestion and sore throat. Eyes: Negative for blurred vision, double vision and photophobia. Respiratory: Negative for cough and shortness of breath. Cardiovascular: Negative for chest pain, palpitations and leg swelling. Gastrointestinal: Negative for abdominal pain, constipation, diarrhea, heartburn, nausea and vomiting. Genitourinary: Negative for dysuria, frequency and urgency. Musculoskeletal: Negative for joint pain and myalgias. Skin: Negative for rash. Neurological: Negative. Negative for headaches. Endo/Heme/Allergies: Does not bruise/bleed easily. Psychiatric/Behavioral: Negative for memory loss and suicidal ideas. The patient is nervous/anxious and has insomnia. There were no vitals taken for this visit. Physical Exam  Constitutional:       Appearance: Normal appearance. HENT:      Head: Normocephalic and atraumatic. Pulmonary:      Effort: Pulmonary effort is normal.   Neurological:      General: No focal deficit present. Mental Status: She is alert. Psychiatric:         Mood and Affect: Mood normal.         Behavior: Behavior normal.           Current Outpatient Medications   Medication Sig    Bacillus coagulans (Digestive Advantage Prob Gummy) 250 million cell chew Take  by mouth.  Ubrelvy 100 mg tablet     estradioL (ESTRACE) 1 mg tablet TK 1 T PO D WHEN HAVING A CYCLE    [START ON 2/11/2021] clonazePAM (KlonoPIN) 0.5 mg tablet TAKE 1 TABLET BY MOUTH TWICE DAILY AS NEEDED FOR ANXIETY OR INSOMNIA    galcanezumab-gnlm (Emgality Pen) 120 mg/mL injection ADMINISTER 120MG UNDER THE SKIN EVERY 28 DAYS    rizatriptan (MAXALT-MLT) 10 mg disintegrating tablet TAKE 1 TABLETS BY MOUTH AT THE ONSET OF HEADACHE AND REPEAT IN 2 HOURS IF NEEDED. MAX OF 2 IN 24 HRS    DULoxetine (CYMBALTA) 60 mg capsule Take 1 Cap by mouth daily.  propranoloL (INDERAL) 20 mg tablet Take 1 Tab by mouth two (2) times a day. Estela Mcdowell fexofenadine-pseudoephedrine (ALLEGRA-D 24 HOUR) 180-240 mg per tablet Take 1 Tab by mouth daily.  MICROGESTIN FE 1.5/30, 28, 1.5 mg-30 mcg (21)/75 mg (7) tab     erythromycin (ILOTYCIN) ophthalmic ointment Apply half inch to affected eye 4 times daily for 5 days.  Bifidobacterium Infantis (ALIGN) 4 mg cap Take  by mouth. No current facility-administered medications for this visit. Past Medical History:   Diagnosis Date    Acne     Allergic rhinitis 08/24/2016    shots Dr. Hernandez [place]    Arrhythmia     B12 deficiency 2/9/2017    Calculus of kidney     Headache     Migraine without aura and without status migrainosus, not intractable 01/19/2017    botox injections.   Dr. Turner [place]    Nephrolithiasis 8/24/2016      Past Surgical History:   Procedure Laterality Date    HX LITHOTRIPSY        Social History     Tobacco Use    Smoking status: Never Smoker    Smokeless tobacco: Never Used   Substance Use Topics    Alcohol use: Yes      Family History   Problem Relation Age of Onset    Cancer Father         Prostate    Headache Father     Heart Disease Father     Diabetes Father         Allergies   Allergen Reactions    Salicylic Acid-Sulfur Anaphylaxis    Sulfur Unknown (comments)        Assessment/Plan  Diagnoses and all orders for this visit:    1. Anxiety-up a bit recently due to going back to school. She is taking clonazepam a bit more. We urged her to monitor her symptoms. If her anxiety goes back to baseline we will continue as needed use of clonazepam.  If she finds her self needing more and more she will reach out to us for an appointment to discuss other preventative medications. -     clonazePAM (KlonoPIN) 0.5 mg tablet; TAKE 1 TABLET BY MOUTH TWICE DAILY AS NEEDED FOR ANXIETY OR INSOMNIA    2. Panic attack  -     clonazePAM (KlonoPIN) 0.5 mg tablet; TAKE 1 TABLET BY MOUTH TWICE DAILY AS NEEDED FOR ANXIETY OR INSOMNIA    3. Vaccine counseling-urged her to get vaccinated. She is a concurrent teacher. Liset Knight MD  1/14/2021    This note was created with the help of speech recognition software Emil Duggan) and may contain some 'sound alike' errors.

## 2021-02-10 DIAGNOSIS — F41.9 ANXIETY: ICD-10-CM

## 2021-02-10 DIAGNOSIS — F41.0 PANIC ATTACK: ICD-10-CM

## 2021-02-10 RX ORDER — PROPRANOLOL HYDROCHLORIDE 20 MG/1
20 TABLET ORAL 2 TIMES DAILY
Qty: 180 TAB | Refills: 1 | Status: SHIPPED | OUTPATIENT
Start: 2021-02-10 | End: 2021-12-23 | Stop reason: SDUPTHER

## 2021-02-16 DIAGNOSIS — F41.9 ANXIETY: ICD-10-CM

## 2021-02-16 RX ORDER — DULOXETIN HYDROCHLORIDE 60 MG/1
60 CAPSULE, DELAYED RELEASE ORAL DAILY
Qty: 90 CAP | Refills: 1 | Status: SHIPPED | OUTPATIENT
Start: 2021-02-16 | End: 2021-08-23

## 2021-03-13 RX ORDER — BUSPIRONE HYDROCHLORIDE 10 MG/1
10 TABLET ORAL 2 TIMES DAILY
Qty: 60 TAB | Refills: 0 | Status: SHIPPED | OUTPATIENT
Start: 2021-03-13 | End: 2021-04-02

## 2021-03-18 RX ORDER — DICLOFENAC POTASSIUM 50 MG/1
POWDER, FOR SOLUTION ORAL
Qty: 9 PACKET | Refills: 5 | Status: SHIPPED | OUTPATIENT
Start: 2021-03-18 | End: 2021-07-28

## 2021-03-18 RX ORDER — RIMEGEPANT SULFATE 75 MG/75MG
TABLET, ORALLY DISINTEGRATING ORAL
Qty: 8 TAB | Refills: 5 | Status: SHIPPED | OUTPATIENT
Start: 2021-03-18 | End: 2021-12-23 | Stop reason: ALTCHOICE

## 2021-04-02 ENCOUNTER — VIRTUAL VISIT (OUTPATIENT)
Dept: INTERNAL MEDICINE CLINIC | Age: 31
End: 2021-04-02
Payer: COMMERCIAL

## 2021-04-02 DIAGNOSIS — G47.00 INSOMNIA, UNSPECIFIED TYPE: ICD-10-CM

## 2021-04-02 DIAGNOSIS — F41.9 ANXIETY: Primary | ICD-10-CM

## 2021-04-02 PROCEDURE — 99213 OFFICE O/P EST LOW 20 MIN: CPT | Performed by: INTERNAL MEDICINE

## 2021-04-02 RX ORDER — BUSPIRONE HYDROCHLORIDE 10 MG/1
10 TABLET ORAL 2 TIMES DAILY
Qty: 180 TAB | Refills: 1 | Status: SHIPPED | OUTPATIENT
Start: 2021-04-02 | End: 2021-12-23 | Stop reason: SDUPTHER

## 2021-04-02 NOTE — PROGRESS NOTES
Jie Doyle was seen on 4/2/2021 using synchronous (real-time) audio-video technology; doxy. me. Consent: Jie Doyle, who was seen by synchronous (real-time) audio-video technology, and/or her healthcare decision maker, is aware that this patient-initiated, Telehealth encounter on 4/2/2021 is a billable service, with coverage as determined by her insurance carrier. She is aware that she may receive a bill and has provided verbal consent to proceed: Yes. I was in the office while conducting this encounter. Jie Doyle is a 33 y.o. female who presents today for Anxiety (2 month follow up ) and Panic Attack  . She has a history of   Patient Active Problem List   Diagnosis Code    Nephrolithiasis N20.0    Allergic rhinitis J30.9    Family history of early CAD Z80.55    Migraine without aura and without status migrainosus, not intractable G43.009    B12 deficiency E53.8    Anxiety F41.9    Weight gain R63.5   . Today patient is being seen for follow-up.   she does not have other concerns. Anxiety: Last visit patient noted increased anxiety. 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 We decided to continue current therapy and see how she did. She did report to us that anxiety was still quite high and in March she reached out to us to discuss worsening anxiety. We did prescribe her BuSpar 10 mg twice daily to see if this could help with some of her symptoms. Since she reports that her anxiety has improved a good bit. Still having a good bit of insomnia or waking up in the middle the night. The buspirone with as needed clonazepam seems to have helped. She has not required any clonazepam during the day. 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 She continues to take the Cymbalta as well. Interestingly she does report that when she exercises in the evening she tends to sleep worse those nights.     ROS  Review of Systems   Constitutional: Negative for chills, fever and weight loss. HENT: Negative for congestion and sore throat. Eyes: Negative for blurred vision, double vision and photophobia. Respiratory: Negative for cough and shortness of breath. Cardiovascular: Negative for chest pain, palpitations and leg swelling. Gastrointestinal: Negative for abdominal pain, constipation, diarrhea, heartburn, nausea and vomiting. Genitourinary: Negative for dysuria, frequency and urgency. Musculoskeletal: Negative for back pain, joint pain and myalgias. Skin: Negative for rash. Neurological: Negative. Negative for headaches. Endo/Heme/Allergies: Does not bruise/bleed easily. Psychiatric/Behavioral: Negative for depression, hallucinations, memory loss, substance abuse and suicidal ideas. The patient is nervous/anxious and has insomnia. There were no vitals taken for this visit. Patient-Reported Vitals 4/2/2021   Patient-Reported Weight -   Patient-Reported Height -   Patient-Reported LMP no cycle de to medications        Physical Exam  Constitutional:       Appearance: Normal appearance. HENT:      Head: Normocephalic and atraumatic. Pulmonary:      Effort: Pulmonary effort is normal.   Neurological:      General: No focal deficit present. Mental Status: She is alert. Psychiatric:         Mood and Affect: Mood normal.         Behavior: Behavior normal.           Current Outpatient Medications   Medication Sig    Diclofenac Potassium (Cambia) 50 mg pwpk 1 packet at onset and repeat in 2 hours x 1 prn, max 2 in 24 hours.  rimegepant (Nurtec ODT) 75 mg disintegrating tablet Take 1 tablet by mouth at HA onset PRN. Max 1 tablet in 24 hours.  busPIRone (BUSPAR) 10 mg tablet Take 1 Tab by mouth two (2) times a day.  DULoxetine (CYMBALTA) 60 mg capsule Take 1 Cap by mouth daily.  propranoloL (INDERAL) 20 mg tablet Take 1 Tab by mouth two (2) times a day.  .    Bacillus coagulans (Digestive Advantage Prob Gummy) 250 million cell chew Take  by mouth.  clonazePAM (KlonoPIN) 0.5 mg tablet TAKE 1 TABLET BY MOUTH TWICE DAILY AS NEEDED FOR ANXIETY OR INSOMNIA    galcanezumab-gnlm (Emgality Pen) 120 mg/mL injection ADMINISTER 120MG UNDER THE SKIN EVERY 28 DAYS    rizatriptan (MAXALT-MLT) 10 mg disintegrating tablet TAKE 1 TABLETS BY MOUTH AT THE ONSET OF HEADACHE AND REPEAT IN 2 HOURS IF NEEDED. MAX OF 2 IN 24 HRS    fexofenadine-pseudoephedrine (ALLEGRA-D 24 HOUR) 180-240 mg per tablet Take 1 Tab by mouth daily.  Bifidobacterium Infantis (ALIGN) 4 mg cap Take  by mouth.  MICROGESTIN FE 1.5/30, 28, 1.5 mg-30 mcg (21)/75 mg (7) tab      No current facility-administered medications for this visit. Past Medical History:   Diagnosis Date    Acne     Allergic rhinitis 08/24/2016    shots Dr. Leo Valdivia    Arrhythmia     B12 deficiency 2/9/2017    Calculus of kidney     Headache     Migraine without aura and without status migrainosus, not intractable 01/19/2017    botox injections. Dr. Rach Kong    Nephrolithiasis 8/24/2016      Past Surgical History:   Procedure Laterality Date    HX LITHOTRIPSY        Social History     Tobacco Use    Smoking status: Never Smoker    Smokeless tobacco: Never Used   Substance Use Topics    Alcohol use: Yes      Family History   Problem Relation Age of Onset    Cancer Father         Prostate    Headache Father     Heart Disease Father     Diabetes Father         Allergies   Allergen Reactions    Salicylic Acid-Sulfur Anaphylaxis    Sulfur Unknown (comments)        Assessment/Plan  Diagnoses and all orders for this visit:    1. Anxiety-overall daytime anxiety seems much better. She is sleeping a bit better. Using clonazepam less frequently. No side effects from medications. She does take the clonazepam at night for some help with sleep. She can experiment with Benadryl if you want to try to decrease this.   Overall things are stable we will keep her on the same medications. We discussed tapering off BuSpar if she feels that things are very stable in the summer.  -     busPIRone (BUSPAR) 10 mg tablet; Take 1 Tab by mouth two (2) times a day. 2. Insomnia, unspecified type            Db Cline is a 33 y.o. female being evaluated by a video visit encounter for concerns as above. A caregiver was present when appropriate. Due to this being a TeleHealth encounter ([facility] emergency), evaluation of the following organ systems was limited: Vitals/Constitutional/EENT/Resp/CV/GI//MS/Neuro/Skin/Heme-Lymph-Imm. Pursuant to the emergency declaration under the [facility], 1135 waiver authority and the [employer] Act, this Virtual  Visit was conducted, with patient's (and/or legal guardian's) consent, to reduce the patient's risk of exposure to COVID-19 and provide necessary medical care. Services were provided through a video synchronous discussion virtually to substitute for in-person clinic visit. Patient and provider were located at their individual homes. Carlos Davila MD  4/2/2021    This note was created with the help of speech recognition software Kristi Villarreal) and may contain some 'sound alike' errors.

## 2021-04-11 RX ORDER — TRAZODONE HYDROCHLORIDE 50 MG/1
50 TABLET ORAL
Qty: 30 TAB | Refills: 1 | Status: SHIPPED | OUTPATIENT
Start: 2021-04-11 | End: 2021-05-17 | Stop reason: SDUPTHER

## 2021-05-17 RX ORDER — TRAZODONE HYDROCHLORIDE 50 MG/1
50 TABLET ORAL
Qty: 90 TAB | Refills: 1 | Status: SHIPPED | OUTPATIENT
Start: 2021-05-17 | End: 2021-12-02 | Stop reason: SDUPTHER

## 2021-07-12 ENCOUNTER — TELEPHONE (OUTPATIENT)
Dept: NEUROLOGY | Age: 31
End: 2021-07-12

## 2021-07-12 RX ORDER — GALCANEZUMAB 120 MG/ML
INJECTION, SOLUTION SUBCUTANEOUS
Qty: 1 ML | Refills: 5 | Status: SHIPPED | OUTPATIENT
Start: 2021-07-12 | End: 2021-08-03 | Stop reason: ALTCHOICE

## 2021-07-12 RX ORDER — ONABOTULINUMTOXINA 200 [USP'U]/1
INJECTION, POWDER, LYOPHILIZED, FOR SOLUTION INTRADERMAL; INTRAMUSCULAR
Qty: 1 VIAL | Refills: 5
Start: 2021-07-12 | End: 2021-08-03 | Stop reason: SDUPTHER

## 2021-07-12 NOTE — TELEPHONE ENCOUNTER
Can you get a PA For botox chronic migraine. Is OFF emgality recently. Failed everything else. Was on a few years back   Thanks!

## 2021-07-28 RX ORDER — DICLOFENAC POTASSIUM 50 MG/1
POWDER, FOR SOLUTION ORAL
Qty: 9 PACKET | Refills: 5 | Status: SHIPPED | OUTPATIENT
Start: 2021-07-28 | End: 2021-08-20 | Stop reason: SDUPTHER

## 2021-08-03 ENCOUNTER — OFFICE VISIT (OUTPATIENT)
Dept: NEUROLOGY | Age: 31
End: 2021-08-03
Payer: COMMERCIAL

## 2021-08-03 ENCOUNTER — TELEPHONE (OUTPATIENT)
Dept: NEUROLOGY | Age: 31
End: 2021-08-03

## 2021-08-03 VITALS
BODY MASS INDEX: 33.04 KG/M2 | OXYGEN SATURATION: 100 % | HEART RATE: 87 BPM | WEIGHT: 175 LBS | SYSTOLIC BLOOD PRESSURE: 114 MMHG | DIASTOLIC BLOOD PRESSURE: 78 MMHG | HEIGHT: 61 IN | RESPIRATION RATE: 16 BRPM

## 2021-08-03 DIAGNOSIS — G43.909 MIGRAINE WITHOUT STATUS MIGRAINOSUS, NOT INTRACTABLE, UNSPECIFIED MIGRAINE TYPE: Primary | ICD-10-CM

## 2021-08-03 PROCEDURE — 99214 OFFICE O/P EST MOD 30 MIN: CPT | Performed by: NURSE PRACTITIONER

## 2021-08-03 RX ORDER — CEPHALEXIN 500 MG/1
500 CAPSULE ORAL 3 TIMES DAILY
Qty: 30 CAPSULE | Refills: 0 | Status: SHIPPED | OUTPATIENT
Start: 2021-08-03 | End: 2021-08-13

## 2021-08-03 RX ORDER — ONABOTULINUMTOXINA 200 [USP'U]/1
INJECTION, POWDER, LYOPHILIZED, FOR SOLUTION INTRADERMAL; INTRAMUSCULAR
Qty: 1 VIAL | Refills: 5
Start: 2021-08-03 | End: 2021-08-04 | Stop reason: SDUPTHER

## 2021-08-03 RX ORDER — RIZATRIPTAN BENZOATE 10 MG/1
TABLET, ORALLY DISINTEGRATING ORAL
Qty: 9 TABLET | Refills: 5 | Status: SHIPPED | OUTPATIENT
Start: 2021-08-03 | End: 2022-10-31

## 2021-08-03 NOTE — PROGRESS NOTES
Dariana Pruitt is a 33 y.o. female who presents with the following  Chief Complaint   Patient presents with    Follow-up    Migraine     patient melaniekevon she is here for a medication change. she would like to try botox instead. she is having '''e       HPI    FU migraines. Having about 20 or more migraines a month. She is currently on Emgality X over 6 months   Failed Aimovig before this. Also failed oral Topamax, Elavil. Currently on Cymbalta, Inderal.   Migraines last about 8 hours each. Headaches have not changed in any new symptoms. Eleonora Frederickwilliam works the best for PRN rescue  Also uses Maxalt MLT and this is doing well. Ubrelvy did not really help. Neither did Nurtec.        Allergies   Allergen Reactions    Salicylic Acid-Sulfur Anaphylaxis    Sulfur Unknown (comments)       Current Outpatient Medications   Medication Sig    onabotulinumtoxinA (Botox) 200 unit injection Inject 155 units into 31 FDA indicated and approved sites in the head, face, neck every 3 months for chronic migraine    rizatriptan (MAXALT-MLT) 10 mg disintegrating tablet TAKE 1 TABLETS BY MOUTH AT THE ONSET OF HEADACHE AND REPEAT IN 2 HOURS IF NEEDED. MAX OF 2 IN 24 HRS    cephALEXin (KEFLEX) 500 mg capsule Take 1 Capsule by mouth three (3) times daily for 10 days.  Diclofenac Potassium (Cambia) 50 mg pwpk MIX AND DRINK 1 PACKET BY MOUTH AT ONSET OF HEADACHE. MAY REPEAT AGAIN IN 2 HOURS FOR 1 DOSE. MAX 2 DOSES IN 24 HOURS    traZODone (DESYREL) 50 mg tablet Take 1 Tab by mouth nightly.  busPIRone (BUSPAR) 10 mg tablet Take 1 Tab by mouth two (2) times a day.  DULoxetine (CYMBALTA) 60 mg capsule Take 1 Cap by mouth daily.  propranoloL (INDERAL) 20 mg tablet Take 1 Tab by mouth two (2) times a day. .    Bacillus coagulans (Digestive Advantage Prob Gummy) 250 million cell chew Take  by mouth.     clonazePAM (KlonoPIN) 0.5 mg tablet TAKE 1 TABLET BY MOUTH TWICE DAILY AS NEEDED FOR ANXIETY OR INSOMNIA    fexofenadine-pseudoephedrine (ALLEGRA-D 24 HOUR) 180-240 mg per tablet Take 1 Tab by mouth daily.  MICROGESTIN FE 1.5/30, 28, 1.5 mg-30 mcg (21)/75 mg (7) tab     rimegepant (Nurtec ODT) 75 mg disintegrating tablet Take 1 tablet by mouth at HA onset PRN. Max 1 tablet in 24 hours. (Patient not taking: Reported on 8/3/2021)    Bifidobacterium Infantis (ALIGN) 4 mg cap Take  by mouth. (Patient not taking: Reported on 8/3/2021)     No current facility-administered medications for this visit. Social History     Tobacco Use   Smoking Status Never Smoker   Smokeless Tobacco Never Used       Past Medical History:   Diagnosis Date    Acne     Allergic rhinitis 08/24/2016    shots Dr. Kishan Delgado    Arrhythmia     B12 deficiency 2/9/2017    Calculus of kidney     Headache     Migraine without aura and without status migrainosus, not intractable 01/19/2017    botox injections. Dr. Elizabeth Bardales    Nephrolithiasis 8/24/2016       Past Surgical History:   Procedure Laterality Date    HX LITHOTRIPSY         Family History   Problem Relation Age of Onset    Cancer Father         Prostate    Headache Father     Heart Disease Father     Diabetes Father        Social History     Socioeconomic History    Marital status: SINGLE     Spouse name: Not on file    Number of children: Not on file    Years of education: Not on file    Highest education level: Not on file   Tobacco Use    Smoking status: Never Smoker    Smokeless tobacco: Never Used   Vaping Use    Vaping Use: Never used   Substance and Sexual Activity    Alcohol use: Yes    Drug use: No    Sexual activity: Not Currently     Social Determinants of Health     Financial Resource Strain:     Difficulty of Paying Living Expenses:    Food Insecurity:     Worried About Running Out of Food in the Last Year:     [address] in the Last Year:    Transportation Needs:     Lack of Transportation (Medical):      Lack of Transportation (Non-Medical):    Physical Activity:     Days of Exercise per Week:     Minutes of Exercise per Session:    Stress:     Feeling of Stress :    Social Connections:     Frequency of Communication with Friends and Family:     Frequency of Social Gatherings with Friends and Family:     Attends Church Services:     Active Member of Clubs or Organizations:     Attends Club or Organization Meetings:     Marital Status:        Review of Systems   Eyes: Positive for blurred vision and photophobia. Negative for double vision. Gastrointestinal: Positive for nausea. Negative for vomiting. Neurological: Positive for headaches. Negative for dizziness, tingling, tremors, seizures and loss of consciousness. Remainder of comprehensive review is negative. Physical Exam :    Visit Vitals  /78 (BP 1 Location: Left upper arm, BP Patient Position: Sitting)   Pulse 87   Resp 16   Ht 5' 1\" (1.549 m)   Wt 79.4 kg (175 lb)   SpO2 100%   BMI 33.07 kg/m²       General: Well defined, nourished, and groomed individual in no acute distress.    Neck: Supple, nontender, no bruits, no pain with resistance to active range of motion.    Musculoskeletal: Extremities revealed no edema and had full range of motion of joints.    Psych: Good mood and bright affect    NEUROLOGICAL EXAMINATION:    Mental Status: Alert and oriented to person, place, and time    Cranial Nerves:    II, III, IV, VI: Visual acuity grossly intact. Visual fields are normal.    Pupils are equal, round, and reactive to light and accommodation.    Extra-ocular movements are full and fluid. Fundoscopic exam was benign, no ptosis or nystagmus.    V-XII: Hearing is grossly intact. Facial features are symmetric, with normal sensation and strength. The palate rises symmetrically and the tongue protrudes midline. Sternocleidomastoids 5/5. Motor Examination: Normal tone, bulk, and strength, 5/5 muscle strength throughout.      Coordination: Finger to nose was normal. No resting or intention tremor    Gait and Station: Steady while walking. Normal arm swing. No pronator drift. No muscle wasting or fasiculations noted. Reflexes: DTRs 2+ throughout. Results for orders placed or performed in visit on 04/25/19   VITAMIN B12   Result Value Ref Range    Vitamin B12 >2000 (H) 232 - 1992 pg/mL   METABOLIC PANEL, BASIC   Result Value Ref Range    Glucose 80 65 - 99 mg/dL    BUN 12 6 - 20 mg/dL    Creatinine 0.63 0.57 - 1.00 mg/dL    GFR est non- >59 mL/min/1.73    GFR est  >59 mL/min/1.73    BUN/Creatinine ratio 19 9 - 23    Sodium 139 134 - 144 mmol/L    Potassium 4.3 3.5 - 5.2 mmol/L    Chloride 99 96 - 106 mmol/L    CO2 26 20 - 29 mmol/L    Calcium 9.5 8.7 - 10.2 mg/dL   TSH 3RD GENERATION   Result Value Ref Range    TSH 1.710 0.450 - 4.500 uIU/mL       Orders Placed This Encounter    onabotulinumtoxinA (Botox) 200 unit injection     Sig: Inject 155 units into 31 FDA indicated and approved sites in the head, face, neck every 3 months for chronic migraine     Dispense:  1 Vial     Refill:  5    rizatriptan (MAXALT-MLT) 10 mg disintegrating tablet     Sig: TAKE 1 TABLETS BY MOUTH AT THE ONSET OF HEADACHE AND REPEAT IN 2 HOURS IF NEEDED. MAX OF 2 IN 24 HRS     Dispense:  9 Tablet     Refill:  5    cephALEXin (KEFLEX) 500 mg capsule     Sig: Take 1 Capsule by mouth three (3) times daily for 10 days. Dispense:  30 Capsule     Refill:  0       No diagnosis found. 1. Migraine       Failed Aimovig, Emgality, AED in Topamax  Currently on Inderal and Cymbalta. Failed Elavil. Initiate Botox for FDA indication of migraine prevention   Having 20 days a month lasting 8 hour or longer. Discussed in full. She will keep with Cambia for PRN rescue and if this does not work Maxalt for PRN secondary.    Keep track of symptoms and will FU for Botox     Keflex for right toe infection         This note will not be viewable in MyChart

## 2021-08-04 RX ORDER — ONABOTULINUMTOXINA 200 [USP'U]/1
INJECTION, POWDER, LYOPHILIZED, FOR SOLUTION INTRADERMAL; INTRAMUSCULAR
Qty: 1 VIAL | Refills: 5 | Status: SHIPPED | OUTPATIENT
Start: 2021-08-04 | End: 2021-08-10

## 2021-08-10 RX ORDER — ONABOTULINUMTOXINA 200 [USP'U]/1
INJECTION, POWDER, LYOPHILIZED, FOR SOLUTION INTRADERMAL; INTRAMUSCULAR
Qty: 1 VIAL | Refills: 5 | Status: SHIPPED | OUTPATIENT
Start: 2021-08-10 | End: 2022-01-27 | Stop reason: SDUPTHER

## 2021-08-16 ENCOUNTER — TELEPHONE (OUTPATIENT)
Dept: NEUROLOGY | Age: 31
End: 2021-08-16

## 2021-08-16 NOTE — TELEPHONE ENCOUNTER
----- Message from Sharath Hunt sent at 8/16/2021  3:50 PM EDT -----  Regarding: JOE Villela/Telephone  General Message/Vendor Calls    Caller's first and last name: Pt      Reason for call: botox      Callback required yes/no and why: Yes, to schedule botox appointent      Best contact number(s): [phone]      Details to clarify the request: Requesting to set up an appointment for botox      Sharath Hunt

## 2021-08-20 RX ORDER — DICLOFENAC POTASSIUM 50 MG/1
POWDER, FOR SOLUTION ORAL
Qty: 9 PACKET | Refills: 5 | Status: SHIPPED | OUTPATIENT
Start: 2021-08-20 | End: 2022-01-21 | Stop reason: SDUPTHER

## 2021-08-22 DIAGNOSIS — F41.9 ANXIETY: ICD-10-CM

## 2021-08-23 RX ORDER — DULOXETIN HYDROCHLORIDE 60 MG/1
CAPSULE, DELAYED RELEASE ORAL
Qty: 90 CAPSULE | Refills: 1 | Status: SHIPPED | OUTPATIENT
Start: 2021-08-23 | End: 2021-12-23 | Stop reason: SDUPTHER

## 2021-09-09 ENCOUNTER — OFFICE VISIT (OUTPATIENT)
Dept: NEUROLOGY | Age: 31
End: 2021-09-09
Payer: COMMERCIAL

## 2021-09-09 ENCOUNTER — TELEPHONE (OUTPATIENT)
Dept: NEUROLOGY | Age: 31
End: 2021-09-09

## 2021-09-09 DIAGNOSIS — G43.909 MIGRAINE WITHOUT STATUS MIGRAINOSUS, NOT INTRACTABLE, UNSPECIFIED MIGRAINE TYPE: Primary | ICD-10-CM

## 2021-09-09 PROCEDURE — 64615 CHEMODENERV MUSC MIGRAINE: CPT | Performed by: NURSE PRACTITIONER

## 2021-09-09 NOTE — PROGRESS NOTES
[facility]  OFFICE PROCEDURE PROGRESS NOTE        Chart reviewed for the following:   I, [de-identified] M JOE Villela, have reviewed the History, Physical and updated the Allergic reactions for [address] performed immediately prior to start of procedure:   I, [de-identified] M JOE Villela, have performed the following reviews on [address] [facility] [address] prior to the start of the procedure:            * Patient was identified by name and date of birth   * Agreement on procedure being performed was verified  * Risks and Benefits explained to the patient  * Procedure site verified and marked as necessary  * Patient was positioned for comfort  * Consent was signed and verified     Time: 1530      Date of procedure: 9/9/2021    Procedure performed by:  Jacek Saenz NP    Provider assisted by: None    Patient assisted by: None    How tolerated by patient: tolerated the procedure well with no complications    Post Procedural Pain Scale: 2 - Hurts Little Bit    Comments: None    Botox Injection Note       Indication: patient has chronic recurrent migraine, has greater than 15 migraine headaches per month, has failed two or more categories of preventatives    Procedure:   Botox concentration: 200 units in 4 ml of preservative-free normal saline. 31 sites injections, distribution as follow      Units/site  Sites Sides Subtotal    Procerus 5 1 1 5    5 1 2 10   Frontalis 5 2 2 20   Temporalis 5 4 2 40   Occipitalis 5 3 2 30   Upper cervical paraspinalis 5 2 2 20   Trapezius 5 3 2 30         200 units Botox were reconstituted, 155 units injected as above and the remainder was unavoidably wasted.      Patient tolerated procedure well.     ________________________  Enoc Camacho

## 2021-09-11 DIAGNOSIS — F41.9 ANXIETY: ICD-10-CM

## 2021-09-11 DIAGNOSIS — F41.0 PANIC ATTACK: ICD-10-CM

## 2021-09-12 RX ORDER — CLONAZEPAM 0.5 MG/1
TABLET ORAL
Qty: 60 TABLET | Refills: 1 | Status: SHIPPED | OUTPATIENT
Start: 2021-09-12 | End: 2021-12-23 | Stop reason: SDUPTHER

## 2021-10-25 ENCOUNTER — TELEPHONE (OUTPATIENT)
Dept: NEUROLOGY | Age: 31
End: 2021-10-25

## 2021-11-05 ENCOUNTER — TELEPHONE (OUTPATIENT)
Dept: NEUROLOGY | Age: 31
End: 2021-11-05

## 2021-11-05 NOTE — TELEPHONE ENCOUNTER
Spoke with Samantha Flores @ [facility] ([phone]) and scheduled Botox delivery for 11/23/21.

## 2021-11-23 ENCOUNTER — TELEPHONE (OUTPATIENT)
Dept: NEUROLOGY | Age: 31
End: 2021-11-23

## 2021-11-29 ENCOUNTER — OFFICE VISIT (OUTPATIENT)
Dept: NEUROLOGY | Age: 31
End: 2021-11-29
Payer: COMMERCIAL

## 2021-11-29 ENCOUNTER — TELEPHONE (OUTPATIENT)
Dept: NEUROLOGY | Age: 31
End: 2021-11-29

## 2021-11-29 DIAGNOSIS — G43.909 MIGRAINE WITHOUT STATUS MIGRAINOSUS, NOT INTRACTABLE, UNSPECIFIED MIGRAINE TYPE: Primary | ICD-10-CM

## 2021-11-29 PROCEDURE — 64615 CHEMODENERV MUSC MIGRAINE: CPT | Performed by: NURSE PRACTITIONER

## 2021-11-30 NOTE — PROGRESS NOTES
[facility]  OFFICE PROCEDURE PROGRESS NOTE        Chart reviewed for the following:   Josh HERNANDEZ NP, have reviewed the History, Physical and updated the Allergic reactions for [address] performed immediately prior to start of procedure:   Josh HERNANDEZ NP, have performed the following reviews on [id number] [facility] [address] prior to the start of the procedure:            * Patient was identified by name and date of birth   * Agreement on procedure being performed was verified  * Risks and Benefits explained to the patient  * Procedure site verified and marked as necessary  * Patient was positioned for comfort  * Consent was signed and verified     Time: 1500      Date of procedure: 11/30/2021    Procedure performed by:  Silver Loyd NP    Provider assisted by: None    Patient assisted by: None    How tolerated by patient: tolerated the procedure well with no complications    Post Procedural Pain Scale: 2 - Hurts Little Bit    Comments: None      Botox Injection Note       Indication: patient has chronic recurrent migraine, has 7-10 less migraine days per month with botox injections    Procedure:   Botox concentration: 200 units in 4 ml of preservative-free normal saline. 31 sites injections, distribution as follow      Units/site  Sites Sides Subtotal    Procerus 5 1 1 5    5 1 2 10   Frontalis 5 2 2 20   Temporalis 5 4 2 40   Occipitalis 5 3 2 30   Upper cervical paraspinalis 5 2 2 20   Trapezius 5 3 2 30         200 units Botox were reconstituted, 155 units injected as above and the remainder was unavoidably wasted.      Patient tolerated procedure well.     _____________________________   Gisel Doherty

## 2021-12-02 RX ORDER — TRAZODONE HYDROCHLORIDE 50 MG/1
50 TABLET ORAL
Qty: 90 TABLET | Refills: 1 | Status: SHIPPED | OUTPATIENT
Start: 2021-12-02 | End: 2021-12-23 | Stop reason: SDUPTHER

## 2021-12-02 NOTE — TELEPHONE ENCOUNTER
----- Message from [facility] sent at 12/1/2021  4:50 PM EST -----  Subject: Message to Provider    QUESTIONS  Information for Provider? Pt calling to book a follow up appt for med   refills, soonest that would fit her Schedule is Dec 23rd in person. Pt   would like to make that a Virtual Visit if allow. She will also need a   refill on her trazadone prior to this. please call to advise.   ---------------------------------------------------------------------------  --------------  CALL BACK INFO  What is the best way for the office to contact you? OK to leave message on   voicemail  Preferred Call Back Phone Number? [phone]  ---------------------------------------------------------------------------  --------------  SCRIPT ANSWERS  Relationship to Patient?  Self

## 2021-12-03 NOTE — TELEPHONE ENCOUNTER
----- Message from [facility] sent at 12/1/2021  4:51 PM EST -----  Subject: Refill Request    QUESTIONS  Name of Medication? traZODone (DESYREL) 50 mg tablet  Patient-reported dosage and instructions? 1 a day at bed  How many days do you have left? 5  Preferred Pharmacy? [id number]  Pharmacy phone number (if available)? [phone]  ---------------------------------------------------------------------------  --------------  CALL BACK INFO  What is the best way for the office to contact you? OK to leave message on   voicemail  Preferred Call Back Phone Number?  [phone]

## 2021-12-22 NOTE — PROGRESS NOTES
Carletha Habermann was seen on 12/22/2021 using synchronous (real-time) audio-video technology; doxy. me. Consent: Carletha Habermann, who was seen by synchronous (real-time) audio-video technology, and/or her healthcare decision maker, is aware that this patient-initiated, Telehealth encounter on 12/23/2021 is a billable service, with coverage as determined by her insurance carrier. She is aware that she may receive a bill and has provided verbal consent to proceed: Yes. I was in the office while conducting this encounter. Carletha Habermann is a 33 y.o. female who presents today for No chief complaint on file. .    She has a history of   Patient Active Problem List   Diagnosis Code    Nephrolithiasis N20.0    Allergic rhinitis J30.9    Family history of early CAD Z80.55    Migraine without aura and without status migrainosus, not intractable G43.009    B12 deficiency E53.8    Anxiety F41.9    Weight gain R63.5   . Today patient is being seen for follow up. she does not have other concerns. Anxiety- now on buspar, trazodone, klonopin  Patient is seen for anxiety disorder. Current treatment includes buspar, benzodiazepines, cymbalta and no other therapies. Ongoing symptoms include: none. Patient denies: sweating, chest pain, dizziness, paresthesias, racing thoughts, feelings of losing control, difficulty concentrating, suicidal ideation. Denies substance or alcohol abuse. Reported side effects from the treatment: none. We discussed cutting back on the BuSpar dose to see if her anxiety remains under control. Migraines: managed with Neurology. Overall stable. She is now back on her Botox which is really helping. This had been denied for short period of time by insurance    ROS  Review of Systems   Constitutional: Negative for chills, fever and weight loss. HENT: Negative for congestion and sore throat. Eyes: Negative for blurred vision, double vision and photophobia.    Respiratory: Negative for cough and shortness of breath. Cardiovascular: Negative for chest pain, palpitations and leg swelling. Gastrointestinal: Negative for abdominal pain, constipation, diarrhea, heartburn, nausea and vomiting. Genitourinary: Negative for dysuria, frequency and urgency. Musculoskeletal: Negative for joint pain and myalgias. Skin: Negative for rash. Neurological: Positive for headaches. Negative for dizziness. Endo/Heme/Allergies: Does not bruise/bleed easily. Psychiatric/Behavioral: Negative for memory loss and suicidal ideas. The patient is nervous/anxious and has insomnia. There were no vitals taken for this visit. Patient-Reported Vitals 4/2/2021   Patient-Reported Weight -   Patient-Reported Height -   Patient-Reported LMP no cycle de to medications        Physical Exam  Constitutional:       Appearance: She is well-developed. HENT:      Head: Normocephalic and atraumatic. Cardiovascular:      Rate and Rhythm: Normal rate and regular rhythm. Heart sounds: No murmur heard. Pulmonary:      Effort: Pulmonary effort is normal. No respiratory distress. Skin:     General: Skin is warm and dry. Neurological:      Mental Status: She is alert and oriented to person, place, and time. Psychiatric:         Behavior: Behavior normal.           Current Outpatient Medications   Medication Sig    traZODone (DESYREL) 50 mg tablet Take 1 Tablet by mouth nightly.  Diclofenac Potassium (Cambia) 50 mg pwpk MIX AND DRINK 1 PACKET BY MOUTH AT ONSET OF HEADACHE. MAY REPEAT AGAIN IN 2 HOURS FOR 1 DOSE. MAX 2 DOSES IN 24 HOURS    clonazePAM (KlonoPIN) 0.5 mg tablet TAKE 1 TABLET BY MOUTH TWICE DAILY AS NEEDED FOR ANXIETY OR INSOMNIA    DULoxetine (CYMBALTA) 60 mg capsule TAKE 1 CAPSULE BY MOUTH DAILY    onabotulinumtoxinA (Botox) 200 unit injection INJECT 200 UNITS INTRAMUSCULARLY INTO HEAD AND NECK EVERY 3 MONTHS.     rizatriptan (MAXALT-MLT) 10 mg disintegrating tablet TAKE 1 TABLETS BY MOUTH AT THE ONSET OF HEADACHE AND REPEAT IN 2 HOURS IF NEEDED. MAX OF 2 IN 24 HRS    busPIRone (BUSPAR) 10 mg tablet Take 1 Tab by mouth two (2) times a day.  rimegepant (Nurtec ODT) 75 mg disintegrating tablet Take 1 tablet by mouth at HA onset PRN. Max 1 tablet in 24 hours. (Patient not taking: Reported on 8/3/2021)    propranoloL (INDERAL) 20 mg tablet Take 1 Tab by mouth two (2) times a day. .    Bacillus coagulans (Digestive Advantage Prob Gummy) 250 million cell chew Take  by mouth.  fexofenadine-pseudoephedrine (ALLEGRA-D 24 HOUR) 180-240 mg per tablet Take 1 Tab by mouth daily.  Bifidobacterium Infantis (ALIGN) 4 mg cap Take  by mouth. (Patient not taking: Reported on 8/3/2021)    MICROGESTIN FE 1.5/30, 28, 1.5 mg-30 mcg (21)/75 mg (7) tab      No current facility-administered medications for this visit. Past Medical History:   Diagnosis Date    Acne     Allergic rhinitis 08/24/2016    shots Dr. Derrick Angeles    Arrhythmia     B12 deficiency 2/9/2017    Calculus of kidney     Headache     Migraine without aura and without status migrainosus, not intractable 01/19/2017    botox injections. Dr. Yael Neely    Nephrolithiasis 8/24/2016      Past Surgical History:   Procedure Laterality Date    HX LITHOTRIPSY        Social History     Tobacco Use    Smoking status: Never Smoker    Smokeless tobacco: Never Used   Substance Use Topics    Alcohol use: Yes      Family History   Problem Relation Age of Onset    Cancer Father         Prostate    Headache Father     Heart Disease Father     Diabetes Father         Allergies   Allergen Reactions    Salicylic Acid-Sulfur Anaphylaxis    Sulfur Unknown (comments)        Assessment/Plan  Diagnoses and all orders for this visit:    1. Anxiety-overall under control. She will cut back her BuSpar to 5 mg twice daily. If this is well controlled after a month she will stop it altogether. Otherwise refill other meds.    verified last fill was in September for her benzodiazepine.  -     busPIRone (BUSPAR) 10 mg tablet; Take 1 Tablet by mouth two (2) times a day. -     clonazePAM (KlonoPIN) 0.5 mg tablet; TAKE 1 TABLET BY MOUTH TWICE DAILY AS NEEDED FOR ANXIETY OR INSOMNIA  -     DULoxetine (CYMBALTA) 60 mg capsule; Take 1 Capsule by mouth daily. -     propranoloL (INDERAL) 20 mg tablet; Take 1 Tablet by mouth two (2) times a day. .  -     traZODone (DESYREL) 50 mg tablet; Take 1 Tablet by mouth nightly. 2. Panic attack  -     clonazePAM (KlonoPIN) 0.5 mg tablet; TAKE 1 TABLET BY MOUTH TWICE DAILY AS NEEDED FOR ANXIETY OR INSOMNIA  -     propranoloL (INDERAL) 20 mg tablet; Take 1 Tablet by mouth two (2) times a day. Feliz Dey is a 33 y.o. female being evaluated by a video visit encounter for concerns as above. A caregiver was present when appropriate. Due to this being a TeleHealth encounter (During [facility] public health emergency), evaluation of the following organ systems was limited: Vitals/Constitutional/EENT/Resp/CV/GI//MS/Neuro/Skin/Heme-Lymph-Imm. Pursuant to the emergency declaration under the [facility], [address] waiver authority and the [employer] and [employer] Act, this Virtual  Visit was conducted, with patient's (and/or legal guardian's) consent, to reduce the patient's risk of exposure to COVID-19 and provide necessary medical care. Services were provided through a video synchronous discussion virtually to substitute for in-person clinic visit. Patient and provider were located at their individual homes. Jacinto Rivera MD  12/22/2021    This note was created with the help of speech recognition software Aiden Mc) and may contain some 'sound alike' errors.

## 2021-12-23 ENCOUNTER — VIRTUAL VISIT (OUTPATIENT)
Dept: INTERNAL MEDICINE CLINIC | Age: 31
End: 2021-12-23
Payer: COMMERCIAL

## 2021-12-23 DIAGNOSIS — F41.9 ANXIETY: Primary | ICD-10-CM

## 2021-12-23 DIAGNOSIS — F41.0 PANIC ATTACK: ICD-10-CM

## 2021-12-23 PROCEDURE — 99213 OFFICE O/P EST LOW 20 MIN: CPT | Performed by: INTERNAL MEDICINE

## 2021-12-23 RX ORDER — TRAZODONE HYDROCHLORIDE 50 MG/1
50 TABLET ORAL
Qty: 90 TABLET | Refills: 1 | Status: SHIPPED | OUTPATIENT
Start: 2021-12-23 | End: 2022-04-07

## 2021-12-23 RX ORDER — DULOXETIN HYDROCHLORIDE 60 MG/1
60 CAPSULE, DELAYED RELEASE ORAL DAILY
Qty: 90 CAPSULE | Refills: 1 | Status: SHIPPED | OUTPATIENT
Start: 2021-12-23 | End: 2022-04-07 | Stop reason: SDUPTHER

## 2021-12-23 RX ORDER — CLONAZEPAM 0.5 MG/1
TABLET ORAL
Qty: 60 TABLET | Refills: 1 | Status: SHIPPED | OUTPATIENT
Start: 2021-12-23 | End: 2022-10-23

## 2021-12-23 RX ORDER — PROPRANOLOL HYDROCHLORIDE 20 MG/1
20 TABLET ORAL 2 TIMES DAILY
Qty: 180 TABLET | Refills: 1 | Status: SHIPPED | OUTPATIENT
Start: 2021-12-23 | End: 2022-04-07 | Stop reason: SDUPTHER

## 2021-12-23 RX ORDER — BUSPIRONE HYDROCHLORIDE 10 MG/1
10 TABLET ORAL 2 TIMES DAILY
Qty: 180 TABLET | Refills: 1 | Status: SHIPPED | OUTPATIENT
Start: 2021-12-23 | End: 2022-04-07 | Stop reason: SDUPTHER

## 2022-01-14 ENCOUNTER — PATIENT MESSAGE (OUTPATIENT)
Dept: NEUROLOGY | Age: 32
End: 2022-01-14

## 2022-01-21 RX ORDER — DICLOFENAC POTASSIUM 50 MG/1
POWDER, FOR SOLUTION ORAL
Qty: 9 PACKET | Refills: 5 | Status: SHIPPED | OUTPATIENT
Start: 2022-01-21 | End: 2022-02-23

## 2022-01-27 RX ORDER — ONABOTULINUMTOXINA 200 [USP'U]/1
INJECTION, POWDER, LYOPHILIZED, FOR SOLUTION INTRADERMAL; INTRAMUSCULAR
Qty: 200 UNITS | Refills: 5 | Status: SHIPPED | OUTPATIENT
Start: 2022-01-27 | End: 2022-02-18 | Stop reason: SDUPTHER

## 2022-02-18 NOTE — TELEPHONE ENCOUNTER
Called [facility] to schedule the Botox shipment, and they have transferred the RX to [place] [id number] (who no longer carries Botox). Chan Hathaway @ [facility] states they are not in network with [place]. Will ask Lon Baig to send RX to [facility]. Appt 3/1/22.

## 2022-02-21 ENCOUNTER — PATIENT MESSAGE (OUTPATIENT)
Dept: NEUROLOGY | Age: 32
End: 2022-02-21

## 2022-02-22 ENCOUNTER — PATIENT MESSAGE (OUTPATIENT)
Dept: NEUROLOGY | Age: 32
End: 2022-02-22

## 2022-02-22 RX ORDER — ONABOTULINUMTOXINA 200 [USP'U]/1
INJECTION, POWDER, LYOPHILIZED, FOR SOLUTION INTRADERMAL; INTRAMUSCULAR
Qty: 200 UNITS | Refills: 5 | Status: SHIPPED | OUTPATIENT
Start: 2022-02-22

## 2022-02-23 RX ORDER — DICLOFENAC POTASSIUM 50 MG/1
POWDER, FOR SOLUTION ORAL
Qty: 9 PACKET | Refills: 5 | Status: SHIPPED | OUTPATIENT
Start: 2022-02-23 | End: 2022-06-24 | Stop reason: SDUPTHER

## 2022-02-25 ENCOUNTER — TELEPHONE (OUTPATIENT)
Dept: NEUROLOGY | Age: 32
End: 2022-02-25

## 2022-02-25 NOTE — TELEPHONE ENCOUNTER
[address] [facility], and they see the approved PA.  They will send Botox out Monday for a 3/1/22 am arrival.

## 2022-02-25 NOTE — TELEPHONE ENCOUNTER
Spoke with Ruth Salas @ [facility] in [place]- they still need the PA for the Botox for the delivery to be scheduled.

## 2022-02-25 NOTE — TELEPHONE ENCOUNTER
Re: Botox    Rcvd PA request from Nurse, created case for [employer] in [facility] key# [id number]    Submitted and awaiting update     [id number] is approved through 08/5/22  [id number]    [address] [facility]

## 2022-03-01 ENCOUNTER — OFFICE VISIT (OUTPATIENT)
Dept: NEUROLOGY | Age: 32
End: 2022-03-01
Payer: COMMERCIAL

## 2022-03-01 ENCOUNTER — TELEPHONE (OUTPATIENT)
Dept: NEUROLOGY | Age: 32
End: 2022-03-01

## 2022-03-01 DIAGNOSIS — G43.909 MIGRAINE WITHOUT STATUS MIGRAINOSUS, NOT INTRACTABLE, UNSPECIFIED MIGRAINE TYPE: Primary | ICD-10-CM

## 2022-03-01 PROCEDURE — 64615 CHEMODENERV MUSC MIGRAINE: CPT | Performed by: NURSE PRACTITIONER

## 2022-03-01 NOTE — PROGRESS NOTES
[facility]  OFFICE PROCEDURE PROGRESS NOTE        Chart reviewed for the following:   I, [de-identified] M JOE Villela, have reviewed the History, Physical and updated the Allergic reactions for [address] performed immediately prior to start of procedure:   I, [de-identified] M JOE Villela, have performed the following reviews on [address] [facility] [address] prior to the start of the procedure:            * Patient was identified by name and date of birth   * Agreement on procedure being performed was verified  * Risks and Benefits explained to the patient  * Procedure site verified and marked as necessary  * Patient was positioned for comfort  * Consent was signed and verified     Time: 1500      Date of procedure: 3/1/2022    Procedure performed by:  Alli Smalls NP    Provider assisted by: None    Patient assisted by: None    How tolerated by patient: tolerated the procedure well with no complications    Post Procedural Pain Scale: 2 - Hurts Little Bit    Comments: None      Botox Injection Note       Indication: patient has chronic recurrent migraine, has 7-10 less migraine days per month with botox injections    Procedure:   Botox concentration: 200 units in 4 ml of preservative-free normal saline. 31 sites injections, distribution as follow      Units/site  Sites Sides Subtotal    Procerus 5 1 1 5    5 1 2 10   Frontalis 5 2 2 20   Temporalis 5 4 2 40   Occipitalis 5 3 2 30   Upper cervical paraspinalis 5 2 2 20   Trapezius 5 3 2 30         200 units Botox were reconstituted, 155 units injected as above and the remainder was unavoidably wasted.      Patient tolerated procedure well.     _____________________________   Willy Larios

## 2022-03-16 ENCOUNTER — TELEPHONE (OUTPATIENT)
Dept: NEUROLOGY | Age: 32
End: 2022-03-16

## 2022-03-18 PROBLEM — E53.8 B12 DEFICIENCY: Status: ACTIVE | Noted: 2017-02-09

## 2022-03-19 PROBLEM — F41.9 ANXIETY: Status: ACTIVE | Noted: 2019-04-25

## 2022-03-19 PROBLEM — G43.009 MIGRAINE WITHOUT AURA AND WITHOUT STATUS MIGRAINOSUS, NOT INTRACTABLE: Status: ACTIVE | Noted: 2017-01-19

## 2022-03-19 PROBLEM — R63.5 WEIGHT GAIN: Status: ACTIVE | Noted: 2019-04-25

## 2022-04-07 DIAGNOSIS — F41.0 PANIC ATTACK: ICD-10-CM

## 2022-04-07 DIAGNOSIS — F41.9 ANXIETY: ICD-10-CM

## 2022-04-07 RX ORDER — DULOXETIN HYDROCHLORIDE 60 MG/1
60 CAPSULE, DELAYED RELEASE ORAL DAILY
Qty: 90 CAPSULE | Refills: 1 | Status: SHIPPED | OUTPATIENT
Start: 2022-04-07 | End: 2022-08-09 | Stop reason: ALTCHOICE

## 2022-04-07 RX ORDER — TRAZODONE HYDROCHLORIDE 100 MG/1
100 TABLET ORAL
Qty: 90 TABLET | Refills: 1 | Status: SHIPPED | OUTPATIENT
Start: 2022-04-07 | End: 2022-07-30

## 2022-04-07 RX ORDER — PROPRANOLOL HYDROCHLORIDE 20 MG/1
20 TABLET ORAL 2 TIMES DAILY
Qty: 180 TABLET | Refills: 1 | Status: SHIPPED | OUTPATIENT
Start: 2022-04-07

## 2022-04-07 RX ORDER — BUSPIRONE HYDROCHLORIDE 10 MG/1
10 TABLET ORAL 2 TIMES DAILY
Qty: 180 TABLET | Refills: 1 | Status: SHIPPED | OUTPATIENT
Start: 2022-04-07

## 2022-05-02 ENCOUNTER — TELEPHONE (OUTPATIENT)
Dept: NEUROLOGY | Age: 32
End: 2022-05-02

## 2022-05-02 NOTE — TELEPHONE ENCOUNTER
Called [employer] [place] @ [facility] and it is too soon to order.  If we call back on 5/13, they can deliver on 5/16 for the 5/31 appointment,

## 2022-05-18 ENCOUNTER — TELEPHONE (OUTPATIENT)
Dept: NEUROLOGY | Age: 32
End: 2022-05-18

## 2022-05-31 ENCOUNTER — OFFICE VISIT (OUTPATIENT)
Dept: NEUROLOGY | Age: 32
End: 2022-05-31
Payer: COMMERCIAL

## 2022-05-31 ENCOUNTER — TELEPHONE (OUTPATIENT)
Dept: NEUROLOGY | Age: 32
End: 2022-05-31

## 2022-05-31 DIAGNOSIS — G43.909 MIGRAINE WITHOUT STATUS MIGRAINOSUS, NOT INTRACTABLE, UNSPECIFIED MIGRAINE TYPE: Primary | ICD-10-CM

## 2022-05-31 PROCEDURE — 64615 CHEMODENERV MUSC MIGRAINE: CPT | Performed by: NURSE PRACTITIONER

## 2022-05-31 NOTE — PROGRESS NOTES
[facility]  OFFICE PROCEDURE PROGRESS NOTE        Chart reviewed for the following:   I, [de-identified] M JOE Villela, have reviewed the History, Physical and updated the Allergic reactions for [address] performed immediately prior to start of procedure:   I, [de-identified] M JOE Villela, have performed the following reviews on [address] [facility] [address] prior to the start of the procedure:            * Patient was identified by name and date of birth   * Agreement on procedure being performed was verified  * Risks and Benefits explained to the patient  * Procedure site verified and marked as necessary  * Patient was positioned for comfort  * Consent was signed and verified     Time: 1500      Date of procedure: 5/31/2022    Procedure performed by:  Josh Conway NP    Provider assisted by: None    Patient assisted by: None    How tolerated by patient: tolerated the procedure well with no complications    Post Procedural Pain Scale: 2 - Hurts Little Bit    Comments: None      Botox Injection Note       Indication: patient has chronic recurrent migraine, has 7-10 less migraine days per month with botox injections    Procedure:   Botox concentration: 200 units in 4 ml of preservative-free normal saline. 31 sites injections, distribution as follow      Units/site  Sites Sides Subtotal    Procerus 5 1 1 5    5 1 2 10   Frontalis 5 2 2 20   Temporalis 5 4 2 40   Occipitalis 5 3 2 30   Upper cervical paraspinalis 5 2 2 20   Trapezius 5 3 2 30         200 units Botox were reconstituted, 155 units injected as above and the remainder was unavoidably wasted.      Patient tolerated procedure well.     _____________________________   Juan Bustos

## 2022-06-02 DIAGNOSIS — F90.0 ATTENTION DEFICIT HYPERACTIVITY DISORDER (ADHD), PREDOMINANTLY INATTENTIVE TYPE: Primary | ICD-10-CM

## 2022-06-02 RX ORDER — DEXTROAMPHETAMINE SACCHARATE, AMPHETAMINE ASPARTATE MONOHYDRATE, DEXTROAMPHETAMINE SULFATE AND AMPHETAMINE SULFATE 2.5; 2.5; 2.5; 2.5 MG/1; MG/1; MG/1; MG/1
10 CAPSULE, EXTENDED RELEASE ORAL
Qty: 30 CAPSULE | Refills: 0 | Status: SHIPPED | OUTPATIENT
Start: 2022-06-02 | End: 2022-06-29 | Stop reason: SDUPTHER

## 2022-06-24 RX ORDER — DICLOFENAC POTASSIUM 50 MG/1
POWDER, FOR SOLUTION ORAL
Qty: 9 PACKET | Refills: 5 | Status: SHIPPED | OUTPATIENT
Start: 2022-06-24 | End: 2022-08-09 | Stop reason: ALTCHOICE

## 2022-06-29 DIAGNOSIS — F90.0 ATTENTION DEFICIT HYPERACTIVITY DISORDER (ADHD), PREDOMINANTLY INATTENTIVE TYPE: ICD-10-CM

## 2022-06-29 RX ORDER — DEXTROAMPHETAMINE SACCHARATE, AMPHETAMINE ASPARTATE MONOHYDRATE, DEXTROAMPHETAMINE SULFATE AND AMPHETAMINE SULFATE 2.5; 2.5; 2.5; 2.5 MG/1; MG/1; MG/1; MG/1
10 CAPSULE, EXTENDED RELEASE ORAL
Qty: 30 CAPSULE | Refills: 0 | Status: SHIPPED | OUTPATIENT
Start: 2022-06-29 | End: 2022-07-15 | Stop reason: SDUPTHER

## 2022-07-15 DIAGNOSIS — F90.0 ATTENTION DEFICIT HYPERACTIVITY DISORDER (ADHD), PREDOMINANTLY INATTENTIVE TYPE: ICD-10-CM

## 2022-07-15 RX ORDER — DEXTROAMPHETAMINE SACCHARATE, AMPHETAMINE ASPARTATE MONOHYDRATE, DEXTROAMPHETAMINE SULFATE AND AMPHETAMINE SULFATE 2.5; 2.5; 2.5; 2.5 MG/1; MG/1; MG/1; MG/1
CAPSULE, EXTENDED RELEASE ORAL
Qty: 60 CAPSULE | Refills: 0 | Status: SHIPPED | OUTPATIENT
Start: 2022-07-15 | End: 2022-07-15 | Stop reason: SDUPTHER

## 2022-07-15 RX ORDER — DEXTROAMPHETAMINE SACCHARATE, AMPHETAMINE ASPARTATE MONOHYDRATE, DEXTROAMPHETAMINE SULFATE AND AMPHETAMINE SULFATE 2.5; 2.5; 2.5; 2.5 MG/1; MG/1; MG/1; MG/1
CAPSULE, EXTENDED RELEASE ORAL
Qty: 60 CAPSULE | Refills: 0 | Status: SHIPPED | OUTPATIENT
Start: 2022-07-15 | End: 2022-08-21 | Stop reason: SDUPTHER

## 2022-07-15 NOTE — PROGRESS NOTES
PA Case: [id number], Status: Approved, Coverage Starts on: 7/15/2022 12:00:00 AM, Coverage Ends on: 7/15/2023 12:00:00 AM.    #60/30 days    Adderall XR 10 mg capsules

## 2022-07-30 DIAGNOSIS — F41.9 ANXIETY: ICD-10-CM

## 2022-07-30 RX ORDER — TRAZODONE HYDROCHLORIDE 100 MG/1
TABLET ORAL
Qty: 90 TABLET | Refills: 1 | Status: SHIPPED | OUTPATIENT
Start: 2022-07-30

## 2022-08-09 ENCOUNTER — VIRTUAL VISIT (OUTPATIENT)
Dept: INTERNAL MEDICINE CLINIC | Age: 32
End: 2022-08-09
Payer: COMMERCIAL

## 2022-08-09 DIAGNOSIS — F41.9 ANXIETY: Primary | ICD-10-CM

## 2022-08-09 DIAGNOSIS — F41.0 PANIC ATTACK: ICD-10-CM

## 2022-08-09 PROCEDURE — 99213 OFFICE O/P EST LOW 20 MIN: CPT | Performed by: INTERNAL MEDICINE

## 2022-08-09 RX ORDER — DULOXETIN HYDROCHLORIDE 30 MG/1
30 CAPSULE, DELAYED RELEASE ORAL DAILY
Qty: 30 CAPSULE | Refills: 1 | Status: SHIPPED | OUTPATIENT
Start: 2022-08-09 | End: 2022-09-04

## 2022-08-09 RX ORDER — ESCITALOPRAM OXALATE 5 MG/1
5 TABLET ORAL DAILY
Qty: 30 TABLET | Refills: 1 | Status: SHIPPED | OUTPATIENT
Start: 2022-08-09 | End: 2022-09-30

## 2022-08-09 NOTE — PROGRESS NOTES
Carletha Habermann was seen on 8/9/2022 using synchronous (real-time) audio-video technology; doxy. me. Consent: Carletha Habermann, who was seen by synchronous (real-time) audio-video technology, and/or her healthcare decision maker, is aware that this patient-initiated, Telehealth encounter on 8/9/2022 is a billable service, with coverage as determined by her insurance carrier. She is aware that she may receive a bill and has provided verbal consent to proceed: Yes. I was in the office while conducting this encounter. Carletha Habermann is a 27 y.o. female who presents today for Medication Evaluation (Anxiety medications making her feel hot)  . She has a history of   Patient Active Problem List   Diagnosis Code    Nephrolithiasis N20.0    Allergic rhinitis J30.9    Family history of early CAD Z82.49    Migraine without aura and without status migrainosus, not intractable G43.009    B12 deficiency E53.8    Anxiety F41.9    Weight gain R63.5   . Today patient is being seen for follow up. Anxiety: Patient remains on daily beta-blocker as well as Cymbalta and buspirone. She reports that overall her anxiety levels have been moderate. Has been following with neurology who treats her migraines, but recently has started her on Adderall for attention. Reports feeling hot with this medication. This is especially true during the summer. She is always have a side effect since we started the medications. She asks what else we can do for her anxiety. We discussed switching her to an SSRI while decreasing the dose of Cymbalta. ROS  Review of Systems   Constitutional:  Negative for chills, fever and weight loss. Always feeling hot   HENT:  Negative for congestion and sore throat. Eyes:  Negative for blurred vision, double vision and photophobia. Respiratory:  Negative for cough and shortness of breath. Cardiovascular:  Negative for chest pain, palpitations and leg swelling.    Gastrointestinal: Negative for abdominal pain, constipation, diarrhea, heartburn, nausea and vomiting. Genitourinary:  Negative for dysuria, frequency and urgency. Musculoskeletal:  Negative for joint pain and myalgias. Skin:  Negative for rash. Neurological:  Positive for headaches (Stable. ). Endo/Heme/Allergies:  Does not bruise/bleed easily. Psychiatric/Behavioral:  Negative for memory loss and suicidal ideas. The patient is nervous/anxious. There were no vitals taken for this visit. Patient-Reported Vitals 4/2/2021   Patient-Reported Weight -   Patient-Reported Height -   Patient-Reported LMP no cycle de to medications        Physical Exam  Constitutional:       Appearance: Normal appearance. HENT:      Head: Normocephalic and atraumatic. Pulmonary:      Effort: Pulmonary effort is normal.   Neurological:      General: No focal deficit present. Mental Status: She is alert. Psychiatric:         Mood and Affect: Mood normal.         Behavior: Behavior normal.         Current Outpatient Medications   Medication Sig    DULoxetine (CYMBALTA) 30 mg capsule Take 1 Capsule by mouth in the morning. escitalopram oxalate (LEXAPRO) 5 mg tablet Take 1 Tablet by mouth in the morning. traZODone (DESYREL) 100 mg tablet TAKE 1 TABLET BY MOUTH EVERY NIGHT    amphetamine-dextroamphetamine XR (ADDERALL XR) 10 mg XR capsule 1 capsule by mouth in the AM and 1 capsule by mouth at lunch   PA Case: [id number] approved    busPIRone (BUSPAR) 10 mg tablet Take 1 Tablet by mouth two (2) times a day. propranoloL (INDERAL) 20 mg tablet Take 1 Tablet by mouth two (2) times a day. .    onabotulinumtoxinA (Botox) 200 unit injection INJECT 200 UNITS INTRAMUSCULARLY INTO HEAD AND NECK EVERY 3 MONTHS.     clonazePAM (KlonoPIN) 0.5 mg tablet TAKE 1 TABLET BY MOUTH TWICE DAILY AS NEEDED FOR ANXIETY OR INSOMNIA    rizatriptan (MAXALT-MLT) 10 mg disintegrating tablet TAKE 1 TABLETS BY MOUTH AT THE ONSET OF HEADACHE AND REPEAT IN 2 HOURS IF NEEDED. MAX OF 2 IN 24 HRS    Bacillus coagulans (Digestive Advantage Prob Gummy) 250 million cell chew Take  by mouth. fexofenadine-pseudoephedrine (ALLEGRA-D 24) 180-240 mg per tablet Take 1 Tab by mouth daily. MICROGESTIN FE 1.5/30, 28, 1.5 mg-30 mcg (21)/75 mg (7) tab      No current facility-administered medications for this visit. Past Medical History:   Diagnosis Date    Acne     Allergic rhinitis 08/24/2016    shots Dr. Yuli Jc    Arrhythmia     B12 deficiency 2/9/2017    Calculus of kidney     Headache     Migraine without aura and without status migrainosus, not intractable 01/19/2017    botox injections. Dr. Jefe Fairbanks    Nephrolithiasis 8/24/2016      Past Surgical History:   Procedure Laterality Date    HX LITHOTRIPSY        Social History     Tobacco Use    Smoking status: Never    Smokeless tobacco: Never   Substance Use Topics    Alcohol use: Yes      Family History   Problem Relation Age of Onset    Cancer Father         Prostate    Headache Father     Heart Disease Father     Diabetes Father         Allergies   Allergen Reactions    Salicylic Acid-Sulfur Anaphylaxis    Sulfur Unknown (comments)        Assessment/Plan  Diagnoses and all orders for this visit:    1. Anxiety-side effects of Cymbalta are no longer tolerable. We will titrate her down to 30 mg and start her on Lexapro for anxiety. We discussed continuing propranolol and BuSpar. She will let us know in a month how she is doing. If the side effects are better she may remain on duloxetine versus titrating down to 20 mg. She will also let us know if she wants to go up to 10 mg of Lexapro. Continues to work with neurology for her headaches and her attention problems. 2. Panic attack    Other orders  -     DULoxetine (CYMBALTA) 30 mg capsule; Take 1 Capsule by mouth in the morning.  -     escitalopram oxalate (LEXAPRO) 5 mg tablet; Take 1 Tablet by mouth in the morning.              Linda Williamson, was evaluated through a synchronous (real-time) audio-video encounter. The patient (or guardian if applicable) is aware that this is a billable service, which includes applicable co-pays. This Virtual Visit was conducted with patient's (and/or legal guardian's) consent. The visit was conducted pursuant to the emergency declaration under the [address] [place] [address] authority and the [employer] and [employer] Act. Patient identification was verified, and a caregiver was present when appropriate. The patient was located in a state where the provider was licensed to provide care. Naomy Lopez MD  8/9/2022    This note was created with the help of speech recognition software Nate Arreaga) and may contain some 'sound alike' errors.

## 2022-08-16 ENCOUNTER — TELEPHONE (OUTPATIENT)
Dept: NEUROLOGY | Age: 32
End: 2022-08-16

## 2022-08-16 NOTE — TELEPHONE ENCOUNTER
Re: botox    Rcvd call from local [facility], they need pt to call them before delivery can be set up. Sent pt my-chart message and message to nurse.

## 2022-08-20 DIAGNOSIS — F90.0 ATTENTION DEFICIT HYPERACTIVITY DISORDER (ADHD), PREDOMINANTLY INATTENTIVE TYPE: ICD-10-CM

## 2022-08-20 RX ORDER — DEXTROAMPHETAMINE SACCHARATE, AMPHETAMINE ASPARTATE MONOHYDRATE, DEXTROAMPHETAMINE SULFATE AND AMPHETAMINE SULFATE 2.5; 2.5; 2.5; 2.5 MG/1; MG/1; MG/1; MG/1
CAPSULE, EXTENDED RELEASE ORAL
Qty: 60 CAPSULE | Refills: 0 | Status: CANCELLED | OUTPATIENT
Start: 2022-08-20

## 2022-08-21 DIAGNOSIS — F90.0 ATTENTION DEFICIT HYPERACTIVITY DISORDER (ADHD), PREDOMINANTLY INATTENTIVE TYPE: ICD-10-CM

## 2022-08-21 RX ORDER — DEXTROAMPHETAMINE SACCHARATE, AMPHETAMINE ASPARTATE MONOHYDRATE, DEXTROAMPHETAMINE SULFATE AND AMPHETAMINE SULFATE 2.5; 2.5; 2.5; 2.5 MG/1; MG/1; MG/1; MG/1
CAPSULE, EXTENDED RELEASE ORAL
Qty: 60 CAPSULE | Refills: 0 | Status: SHIPPED | OUTPATIENT
Start: 2022-08-21 | End: 2022-09-02 | Stop reason: ALTCHOICE

## 2022-08-23 ENCOUNTER — TELEPHONE (OUTPATIENT)
Dept: NEUROLOGY | Age: 32
End: 2022-08-23

## 2022-08-23 NOTE — TELEPHONE ENCOUNTER
Re: Botox    vd call from local [facility]. They are waiting on pt to call them to see if she set up up co-pay assistance card. Tried to call pt an left v/m to call [facility].

## 2022-08-25 ENCOUNTER — PATIENT MESSAGE (OUTPATIENT)
Dept: NEUROLOGY | Age: 32
End: 2022-08-25

## 2022-08-25 NOTE — TELEPHONE ENCOUNTER
[address] [place] [address], and they have tried to reach the patient about her Botox copay. The last time they spoke with her, they gave her the phone number for Botox to renew her copay card (it has ) and asked her to call back when this was done. They have called her twice since and left messages without response.

## 2022-08-26 ENCOUNTER — PATIENT MESSAGE (OUTPATIENT)
Dept: NEUROLOGY | Age: 32
End: 2022-08-26

## 2022-08-30 ENCOUNTER — TELEPHONE (OUTPATIENT)
Dept: NEUROLOGY | Age: 32
End: 2022-08-30

## 2022-08-30 NOTE — TELEPHONE ENCOUNTER
Re:Botox    Rcvd call from [facility], they advised pt has set up copay saving, scheduled delivery for 08/31/22. Sent update to nurse.

## 2022-09-02 ENCOUNTER — OFFICE VISIT (OUTPATIENT)
Dept: NEUROLOGY | Age: 32
End: 2022-09-02
Payer: COMMERCIAL

## 2022-09-02 ENCOUNTER — TELEPHONE (OUTPATIENT)
Dept: NEUROLOGY | Age: 32
End: 2022-09-02

## 2022-09-02 DIAGNOSIS — F90.0 ATTENTION DEFICIT HYPERACTIVITY DISORDER (ADHD), PREDOMINANTLY INATTENTIVE TYPE: Primary | ICD-10-CM

## 2022-09-02 DIAGNOSIS — G43.909 MIGRAINE WITHOUT STATUS MIGRAINOSUS, NOT INTRACTABLE, UNSPECIFIED MIGRAINE TYPE: ICD-10-CM

## 2022-09-02 PROCEDURE — 64615 CHEMODENERV MUSC MIGRAINE: CPT | Performed by: NURSE PRACTITIONER

## 2022-09-02 RX ORDER — DICLOFENAC POTASSIUM 50 MG/1
POWDER, FOR SOLUTION ORAL
Qty: 9 PACKET | Refills: 5 | Status: SHIPPED | OUTPATIENT
Start: 2022-09-02

## 2022-09-02 RX ORDER — DEXTROAMPHETAMINE SACCHARATE, AMPHETAMINE ASPARTATE MONOHYDRATE, DEXTROAMPHETAMINE SULFATE AND AMPHETAMINE SULFATE 7.5; 7.5; 7.5; 7.5 MG/1; MG/1; MG/1; MG/1
30 CAPSULE, EXTENDED RELEASE ORAL
Qty: 30 CAPSULE | Refills: 0 | Status: SHIPPED | OUTPATIENT
Start: 2022-09-02 | End: 2022-09-29 | Stop reason: SDUPTHER

## 2022-09-02 NOTE — PROGRESS NOTES
[facility]  OFFICE PROCEDURE PROGRESS NOTE        Chart reviewed for the following:   I, [de-identified] M JOE Villela, have reviewed the History, Physical and updated the Allergic reactions for [address] [facility] performed immediately prior to start of procedure:   I, [de-identified] M JOE Villela, have performed the following reviews [facility] [id number] [facility] [address] prior to the start of the procedure:            * Patient was identified by name and date of birth   * Agreement on procedure being performed was verified  * Risks and Benefits explained to the patient  * Procedure site verified and marked as necessary  * Patient was positioned for comfort  * Consent was signed and verified     Time: 0850      Date of procedure: 9/2/2022    Procedure performed by:  Maria Luz Alexander NP    Provider assisted by: None    Patient assisted by: None    How tolerated by patient: tolerated the procedure well with no complications    Post Procedural Pain Scale: 2 - Hurts Little Bit    Comments: None      Botox Injection Note       Indication: patient has chronic recurrent migraine, has 7-10 less migraine days per month with botox injections    Procedure:   Botox concentration: 200 units in 4 ml of preservative-free normal saline. 31 sites injections, distribution as follow      Units/site  Sites Sides Subtotal    Procerus 5 1 1 5    5 1 2 10   Frontalis 5 2 2 20   Temporalis 5 4 2 40   Occipitalis 5 3 2 30   Upper cervical paraspinalis 5 2 2 20   Trapezius 5 3 2 30         200 units Botox were reconstituted, 155 units injected as above and the remainder was unavoidably wasted.      Patient tolerated procedure well.     _____________________________   Carlos Palma

## 2022-09-04 RX ORDER — DULOXETIN HYDROCHLORIDE 20 MG/1
20 CAPSULE, DELAYED RELEASE ORAL DAILY
Qty: 30 CAPSULE | Refills: 1 | Status: SHIPPED | OUTPATIENT
Start: 2022-09-04 | End: 2022-10-14 | Stop reason: ALTCHOICE

## 2022-09-29 DIAGNOSIS — F90.0 ATTENTION DEFICIT HYPERACTIVITY DISORDER (ADHD), PREDOMINANTLY INATTENTIVE TYPE: ICD-10-CM

## 2022-09-30 RX ORDER — ESCITALOPRAM OXALATE 5 MG/1
5 TABLET ORAL DAILY
Qty: 30 TABLET | Refills: 1 | Status: SHIPPED | OUTPATIENT
Start: 2022-09-30 | End: 2022-10-14 | Stop reason: SDUPTHER

## 2022-10-01 DIAGNOSIS — F90.0 ATTENTION DEFICIT HYPERACTIVITY DISORDER (ADHD), PREDOMINANTLY INATTENTIVE TYPE: ICD-10-CM

## 2022-10-02 DIAGNOSIS — F90.0 ATTENTION DEFICIT HYPERACTIVITY DISORDER (ADHD), PREDOMINANTLY INATTENTIVE TYPE: ICD-10-CM

## 2022-10-03 RX ORDER — DEXTROAMPHETAMINE SACCHARATE, AMPHETAMINE ASPARTATE MONOHYDRATE, DEXTROAMPHETAMINE SULFATE AND AMPHETAMINE SULFATE 7.5; 7.5; 7.5; 7.5 MG/1; MG/1; MG/1; MG/1
30 CAPSULE, EXTENDED RELEASE ORAL
Qty: 30 CAPSULE | Refills: 0 | Status: SHIPPED | OUTPATIENT
Start: 2022-10-03 | End: 2022-10-06 | Stop reason: SDUPTHER

## 2022-10-03 RX ORDER — DEXTROAMPHETAMINE SACCHARATE, AMPHETAMINE ASPARTATE MONOHYDRATE, DEXTROAMPHETAMINE SULFATE AND AMPHETAMINE SULFATE 7.5; 7.5; 7.5; 7.5 MG/1; MG/1; MG/1; MG/1
30 CAPSULE, EXTENDED RELEASE ORAL
Qty: 30 CAPSULE | Refills: 0 | Status: SHIPPED | OUTPATIENT
Start: 2022-10-03

## 2022-10-06 DIAGNOSIS — F90.0 ATTENTION DEFICIT HYPERACTIVITY DISORDER (ADHD), PREDOMINANTLY INATTENTIVE TYPE: ICD-10-CM

## 2022-10-06 RX ORDER — DEXTROAMPHETAMINE SACCHARATE, AMPHETAMINE ASPARTATE MONOHYDRATE, DEXTROAMPHETAMINE SULFATE AND AMPHETAMINE SULFATE 7.5; 7.5; 7.5; 7.5 MG/1; MG/1; MG/1; MG/1
30 CAPSULE, EXTENDED RELEASE ORAL
Qty: 30 CAPSULE | Refills: 0 | Status: SHIPPED | OUTPATIENT
Start: 2022-10-06

## 2022-10-14 ENCOUNTER — OFFICE VISIT (OUTPATIENT)
Dept: INTERNAL MEDICINE CLINIC | Age: 32
End: 2022-10-14
Payer: COMMERCIAL

## 2022-10-14 VITALS
HEART RATE: 83 BPM | WEIGHT: 204.2 LBS | DIASTOLIC BLOOD PRESSURE: 85 MMHG | RESPIRATION RATE: 14 BRPM | TEMPERATURE: 98.7 F | SYSTOLIC BLOOD PRESSURE: 119 MMHG | OXYGEN SATURATION: 96 % | HEIGHT: 61 IN | BODY MASS INDEX: 38.55 KG/M2

## 2022-10-14 DIAGNOSIS — Z83.3 FAMILY HISTORY OF DIABETES MELLITUS: ICD-10-CM

## 2022-10-14 DIAGNOSIS — F41.9 ANXIETY: ICD-10-CM

## 2022-10-14 DIAGNOSIS — Z51.81 ENCOUNTER FOR MEDICATION MONITORING: ICD-10-CM

## 2022-10-14 DIAGNOSIS — F41.0 PANIC ATTACK: ICD-10-CM

## 2022-10-14 DIAGNOSIS — R63.5 WEIGHT GAIN: Primary | ICD-10-CM

## 2022-10-14 LAB
AMPHETAMINE QL URINE POC: NORMAL
BARBITURATES UR POC: NEGATIVE
BENZODIAZEPINES UR POC: NEGATIVE
COCAINE QL URINE POC: NEGATIVE
LOT EXP DATE POC: NORMAL
LOT NUMBER POC: NORMAL
MARIJUANA (THC) QL URINE POC: NEGATIVE
MDMA/ECSTASY UR POC: NEGATIVE
METHADONE QL URINE POC: NEGATIVE
METHAMPHETAMINE QL URINE POC: NEGATIVE
NTI OTHER MICRO TRANSPORT 977598: NEGATIVE
OPIATES QL URINE POC: NEGATIVE
OXYCODONE UR POC: NEGATIVE
VALID INTERNAL CONTROL?: YES

## 2022-10-14 PROCEDURE — 99214 OFFICE O/P EST MOD 30 MIN: CPT | Performed by: INTERNAL MEDICINE

## 2022-10-14 PROCEDURE — 80305 DRUG TEST PRSMV DIR OPT OBS: CPT | Performed by: INTERNAL MEDICINE

## 2022-10-14 RX ORDER — NORETHINDRONE ACETATE AND ETHINYL ESTRADIOL 1.5; 3 MG/1; UG/1
1 TABLET ORAL DAILY
COMMUNITY
Start: 2022-09-11

## 2022-10-14 RX ORDER — ESCITALOPRAM OXALATE 5 MG/1
5 TABLET ORAL DAILY
Qty: 90 TABLET | Refills: 1 | Status: SHIPPED | OUTPATIENT
Start: 2022-10-14

## 2022-10-14 NOTE — PROGRESS NOTES
Keyshawn Moody  Identified pt with two pt identifiers(name and ). No chief complaint on file. 1. Have you been to the ER, urgent care clinic since your last visit? Hospitalized since your last visit? NO    2. Have you seen or consulted any other health care providers outside of the [address] [place] [address] since your last visit? Include any pap smears or colon screening. NO      Provider notified of reason for visit, vitals and flowsheets obtained on patients.      Patient received paperwork for advance directive during previous visit but has not completed at this time     Reviewed record In preparation for visit, huddled with provider and have obtained necessary documentation      Health Maintenance Due   Topic    COVID-19 Vaccine (1)    DTaP/Tdap/Td series (1 - Tdap)    Cervical cancer screen     Depression Screen     Flu Vaccine (1)       Wt Readings from Last 3 Encounters:   10/14/22 204 lb 3.2 oz (92.6 kg)   21 175 lb (79.4 kg)   20 181 lb (82.1 kg)     Temp Readings from Last 3 Encounters:   10/14/22 98.7 °F (37.1 °C) (Oral)   20 98.3 °F (36.8 °C) (Oral)   10/03/19 99.2 °F (37.3 °C) (Oral)     BP Readings from Last 3 Encounters:   10/14/22 119/85   21 114/78   20 110/82     Pulse Readings from Last 3 Encounters:   10/14/22 83   21 87   20 86     Vitals:    10/14/22 1453   BP: 119/85   Pulse: 83   Resp: 14   Temp: 98.7 °F (37.1 °C)   TempSrc: Oral   SpO2: 96%   Weight: 204 lb 3.2 oz (92.6 kg)   Height: 5' 1\" (1.549 m)   PainSc:   0 - No pain         Learning Assessment:  :     Learning Assessment 4/15/2016   PRIMARY LEARNER Patient   PRIMARY LANGUAGE ENGLISH   LEARNER PREFERENCE PRIMARY DEMONSTRATION     LISTENING   ANSWERED BY patient   RELATIONSHIP SELF       Depression Screening:  :     3 most recent PHQ Screens 10/14/2022   Little interest or pleasure in doing things Not at all   Feeling down, depressed, irritable, or hopeless Not at all   Total Score PHQ 2 0   Trouble falling or staying asleep, or sleeping too much Not at all   Feeling tired or having little energy Not at all   Poor appetite, weight loss, or overeating Not at all   Feeling bad about yourself - or that you are a failure or have let yourself or your family down Not at all   Trouble concentrating on things such as school, work, reading, or watching TV Not at all   Moving or speaking so slowly that other people could have noticed; or the opposite being so fidgety that others notice Not at all   Thoughts of being better off dead, or hurting yourself in some way Not at all   PHQ 9 Score 0   How difficult have these problems made it for you to do your work, take care of your home and get along with others Not difficult at all       Fall Risk Assessment:  :     Fall Risk Assessment, last 12 mths 1/14/2021   Able to walk? Yes   Fall in past 12 months? 0   Do you feel unsteady? 0   Are you worried about falling 0       Abuse Screening:  :     Abuse Screening Questionnaire 1/14/2021 7/15/2020 10/3/2019 7/8/2019 5/24/2019 4/25/2019 8/1/2017   Do you ever feel afraid of your partner? N N N N N N N   Are you in a relationship with someone who physically or mentally threatens you? N N N N N N N   Is it safe for you to go home? Y Y Y Y Y Y Y       ADL Screening:  :     No flowsheet data found. Medication reconciliation up to date and corrected with patient at this time.

## 2022-10-14 NOTE — PROGRESS NOTES
Cindy Cornelius  Identified pt with two pt identifiers(name and ). Chief Complaint   Patient presents with    Medication Refill     RM21// pt presenting today for medication evaluation and refill       1. Have you been to the ER, urgent care clinic since your last visit? Hospitalized since your last visit? NO    2. Have you seen or consulted any other health care providers outside of the [address] [place] [address] since your last visit? Include any pap smears or colon screening. NO      Provider notified of reason for visit, vitals and flowsheets obtained on patients.      Patient received paperwork for advance directive during previous visit but has not completed at this time     Reviewed record In preparation for visit, huddled with provider and have obtained necessary documentation      Health Maintenance Due   Topic    COVID-19 Vaccine (1)    DTaP/Tdap/Td series (1 - Tdap)    Cervical cancer screen     Depression Screen     Flu Vaccine (1)       Wt Readings from Last 3 Encounters:   10/14/22 204 lb 3.2 oz (92.6 kg)   21 175 lb (79.4 kg)   20 181 lb (82.1 kg)     Temp Readings from Last 3 Encounters:   10/14/22 98.7 °F (37.1 °C) (Oral)   20 98.3 °F (36.8 °C) (Oral)   10/03/19 99.2 °F (37.3 °C) (Oral)     BP Readings from Last 3 Encounters:   10/14/22 119/85   21 114/78   20 110/82     Pulse Readings from Last 3 Encounters:   10/14/22 83   21 87   20 86     Vitals:    10/14/22 1453   BP: 119/85   Pulse: 83   Resp: 14   Temp: 98.7 °F (37.1 °C)   TempSrc: Oral   SpO2: 96%   Weight: 204 lb 3.2 oz (92.6 kg)   Height: 5' 1\" (1.549 m)   PainSc:   0 - No pain         Learning Assessment:  :     Learning Assessment 4/15/2016   PRIMARY LEARNER Patient   PRIMARY LANGUAGE ENGLISH   LEARNER PREFERENCE PRIMARY DEMONSTRATION     LISTENING   ANSWERED BY patient   RELATIONSHIP SELF       Depression Screening:  :     3 most recent PHQ Screens 10/14/2022   Little interest or pleasure in doing things Not at all   Feeling down, depressed, irritable, or hopeless Not at all   Total Score PHQ 2 0   Trouble falling or staying asleep, or sleeping too much Not at all   Feeling tired or having little energy Not at all   Poor appetite, weight loss, or overeating Not at all   Feeling bad about yourself - or that you are a failure or have let yourself or your family down Not at all   Trouble concentrating on things such as school, work, reading, or watching TV Not at all   Moving or speaking so slowly that other people could have noticed; or the opposite being so fidgety that others notice Not at all   Thoughts of being better off dead, or hurting yourself in some way Not at all   PHQ 9 Score 0   How difficult have these problems made it for you to do your work, take care of your home and get along with others Not difficult at all       Fall Risk Assessment:  :     Fall Risk Assessment, last 12 mths 1/14/2021   Able to walk? Yes   Fall in past 12 months? 0   Do you feel unsteady? 0   Are you worried about falling 0       Abuse Screening:  :     Abuse Screening Questionnaire 1/14/2021 7/15/2020 10/3/2019 7/8/2019 5/24/2019 4/25/2019 8/1/2017   Do you ever feel afraid of your partner? N N N N N N N   Are you in a relationship with someone who physically or mentally threatens you? N N N N N N N   Is it safe for you to go home? Y Y Y Y Y Y Y       ADL Screening:  :     No flowsheet data found. Medication reconciliation up to date and corrected with patient at this time.

## 2022-10-14 NOTE — PROGRESS NOTES
Nilay Bro is a 27 y.o. female who presents today for Medication Refill (RM21// pt presenting today for medication evaluation and refill)  . She has a history of   Patient Active Problem List   Diagnosis Code    Nephrolithiasis N20.0    Allergic rhinitis J30.9    Family history of early CAD Z82.49    Migraine without aura and without status migrainosus, not intractable G43.009    B12 deficiency E53.8    Anxiety F41.9    Weight gain R63.5   . Today patient is here for follow up. she does not have other concerns. Anxiety: Anxiety was still quite high last year and she was experiencing side effects of sweating with the Cymbalta we decided to start her on an SSRI. She had pretty drastic improvements in her anxiety with the addition of Lexapro. At last visit we did decrease her Cymbalta dose to 20 mg because she was still having some diaphoresis. Since she reports that her anxiety is under better control than it has been in a long time. She would like to wean off Cymbalta completely. Hot flashes have improved. She continues to take BuSpar as well as the beta-blocker, but just taking this once daily. Continues to be frustrated by her weight gain and her weight overall. Patient's appetite and dietary habits are relatively healthy. She does exercise regularly. She does have a strong family history of obesity. We discussed checking A1c and evaluating her for impaired fasting glucose given her family history. Could consider a GLP-1 or metformin if she does have some degree of insulin resistance. Adderall being managed through neurology. ROS  Review of Systems   Constitutional:  Negative for chills, fever and weight loss. HENT:  Negative for congestion and sore throat. Eyes:  Negative for blurred vision, double vision and photophobia. Respiratory:  Negative for cough and shortness of breath. Cardiovascular:  Negative for chest pain, palpitations and leg swelling. Gastrointestinal:  Negative for abdominal pain, constipation, diarrhea, heartburn, nausea and vomiting. Genitourinary:  Negative for dysuria, frequency and urgency. Musculoskeletal:  Negative for joint pain and myalgias. Skin:  Negative for rash. Neurological: Negative. Negative for headaches. Endo/Heme/Allergies:  Does not bruise/bleed easily. Psychiatric/Behavioral:  Negative for memory loss and suicidal ideas. Nervous/anxious: Much better. Visit Vitals  /85 (BP 1 Location: Left upper arm, BP Patient Position: Sitting, BP Cuff Size: Large adult)   Pulse 83   Temp 98.7 °F (37.1 °C) (Oral)   Resp 14   Ht 5' 1\" (1.549 m)   Wt 204 lb 3.2 oz (92.6 kg)   SpO2 96%   BMI 38.58 kg/m²       Physical Exam      Current Outpatient Medications   Medication Sig    Junel 1.5/30, 21, 1.5-30 mg-mcg tab Take 1 Tablet by mouth daily. amphetamine-dextroamphetamine XR (ADDERALL XR) 30 mg XR capsule Take 1 Capsule by mouth every morning. Max Daily Amount: 30 mg.    amphetamine-dextroamphetamine XR (ADDERALL XR) 30 mg XR capsule Take 1 Capsule by mouth every morning. Max Daily Amount: 30 mg.    amphetamine-dextroamphetamine XR (ADDERALL XR) 30 mg XR capsule Take 1 Capsule by mouth every morning. Max Daily Amount: 30 mg.    escitalopram oxalate (LEXAPRO) 5 mg tablet TAKE 1 TABLET BY MOUTH IN THE MORNING    DULoxetine (CYMBALTA) 20 mg capsule Take 1 Capsule by mouth daily. Diclofenac Potassium (Cambia) 50 mg pwpk MIX AND DRINK 1 PACKET BY MOUTH AT ONSET OF HEADACHE. MAY REPEAT AGAIN IN 2 HOURS FOR 1 DOSE. MAX 2 DOSES IN 24 HOURS    traZODone (DESYREL) 100 mg tablet TAKE 1 TABLET BY MOUTH EVERY NIGHT    busPIRone (BUSPAR) 10 mg tablet Take 1 Tablet by mouth two (2) times a day. propranoloL (INDERAL) 20 mg tablet Take 1 Tablet by mouth two (2) times a day. .    onabotulinumtoxinA (Botox) 200 unit injection INJECT 200 UNITS INTRAMUSCULARLY INTO HEAD AND NECK EVERY 3 MONTHS.     clonazePAM (KlonoPIN) 0.5 mg tablet TAKE 1 TABLET BY MOUTH TWICE DAILY AS NEEDED FOR ANXIETY OR INSOMNIA    rizatriptan (MAXALT-MLT) 10 mg disintegrating tablet TAKE 1 TABLETS BY MOUTH AT THE ONSET OF HEADACHE AND REPEAT IN 2 HOURS IF NEEDED. MAX OF 2 IN 24 HRS    Bacillus coagulans (Digestive Advantage Prob Gummy) 250 million cell chew Take  by mouth. fexofenadine-pseudoephedrine (ALLEGRA-D 24) 180-240 mg per tablet Take 1 Tab by mouth daily. No current facility-administered medications for this visit. Past Medical History:   Diagnosis Date    Acne     Allergic rhinitis 08/24/2016    shots Dr. Yao Duong    Arrhythmia     B12 deficiency 2/9/2017    Calculus of kidney     Headache     Migraine without aura and without status migrainosus, not intractable 01/19/2017    botox injections. Dr. Taisha Saavedra    Nephrolithiasis 8/24/2016      Past Surgical History:   Procedure Laterality Date    HX LITHOTRIPSY        Social History     Tobacco Use    Smoking status: Never    Smokeless tobacco: Never   Substance Use Topics    Alcohol use: Yes      Family History   Problem Relation Age of Onset    Cancer Father         Prostate    Headache Father     Heart Disease Father     Diabetes Father         Allergies   Allergen Reactions    Salicylic Acid-Sulfur Anaphylaxis    Sulfur Unknown (comments)        Assessment/Plan  Diagnoses and all orders for this visit:    1. Weight gain-recently has been relatively stable. Likely has a strong genetic factor for this. We will check thyroid and A1c today. Low threshold to try metformin or GLP-1 if she does show to have impaired fasting glucose. -     METABOLIC PANEL, COMPREHENSIVE; Future  -     HEMOGLOBIN A1C WITH EAG; Future  -     TSH 3RD GENERATION; Future    2. Encounter for medication monitoring  -     AMB POC ALERE ICUP DX DRUG SCREEN 10    3. Family history of diabetes mellitus  -     METABOLIC PANEL, COMPREHENSIVE; Future  -     HEMOGLOBIN A1C WITH EAG; Future  -     TSH 3RD GENERATION; Future    4. Anxiety-great improvements with low-dose Lexapro. Okay to stop Cymbalta altogether. If anxiety worsens will increase Lexapro to 10 mg  -     escitalopram oxalate (LEXAPRO) 5 mg tablet; Take 1 Tablet by mouth daily. 5. Panic attack-stable rarely needing benzodiazepine. Milena Boss MD  10/14/2022    This note was created with the help of speech recognition software Harjit Jon) and may contain some 'sound alike' errors.

## 2022-10-17 LAB
ALBUMIN SERPL-MCNC: 3.9 G/DL (ref 3.8–4.8)
ALBUMIN/GLOB SERPL: 1.2 {RATIO} (ref 1.2–2.2)
ALP SERPL-CCNC: 88 IU/L (ref 44–121)
ALT SERPL-CCNC: 8 IU/L (ref 0–32)
AST SERPL-CCNC: 14 IU/L (ref 0–40)
BILIRUB SERPL-MCNC: 0.2 MG/DL (ref 0–1.2)
BUN SERPL-MCNC: 12 MG/DL (ref 6–20)
BUN/CREAT SERPL: 17 (ref 9–23)
CALCIUM SERPL-MCNC: 9.7 MG/DL (ref 8.7–10.2)
CHLORIDE SERPL-SCNC: 99 MMOL/L (ref 96–106)
CO2 SERPL-SCNC: 19 MMOL/L (ref 20–29)
CREAT SERPL-MCNC: 0.7 MG/DL (ref 0.57–1)
EGFR: 116 ML/MIN/1.73
EST. AVERAGE GLUCOSE BLD GHB EST-MCNC: 117 MG/DL
GLOBULIN SER CALC-MCNC: 3.2 G/DL (ref 1.5–4.5)
GLUCOSE SERPL-MCNC: 113 MG/DL (ref 70–99)
HBA1C MFR BLD: 5.7 % (ref 4.8–5.6)
POTASSIUM SERPL-SCNC: 4.3 MMOL/L (ref 3.5–5.2)
PROT SERPL-MCNC: 7.1 G/DL (ref 6–8.5)
SODIUM SERPL-SCNC: 138 MMOL/L (ref 134–144)
TSH SERPL DL<=0.005 MIU/L-ACNC: 1.56 UIU/ML (ref 0.45–4.5)

## 2022-10-23 DIAGNOSIS — F41.9 ANXIETY: ICD-10-CM

## 2022-10-23 DIAGNOSIS — F41.0 PANIC ATTACK: ICD-10-CM

## 2022-10-23 RX ORDER — CLONAZEPAM 0.5 MG/1
TABLET ORAL
Qty: 60 TABLET | Refills: 0 | Status: SHIPPED | OUTPATIENT
Start: 2022-10-23

## 2022-10-31 DIAGNOSIS — R73.01 IFG (IMPAIRED FASTING GLUCOSE): Primary | ICD-10-CM

## 2022-10-31 RX ORDER — DULOXETIN HYDROCHLORIDE 20 MG/1
20 CAPSULE, DELAYED RELEASE ORAL DAILY
Qty: 90 CAPSULE | Refills: 1 | Status: SHIPPED | OUTPATIENT
Start: 2022-10-31

## 2022-10-31 RX ORDER — RIZATRIPTAN BENZOATE 10 MG/1
TABLET, ORALLY DISINTEGRATING ORAL
Qty: 9 TABLET | Refills: 5 | Status: SHIPPED | OUTPATIENT
Start: 2022-10-31 | End: 2022-11-01 | Stop reason: SDUPTHER

## 2022-11-01 RX ORDER — RIZATRIPTAN BENZOATE 10 MG/1
TABLET, ORALLY DISINTEGRATING ORAL
Qty: 9 TABLET | Refills: 5 | Status: SHIPPED | OUTPATIENT
Start: 2022-11-01

## 2022-11-01 RX ORDER — RIZATRIPTAN BENZOATE 10 MG/1
TABLET, ORALLY DISINTEGRATING ORAL
Qty: 9 TABLET | Refills: 5 | OUTPATIENT
Start: 2022-11-01

## 2022-11-07 ENCOUNTER — DOCUMENTATION ONLY (OUTPATIENT)
Dept: NEUROLOGY | Age: 32
End: 2022-11-07

## 2022-11-07 NOTE — PROGRESS NOTES
Running benefit verification through [employer]  Benefit Verification [id number] Submitted  BV Full submissions, please allow 24-48 hours for results. PER [employer].  No auth needed for Code [id number]

## 2022-11-10 RX ORDER — METFORMIN HYDROCHLORIDE 500 MG/1
1000 TABLET, EXTENDED RELEASE ORAL
Qty: 60 TABLET | Refills: 2 | Status: SHIPPED | OUTPATIENT
Start: 2022-11-10

## 2022-11-12 DIAGNOSIS — F90.0 ATTENTION DEFICIT HYPERACTIVITY DISORDER (ADHD), PREDOMINANTLY INATTENTIVE TYPE: ICD-10-CM

## 2022-11-14 RX ORDER — DEXTROAMPHETAMINE SACCHARATE, AMPHETAMINE ASPARTATE MONOHYDRATE, DEXTROAMPHETAMINE SULFATE AND AMPHETAMINE SULFATE 7.5; 7.5; 7.5; 7.5 MG/1; MG/1; MG/1; MG/1
CAPSULE, EXTENDED RELEASE ORAL
Qty: 30 CAPSULE | Refills: 0 | Status: SHIPPED | OUTPATIENT
Start: 2022-11-14

## 2022-11-15 ENCOUNTER — TELEPHONE (OUTPATIENT)
Dept: NEUROLOGY | Age: 32
End: 2022-11-15

## 2022-11-17 ENCOUNTER — TELEPHONE (OUTPATIENT)
Dept: NEUROLOGY | Age: 32
End: 2022-11-17

## 2022-11-18 NOTE — TELEPHONE ENCOUNTER
Luzma with [facility] called to set up shipment of Botox.   Should arrive 11/28/22 at [facility] location

## 2022-12-02 ENCOUNTER — OFFICE VISIT (OUTPATIENT)
Dept: NEUROLOGY | Age: 32
End: 2022-12-02
Payer: COMMERCIAL

## 2022-12-02 DIAGNOSIS — G43.719 CHRONIC MIGRAINE WITHOUT AURA, INTRACTABLE, WITHOUT STATUS MIGRAINOSUS: Primary | ICD-10-CM

## 2022-12-03 NOTE — PROGRESS NOTES
[facility]  OFFICE PROCEDURE PROGRESS NOTE        Chart reviewed for the following:   Chad HERNANDEZ NP, have reviewed the History, Physical and updated the Allergic reactions for [address] [facility] [address] performed immediately prior to start of procedure:   Chad HERNANDEZ NP, have performed the following reviews [facility] [id number] [facility] [address] prior to the start of the procedure:            * Patient was identified by name and date of birth   * Agreement on procedure being performed was verified  * Risks and Benefits explained to the patient  * Procedure site verified and marked as necessary  * Patient was positioned for comfort  * Consent was signed and verified     Time: 1500      Date of procedure: 12/2/2022    Procedure performed by:  Viridiana Pérez NP    Provider assisted by: None    Patient assisted by: None    How tolerated by patient: tolerated the procedure well with no complications    Post Procedural Pain Scale: 2 - Hurts Little Bit    Comments: None      Botox Injection Note       Indication: patient has chronic recurrent migraine, has 7-10 less migraine days per month with botox injections    Procedure:   Botox concentration: 200 units in 4 ml of preservative-free normal saline. 31 sites injections, distribution as follow      Units/site  Sites Sides Subtotal    Procerus 5 1 1 5    5 1 2 10   Frontalis 5 2 2 20   Temporalis 5 4 2 40   Occipitalis 5 3 2 30   Upper cervical paraspinalis 5 2 2 20   Trapezius 5 3 2 30         200 units Botox were reconstituted, 155 units injected as above and the remainder was unavoidably wasted.      Patient tolerated procedure well.     _____________________________   Kirk Hooper

## 2022-12-14 DIAGNOSIS — F90.0 ATTENTION DEFICIT HYPERACTIVITY DISORDER (ADHD), PREDOMINANTLY INATTENTIVE TYPE: ICD-10-CM

## 2022-12-14 RX ORDER — DEXTROAMPHETAMINE SACCHARATE, AMPHETAMINE ASPARTATE MONOHYDRATE, DEXTROAMPHETAMINE SULFATE AND AMPHETAMINE SULFATE 7.5; 7.5; 7.5; 7.5 MG/1; MG/1; MG/1; MG/1
30 CAPSULE, EXTENDED RELEASE ORAL
Qty: 30 CAPSULE | Refills: 0 | Status: SHIPPED | OUTPATIENT
Start: 2022-12-14

## 2022-12-16 ENCOUNTER — DOCUMENTATION ONLY (OUTPATIENT)
Dept: NEUROLOGY | Age: 32
End: 2022-12-16

## 2023-01-13 DIAGNOSIS — F90.0 ATTENTION DEFICIT HYPERACTIVITY DISORDER (ADHD), PREDOMINANTLY INATTENTIVE TYPE: ICD-10-CM

## 2023-01-15 RX ORDER — DEXTROAMPHETAMINE SACCHARATE, AMPHETAMINE ASPARTATE MONOHYDRATE, DEXTROAMPHETAMINE SULFATE AND AMPHETAMINE SULFATE 7.5; 7.5; 7.5; 7.5 MG/1; MG/1; MG/1; MG/1
30 CAPSULE, EXTENDED RELEASE ORAL
Qty: 30 CAPSULE | Refills: 0 | Status: SHIPPED | OUTPATIENT
Start: 2023-01-15

## 2023-02-04 DIAGNOSIS — F41.9 ANXIETY: ICD-10-CM

## 2023-02-04 RX ORDER — TRAZODONE HYDROCHLORIDE 100 MG/1
TABLET ORAL
Qty: 90 TABLET | Refills: 1 | Status: SHIPPED | OUTPATIENT
Start: 2023-02-04

## 2023-02-17 ENCOUNTER — TELEPHONE (OUTPATIENT)
Dept: NEUROLOGY | Age: 33
End: 2023-02-17

## 2023-02-17 NOTE — TELEPHONE ENCOUNTER
Re: botox    Reviewing schedule and see [facility] is wanting to schedule, called them and set up delivery for 02//21/23 AM delivery, sent update to nurse.

## 2023-02-19 DIAGNOSIS — F90.0 ATTENTION DEFICIT HYPERACTIVITY DISORDER (ADHD), PREDOMINANTLY INATTENTIVE TYPE: ICD-10-CM

## 2023-02-20 ENCOUNTER — TELEPHONE (OUTPATIENT)
Dept: NEUROLOGY | Age: 33
End: 2023-02-20

## 2023-02-22 RX ORDER — DEXTROAMPHETAMINE SACCHARATE, AMPHETAMINE ASPARTATE MONOHYDRATE, DEXTROAMPHETAMINE SULFATE AND AMPHETAMINE SULFATE 7.5; 7.5; 7.5; 7.5 MG/1; MG/1; MG/1; MG/1
30 CAPSULE, EXTENDED RELEASE ORAL
Qty: 30 CAPSULE | Refills: 0 | Status: SHIPPED | OUTPATIENT
Start: 2023-02-22

## 2023-02-24 ENCOUNTER — OFFICE VISIT (OUTPATIENT)
Dept: NEUROLOGY | Age: 33
End: 2023-02-24
Payer: COMMERCIAL

## 2023-02-24 DIAGNOSIS — G43.719 CHRONIC MIGRAINE WITHOUT AURA, INTRACTABLE, WITHOUT STATUS MIGRAINOSUS: Primary | ICD-10-CM

## 2023-02-24 PROCEDURE — 64615 CHEMODENERV MUSC MIGRAINE: CPT | Performed by: NURSE PRACTITIONER

## 2023-02-27 NOTE — PROGRESS NOTES
[facility]  OFFICE PROCEDURE PROGRESS NOTE        Chart reviewed for the following:   I, [de-identified] M JOE Villela, have reviewed the History, Physical and updated the Allergic reactions for [address] [facility] [address] performed immediately prior to start of procedure:   I, [de-identified] M JOE Villela, have performed the following reviews [facility] [id number] [facility] [address] prior to the start of the procedure:            * Patient was identified by name and date of birth   * Agreement on procedure being performed was verified  * Risks and Benefits explained to the patient  * Procedure site verified and marked as necessary  * Patient was positioned for comfort  * Consent was signed and verified     Time: 1500      Date of procedure: 2/27/2023    Procedure performed by:  Moon Douglass NP    Provider assisted by: None    Patient assisted by: None    How tolerated by patient: tolerated the procedure well with no complications    Post Procedural Pain Scale: 2 - Hurts Little Bit    Comments: None      Botox Injection Note       Indication: patient has chronic recurrent migraine, has 7-10 less migraine days per month with botox injections    Procedure:   Botox concentration: 200 units in 4 ml of preservative-free normal saline. 31 sites injections, distribution as follow      Units/site  Sites Sides Subtotal    Procerus 5 1 1 5    5 1 2 10   Frontalis 5 2 2 20   Temporalis 5 4 2 40   Occipitalis 5 3 2 30   Upper cervical paraspinalis 5 2 2 20   Trapezius 5 3 2 30         200 units Botox were reconstituted, 155 units injected as above and the remainder was unavoidably wasted.      Patient tolerated procedure well.     _____________________________   Umu Lemon

## 2023-03-25 DIAGNOSIS — F90.0 ATTENTION DEFICIT HYPERACTIVITY DISORDER (ADHD), PREDOMINANTLY INATTENTIVE TYPE: ICD-10-CM

## 2023-03-27 RX ORDER — DEXTROAMPHETAMINE SACCHARATE, AMPHETAMINE ASPARTATE MONOHYDRATE, DEXTROAMPHETAMINE SULFATE AND AMPHETAMINE SULFATE 7.5; 7.5; 7.5; 7.5 MG/1; MG/1; MG/1; MG/1
30 CAPSULE, EXTENDED RELEASE ORAL
Qty: 30 CAPSULE | Refills: 0 | Status: SHIPPED | OUTPATIENT
Start: 2023-03-27

## 2023-04-25 ENCOUNTER — OFFICE VISIT (OUTPATIENT)
Dept: INTERNAL MEDICINE CLINIC | Age: 33
End: 2023-04-25
Payer: COMMERCIAL

## 2023-04-25 VITALS
OXYGEN SATURATION: 98 % | RESPIRATION RATE: 16 BRPM | WEIGHT: 203.2 LBS | DIASTOLIC BLOOD PRESSURE: 89 MMHG | HEIGHT: 61 IN | BODY MASS INDEX: 38.36 KG/M2 | TEMPERATURE: 98.5 F | SYSTOLIC BLOOD PRESSURE: 134 MMHG | HEART RATE: 102 BPM

## 2023-04-25 DIAGNOSIS — Z83.3 FAMILY HISTORY OF DIABETES MELLITUS: ICD-10-CM

## 2023-04-25 DIAGNOSIS — R73.01 IFG (IMPAIRED FASTING GLUCOSE): Primary | ICD-10-CM

## 2023-04-25 DIAGNOSIS — R63.5 WEIGHT GAIN: ICD-10-CM

## 2023-04-25 DIAGNOSIS — F41.9 ANXIETY: ICD-10-CM

## 2023-04-25 DIAGNOSIS — E28.2 PCOS (POLYCYSTIC OVARIAN SYNDROME): ICD-10-CM

## 2023-04-25 LAB — HBA1C MFR BLD HPLC: 5.5 %

## 2023-04-25 PROCEDURE — 99214 OFFICE O/P EST MOD 30 MIN: CPT | Performed by: INTERNAL MEDICINE

## 2023-04-25 PROCEDURE — 83036 HEMOGLOBIN GLYCOSYLATED A1C: CPT | Performed by: INTERNAL MEDICINE

## 2023-04-25 RX ORDER — METFORMIN HYDROCHLORIDE 500 MG/1
2000 TABLET, EXTENDED RELEASE ORAL
Qty: 120 TABLET | Refills: 3 | Status: SHIPPED | OUTPATIENT
Start: 2023-04-25

## 2023-04-25 NOTE — PROGRESS NOTES
Yan Martinez is a 26 y.o. female who presents today for Follow-up (RM18// pt presenting today to discuss medications since recent diagnosis of PCOS.)  . She has a history of   Patient Active Problem List   Diagnosis Code    Nephrolithiasis N20.0    Allergic rhinitis J30.9    Family history of early CAD Z82.49    Migraine without aura and without status migrainosus, not intractable G43.009    B12 deficiency E53.8    Anxiety F41.9    Weight gain R63.5   . Today patient is here for follow up. Anxiety - now on low dose SSRI and duloxetine has been continued. On buspirone. Mental has been stable now for some time. Patient is seen for anxiety disorder. Current treatment includes SSRI, buspar, duloxetine and no other therapies. Ongoing symptoms include: palpitations. Patient denies: sweating, chest pain, dizziness, paresthesias, racing thoughts, feelings of losing control, difficulty concentrating, suicidal ideation. Denies substance or alcohol abuse. Reported side effects from the treatment: none. IFG/PCOS: Patient remains on metformin. We have prescribed her Ozempic but was not covered. Weight is stable. A1c today slightly improved. She is actually taking 1 metformin tablet daily. Discussed titrating her dose up to a goal of 2000 g/day. She still quite frustrated regarding her inability to lose weight. We discussed that her weight has stabilized with acute success. Could consider a GLP-1 once she reaches a max tolerable dose of metformin. ROS  Review of Systems   Constitutional:  Negative for chills, fever and weight loss. HENT:  Negative for congestion and sore throat. Eyes:  Negative for blurred vision, double vision and photophobia. Respiratory:  Negative for cough and shortness of breath. Cardiovascular:  Negative for chest pain, palpitations and leg swelling. Gastrointestinal:  Negative for abdominal pain, constipation, diarrhea, heartburn, nausea and vomiting. Genitourinary:  Negative for dysuria, frequency and urgency. Musculoskeletal:  Negative for joint pain and myalgias. Skin:  Negative for rash. Neurological: Negative. Negative for headaches. Endo/Heme/Allergies:  Does not bruise/bleed easily. Psychiatric/Behavioral:  Negative for memory loss and suicidal ideas. Nervous/anxious: Much better. Visit Vitals  /89 (BP 1 Location: Left upper arm, BP Patient Position: Sitting, BP Cuff Size: Large adult)   Pulse (!) 102   Temp 98.5 °F (36.9 °C) (Oral)   Resp 16   Ht 5' 1\" (1.549 m)   Wt 203 lb 3.2 oz (92.2 kg)   SpO2 98%   BMI 38.39 kg/m²       Physical Exam  Constitutional:       General: She is not in acute distress. Appearance: She is well-developed. HENT:      Head: Normocephalic and atraumatic. Neck:      Thyroid: No thyromegaly. Cardiovascular:      Rate and Rhythm: Normal rate and regular rhythm. Heart sounds: No murmur heard. Pulmonary:      Effort: Pulmonary effort is normal.      Breath sounds: Normal breath sounds. No wheezing. Abdominal:      General: Bowel sounds are normal. There is no distension. Palpations: Abdomen is soft. Musculoskeletal:      Cervical back: Normal range of motion and neck supple. Skin:     General: Skin is warm and dry. Neurological:      Mental Status: She is alert and oriented to person, place, and time. Cranial Nerves: No cranial nerve deficit. Psychiatric:         Behavior: Behavior normal.         Current Outpatient Medications   Medication Sig    metFORMIN ER (GLUCOPHAGE XR) 500 mg tablet Take 4 Tablets by mouth daily (with dinner). busPIRone (BUSPAR) 10 mg tablet TAKE 1 TABLET BY MOUTH TWICE DAILY    amphetamine-dextroamphetamine XR (ADDERALL XR) 30 mg XR capsule Take 1 Capsule by mouth every morning.  Max Daily Amount: 30 mg.    traZODone (DESYREL) 100 mg tablet TAKE 1 TABLET BY MOUTH EVERY NIGHT    escitalopram oxalate (LEXAPRO) 5 mg tablet TAKE 1 TABLET BY MOUTH IN THE MORNING    amphetamine-dextroamphetamine XR (ADDERALL XR) 30 mg XR capsule TAKE ONE CAPSULE BY MOUTH EVERY MORNING, MAX DAILY AMOUNT: 30 MG    rizatriptan (MAXALT-MLT) 10 mg disintegrating tablet DISSOLVE 1 TABLET ON THE TONGUE AT ONSET OF HEADACHE. REPEAT IN 2 HOURS AS NEEDED. MAX OF 2 IN 24 HOURS    DULoxetine (CYMBALTA) 20 mg capsule Take 1 Capsule by mouth daily. clonazePAM (KlonoPIN) 0.5 mg tablet TAKE 1 TABLET BY MOUTH TWICE DAILY AS NEEDED FOR ANXIETY OR INSOMNIA    Junel 1.5/30, 21, 1.5-30 mg-mcg tab Take 1 Tablet by mouth daily. amphetamine-dextroamphetamine XR (ADDERALL XR) 30 mg XR capsule Take 1 Capsule by mouth every morning. Max Daily Amount: 30 mg. Diclofenac Potassium (Cambia) 50 mg pwpk MIX AND DRINK 1 PACKET BY MOUTH AT ONSET OF HEADACHE. MAY REPEAT AGAIN IN 2 HOURS FOR 1 DOSE. MAX 2 DOSES IN 24 HOURS    propranoloL (INDERAL) 20 mg tablet Take 1 Tablet by mouth two (2) times a day. .    onabotulinumtoxinA (Botox) 200 unit injection INJECT 200 UNITS INTRAMUSCULARLY INTO HEAD AND NECK EVERY 3 MONTHS. Bacillus coagulans (Digestive Advantage Prob Gummy) 250 million cell chew Take  by mouth. fexofenadine-pseudoephedrine (ALLEGRA-D 24) 180-240 mg per tablet Take 1 Tablet by mouth daily. metFORMIN ER (GLUCOPHAGE XR) 500 mg tablet Take 2 Tablets by mouth daily (with dinner). semaglutide (OZEMPIC) 0.25 mg or 0.5 mg/dose (2 mg/1.5 ml) subq pen 0.25 mg once weekly for 4 weeks, then increase to 0.5 mg once weekly. (Patient not taking: Reported on 4/25/2023)     No current facility-administered medications for this visit. Past Medical History:   Diagnosis Date    Acne     Allergic rhinitis 08/24/2016    shots Dr. Oswaldo Munson    Arrhythmia     B12 deficiency 2/9/2017    Calculus of kidney     Headache     Migraine without aura and without status migrainosus, not intractable 01/19/2017    botox injections.   Dr. Alli Garay    Nephrolithiasis 8/24/2016      Past Surgical History:   Procedure Laterality Date    HX LITHOTRIPSY        Social History     Tobacco Use    Smoking status: Never    Smokeless tobacco: Never   Substance Use Topics    Alcohol use: Yes      Family History   Problem Relation Age of Onset    Cancer Father         Prostate    Headache Father     Heart Disease Father     Diabetes Father         Allergies   Allergen Reactions    Salicylic Acid-Sulfur Anaphylaxis    Sulfur Unknown (comments)        Assessment/Plan  Diagnoses and all orders for this visit:    1. IFG (impaired fasting glucose)-patient to titrate up her metformin to a goal of 2000 mg, or highest dose tolerable. Could consider GLP-1 after a month if symptoms persist inability to lose weight. We discussed that her weight stability is a very good sign and hopefully with higher dose metformin improvement in glycemic control, she is able to lose weight. -     metFORMIN ER (GLUCOPHAGE XR) 500 mg tablet; Take 4 Tablets by mouth daily (with dinner). 2. Family history of diabetes mellitus  -     AMB POC HEMOGLOBIN A1C  -     metFORMIN ER (GLUCOPHAGE XR) 500 mg tablet; Take 4 Tablets by mouth daily (with dinner). 3. PCOS (polycystic ovarian syndrome)  -     metFORMIN ER (GLUCOPHAGE XR) 500 mg tablet; Take 4 Tablets by mouth daily (with dinner). 4. Anxiety-much better and stable with current therapy    5. Weight gain-weight has            Stacia Watts MD  4/25/2023    This note was created with the help of speech recognition software Lauren Guaman) and may contain some 'sound alike' errors.

## 2023-04-29 ENCOUNTER — TELEPHONE (OUTPATIENT)
Dept: NEUROLOGY | Age: 33
End: 2023-04-29

## 2023-04-29 NOTE — TELEPHONE ENCOUNTER
Re: Botox    See PA termed, created new case in [facility]# , PA for Misha Giles is approved from 04/29/23-04/28/24 auth# [id number], [id number] no PA required. [facility] is [facility].

## 2023-05-02 DIAGNOSIS — F90.0 ATTENTION DEFICIT HYPERACTIVITY DISORDER (ADHD), PREDOMINANTLY INATTENTIVE TYPE: ICD-10-CM

## 2023-05-02 RX ORDER — DEXTROAMPHETAMINE SACCHARATE, AMPHETAMINE ASPARTATE MONOHYDRATE, DEXTROAMPHETAMINE SULFATE AND AMPHETAMINE SULFATE 7.5; 7.5; 7.5; 7.5 MG/1; MG/1; MG/1; MG/1
CAPSULE, EXTENDED RELEASE ORAL
Qty: 30 CAPSULE | Refills: 0 | Status: SHIPPED | OUTPATIENT
Start: 2023-05-02

## 2023-05-16 RX ORDER — ONABOTULINUMTOXINA 200 [USP'U]/1
INJECTION, POWDER, LYOPHILIZED, FOR SOLUTION INTRADERMAL; INTRAMUSCULAR
Qty: 1 EACH | Refills: 4 | Status: SHIPPED | OUTPATIENT
Start: 2023-05-16

## 2023-05-22 RX ORDER — DULOXETIN HYDROCHLORIDE 20 MG/1
CAPSULE, DELAYED RELEASE ORAL
Qty: 30 CAPSULE | Refills: 2 | Status: SHIPPED | OUTPATIENT
Start: 2023-05-22

## 2023-05-22 RX ORDER — PROPRANOLOL HYDROCHLORIDE 20 MG/1
TABLET ORAL
Qty: 180 TABLET | Refills: 2 | Status: SHIPPED | OUTPATIENT
Start: 2023-05-22

## 2023-05-25 ENCOUNTER — TELEPHONE (OUTPATIENT)
Age: 33
End: 2023-05-25

## 2023-05-29 RX ORDER — DICLOFENAC POTASSIUM 50 MG/1
POWDER, FOR SOLUTION ORAL
COMMUNITY
Start: 2022-09-02 | End: 2023-07-14 | Stop reason: SDUPTHER

## 2023-05-29 RX ORDER — ESCITALOPRAM OXALATE 5 MG/1
1 TABLET ORAL EVERY MORNING
COMMUNITY
Start: 2022-11-25

## 2023-05-29 RX ORDER — RIZATRIPTAN BENZOATE 10 MG/1
TABLET, ORALLY DISINTEGRATING ORAL
COMMUNITY
Start: 2022-11-01

## 2023-05-29 RX ORDER — DULOXETIN HYDROCHLORIDE 20 MG/1
20 CAPSULE, DELAYED RELEASE ORAL DAILY
COMMUNITY
Start: 2022-10-31

## 2023-05-29 RX ORDER — BUSPIRONE HYDROCHLORIDE 10 MG/1
1 TABLET ORAL 2 TIMES DAILY
COMMUNITY
Start: 2023-04-13

## 2023-05-29 RX ORDER — METFORMIN HYDROCHLORIDE 500 MG/1
2000 TABLET, EXTENDED RELEASE ORAL
COMMUNITY
Start: 2023-04-10

## 2023-05-29 RX ORDER — DEXTROAMPHETAMINE SACCHARATE, AMPHETAMINE ASPARTATE MONOHYDRATE, DEXTROAMPHETAMINE SULFATE AND AMPHETAMINE SULFATE 7.5; 7.5; 7.5; 7.5 MG/1; MG/1; MG/1; MG/1
1 CAPSULE, EXTENDED RELEASE ORAL EVERY MORNING
COMMUNITY
Start: 2022-10-03 | End: 2023-06-08 | Stop reason: SDUPTHER

## 2023-05-29 RX ORDER — CLONAZEPAM 0.5 MG/1
TABLET ORAL
COMMUNITY
Start: 2022-10-23

## 2023-05-29 RX ORDER — FEXOFENADINE HCL AND PSEUDOEPHEDRINE HCI 180; 240 MG/1; MG/1
1 TABLET, EXTENDED RELEASE ORAL DAILY
COMMUNITY

## 2023-05-29 RX ORDER — PROPRANOLOL HYDROCHLORIDE 20 MG/1
20 TABLET ORAL 2 TIMES DAILY
COMMUNITY
Start: 2022-04-07

## 2023-05-29 RX ORDER — NORETHINDRONE ACETATE AND ETHINYL ESTRADIOL .03; 1.5 MG/1; MG/1
1 TABLET ORAL DAILY
COMMUNITY
Start: 2022-09-11

## 2023-05-29 RX ORDER — TRAZODONE HYDROCHLORIDE 100 MG/1
1 TABLET ORAL NIGHTLY
COMMUNITY
Start: 2023-02-04

## 2023-05-30 NOTE — TELEPHONE ENCOUNTER
Tr French can you call and schedule her   She missed her last week   She can get in ASAP   BOTOX is here.  WILFRIDO Patel

## 2023-06-06 ENCOUNTER — TELEPHONE (OUTPATIENT)
Age: 33
End: 2023-06-06

## 2023-06-07 ENCOUNTER — TELEPHONE (OUTPATIENT)
Age: 33
End: 2023-06-07

## 2023-06-08 ENCOUNTER — PROCEDURE VISIT (OUTPATIENT)
Age: 33
End: 2023-06-08
Payer: COMMERCIAL

## 2023-06-08 DIAGNOSIS — F90.0 ATTENTION-DEFICIT HYPERACTIVITY DISORDER, PREDOMINANTLY INATTENTIVE TYPE: ICD-10-CM

## 2023-06-08 DIAGNOSIS — F90.0 ATTENTION-DEFICIT HYPERACTIVITY DISORDER, PREDOMINANTLY INATTENTIVE TYPE: Primary | ICD-10-CM

## 2023-06-08 DIAGNOSIS — G43.719 CHRONIC MIGRAINE WITHOUT AURA, INTRACTABLE, WITHOUT STATUS MIGRAINOSUS: Primary | ICD-10-CM

## 2023-06-08 PROCEDURE — 64615 CHEMODENERV MUSC MIGRAINE: CPT | Performed by: NURSE PRACTITIONER

## 2023-06-08 RX ORDER — DEXTROAMPHETAMINE SACCHARATE, AMPHETAMINE ASPARTATE MONOHYDRATE, DEXTROAMPHETAMINE SULFATE AND AMPHETAMINE SULFATE 7.5; 7.5; 7.5; 7.5 MG/1; MG/1; MG/1; MG/1
1 CAPSULE, EXTENDED RELEASE ORAL EVERY MORNING
Qty: 30 CAPSULE | Refills: 0 | Status: SHIPPED | OUTPATIENT
Start: 2023-06-08 | End: 2023-06-08 | Stop reason: SDUPTHER

## 2023-06-08 RX ORDER — DEXTROAMPHETAMINE SACCHARATE, AMPHETAMINE ASPARTATE MONOHYDRATE, DEXTROAMPHETAMINE SULFATE AND AMPHETAMINE SULFATE 7.5; 7.5; 7.5; 7.5 MG/1; MG/1; MG/1; MG/1
1 CAPSULE, EXTENDED RELEASE ORAL EVERY MORNING
Qty: 30 CAPSULE | Refills: 0 | Status: SHIPPED | OUTPATIENT
Start: 2023-08-06 | End: 2023-09-05

## 2023-06-08 RX ORDER — DEXTROAMPHETAMINE SACCHARATE, AMPHETAMINE ASPARTATE MONOHYDRATE, DEXTROAMPHETAMINE SULFATE AND AMPHETAMINE SULFATE 7.5; 7.5; 7.5; 7.5 MG/1; MG/1; MG/1; MG/1
1 CAPSULE, EXTENDED RELEASE ORAL EVERY MORNING
Qty: 30 CAPSULE | Refills: 0 | Status: SHIPPED | OUTPATIENT
Start: 2023-07-07 | End: 2023-06-08 | Stop reason: SDUPTHER

## 2023-06-08 NOTE — PROGRESS NOTES
OFFICE PROCEDURE PROGRESS NOTE        Chart reviewed for the following:   Payam MELCHOR APRN - NP, have reviewed the History, Physical and updated the Allergic reactions for [address] performed immediately prior to start of procedure:   Saumya MELCHOR APRN - NP, have performed the following reviews on Anne Huerta prior to the start of the procedure:            * Patient was identified by name and date of birth   * Agreement on procedure being performed was verified  * Risks and Benefits explained to the patient  * Procedure site verified and marked as necessary  * Patient was positioned for comfort  * Consent was signed and verified     Time: 1300      Date of procedure: 6/8/2023    Procedure performed by:  ANDREE Blanco NP    Provider assisted by: None    Patient assisted by: None    How tolerated by patient: tolerated the procedure well with no complications    Post Procedural Pain Scale: 2 - Hurts Little Bit    Comments: None    LOT:  [id number]  EXP: 02/2026          Botox Injection Note       Indication: patient has chronic recurrent migraine, has 7-10 less migraine days per month with botox injections    Procedure:   Botox concentration: 200 units in 4 ml of preservative-free normal saline. 31 sites injections, distribution as follow      Units/site  Sites Sides Subtotal    Procerus 5 1 1 5    5 1 2 10   Frontalis 5 2 2 20   Temporalis 5 4 2 40   Occipitalis 5 3 2 30   Upper cervical paraspinalis 5 2 2 20   Trapezius 5 3 2 30         200 units Botox were reconstituted, 155 units injected as above and the remainder was unavoidably wasted.      Patient tolerated procedure well.     _____________________________   Eunice Boyle

## 2023-06-30 NOTE — PATIENT INSTRUCTIONS
Learning About Living [place]  What is a living will? A living will is a legal form you use to write down the kind of care you want at the end of your life. It is used by the health professionals who will treat you if you aren't able to decide for yourself. If you put your wishes in writing, your loved ones and others will know what kind of care you want. They won't need to guess. This can ease your mind and be helpful to others. A living will is not the same as an estate or property will. An estate will explains what you want to happen with your money and property after you die. Is a living will a legal document? A living will is a legal document. Each state has its own laws about living [place]ales. If you move to another state, make sure that your living will is legal in the state where you now live. Or you might use a universal form that has been approved by many states. This kind of form can sometimes be completed and stored online. Your electronic copy will then be available wherever you have a connection to the Internet. In most cases, doctors will respect your wishes even if you have a form from a different state. · You don't need an  to complete a living will. But legal advice can be helpful if your state's laws are unclear, your health history is complicated, or your family can't agree on what should be in your living will. · You can change your living will at any time. Some people find that their wishes about end-of-life care change as their health changes. · In addition to making a living will, think about completing a medical power of  form. This form lets you name the person you want to make end-of-life treatment decisions for you (your \"health care agent\") if you're not able to. Many hospitals and nursing homes will give you the forms you need to complete a living will and a medical power of .   · Your living will is used only if you can't make or communicate decisions for yourself anymore. If you become able to make decisions again, you can accept or refuse any treatment, no matter what you wrote in your living will. · Your state may offer an online registry. This is a place where you can store your living will online so the doctors and nurses who need to treat you can find it right away. What should you think about when creating a living will? Talk about your end-of-life wishes with your family members and your doctor. Let them know what you want. That way the people making decisions for you won't be surprised by your choices. Think about these questions as you make your living will:  · Do you know enough about life support methods that might be used? If not, talk to your doctor so you know what might be done if you can't breathe on your own, your heart stops, or you're unable to swallow. · What things would you still want to be able to do after you receive life-support methods? Would you want to be able to walk? To speak? To eat on your own? To live without the help of machines? · If you have a choice, where do you want to be cared for? In your home? At a hospital or nursing home? · Do you want certain Baptist practices performed if you become very ill? · If you have a choice at the end of your life, where would you prefer to die? At home? In a hospital or nursing home? Somewhere else? · Would you prefer to be buried or cremated? · Do you want your organs to be donated after you die? What should you do with your living will? · Make sure that your family members and your health care agent have copies of your living will. · Give your doctor a copy of your living will to keep in your medical record. If you have more than one doctor, make sure that each one has a copy. · You may want to put a copy of your living will where it can be easily found. Where can you learn more? Go to http://lalit-rose.info/.   Enter F249 in the search box to learn more about \"Learning About Living [place]. \"  Current as of: August 8, 2016  Content Version: 11.3  © 5983-1726 [employer]. Care instructions adapted under license by [employer] (which disclaims liability or warranty for this information). If you have questions about a medical condition or this instruction, always ask your healthcare professional. [address] any warranty or liability for your use of this information. Advance Directives: Care Instructions  Your Care Instructions  An advance directive is a legal way to state your wishes at the end of your life. It tells your family and your doctor what to do if you can no longer say what you want. There are two main types of advance directives. You can change them any time that your wishes change. · A living will tells your family and your doctor your wishes about life support and other treatment. · A durable power of  for health care lets you name a person to make treatment decisions for you when you can't speak for yourself. This person is called a health care agent. If you do not have an advance directive, decisions about your medical care may be made by a doctor or a  who doesn't know you. It may help to think of an advance directive as a gift to the people who care for you. If you have one, they won't have to make tough decisions by themselves. Follow-up care is a key part of your treatment and safety. Be sure to make and go to all appointments, and call your doctor if you are having problems. It's also a good idea to know your test results and keep a list of the medicines you take. How can you care for yourself at home? · Discuss your wishes with your loved ones and your doctor. This way, there are no surprises. · Many states have a unique form. Or you might use a universal form that has been approved by many states. This kind of form can sometimes be completed and stored online.  Your electronic copy will then be available wherever you have a connection to the Internet. In most cases, doctors will respect your wishes even if you have a form from a different state. · You don't need a  to do an advance directive. But you may want to get legal advice. · Think about these questions when you prepare an advance directive:  ¨ Who do you want to make decisions about your medical care if you are not able to? Many people choose a family member or close friend. ¨ Do you know enough about life support methods that might be used? If not, talk to your doctor so you understand. ¨ What are you most afraid of that might happen? You might be afraid of having pain, losing your independence, or being kept alive by machines. ¨ Where would you prefer to die? Choices include your home, a hospital, or a nursing home. ¨ Would you like to have information about hospice care to support you and your family? ¨ Do you want to donate organs when you die? ¨ Do you want certain Sabianism practices performed before you die? If so, put your wishes in the advance directive. · Read your advance directive every year, and make changes as needed. When should you call for help? Be sure to contact your doctor if you have any questions. Where can you learn more? Go to http://lalit-rose.info/. Enter R264 in the search box to learn more about \"Advance Directives: Care Instructions. \"  Current as of: November 17, 2016  Content Version: 11.3  © 6666-0003 [employer]. Care instructions adapted under license by [employer] (which disclaims liability or warranty for this information). If you have questions about a medical condition or this instruction, always ask your healthcare professional. [place] [address] any warranty or liability for your use of this information.         botox due after July 19  [facility] [phone] No

## 2023-07-10 DIAGNOSIS — F90.0 ATTENTION-DEFICIT HYPERACTIVITY DISORDER, PREDOMINANTLY INATTENTIVE TYPE: ICD-10-CM

## 2023-07-11 DIAGNOSIS — F90.0 ATTENTION-DEFICIT HYPERACTIVITY DISORDER, PREDOMINANTLY INATTENTIVE TYPE: ICD-10-CM

## 2023-07-14 RX ORDER — DICLOFENAC POTASSIUM 50 MG/1
POWDER, FOR SOLUTION ORAL
Qty: 9 EACH | Refills: 5 | Status: SHIPPED | OUTPATIENT
Start: 2023-07-14

## 2023-08-07 RX ORDER — TRAZODONE HYDROCHLORIDE 100 MG/1
TABLET ORAL
Qty: 90 TABLET | Refills: 1 | Status: SHIPPED | OUTPATIENT
Start: 2023-08-07

## 2023-08-09 DIAGNOSIS — F90.0 ATTENTION-DEFICIT HYPERACTIVITY DISORDER, PREDOMINANTLY INATTENTIVE TYPE: ICD-10-CM

## 2023-08-15 ENCOUNTER — TELEPHONE (OUTPATIENT)
Age: 33
End: 2023-08-15

## 2023-08-17 RX ORDER — DULOXETIN HYDROCHLORIDE 20 MG/1
CAPSULE, DELAYED RELEASE ORAL
Qty: 30 CAPSULE | Refills: 2 | Status: SHIPPED | OUTPATIENT
Start: 2023-08-17

## 2023-08-17 RX ORDER — METFORMIN HYDROCHLORIDE 500 MG/1
TABLET, EXTENDED RELEASE ORAL
Qty: 60 TABLET | Refills: 2 | Status: SHIPPED | OUTPATIENT
Start: 2023-08-17

## 2023-08-29 ENCOUNTER — TELEPHONE (OUTPATIENT)
Age: 33
End: 2023-08-29

## 2023-08-29 NOTE — TELEPHONE ENCOUNTER
Called lalo and spoke with [facility] and she states they have tried to reach patient X 3 now and have not. I sent patient a my chart message and called and [facility] for her to call them or I will need to cancel the appt for Tuesday as there is a holiday.
0 = independent

## 2023-09-01 ENCOUNTER — PROCEDURE VISIT (OUTPATIENT)
Age: 33
End: 2023-09-01
Payer: COMMERCIAL

## 2023-09-01 ENCOUNTER — TELEPHONE (OUTPATIENT)
Age: 33
End: 2023-09-01

## 2023-09-01 DIAGNOSIS — G43.719 CHRONIC MIGRAINE WITHOUT AURA, INTRACTABLE, WITHOUT STATUS MIGRAINOSUS: Primary | ICD-10-CM

## 2023-09-01 DIAGNOSIS — F90.0 ATTENTION-DEFICIT HYPERACTIVITY DISORDER, PREDOMINANTLY INATTENTIVE TYPE: ICD-10-CM

## 2023-09-01 PROCEDURE — 64615 CHEMODENERV MUSC MIGRAINE: CPT | Performed by: NURSE PRACTITIONER

## 2023-09-01 NOTE — TELEPHONE ENCOUNTER
Lady Jones called in to inform that the script for Vyvanse 30mg needs to be changed from 90 day supply to a 30 day supply. Please contact with any questions.

## 2023-09-06 NOTE — PROGRESS NOTES
OFFICE PROCEDURE PROGRESS NOTE        Chart reviewed for the following:   Payam MELCHOR APRN - NP, have reviewed the History, Physical and updated the Allergic reactions for [address] performed immediately prior to start of procedure:   Brayan MELCHOR APRN - NP, have performed the following reviews on Mike Ross prior to the start of the procedure:            * Patient was identified by name and date of birth   * Agreement on procedure being performed was verified  * Risks and Benefits explained to the patient  * Procedure site verified and marked as necessary  * Patient was positioned for comfort  * Consent was signed and verified     Time: 1500      Date of procedure: 9/6/2023    Procedure performed by:  ANDREE Gallagher NP    Provider assisted by: None    Patient assisted by: None    How tolerated by patient: tolerated the procedure well with no complications    Post Procedural Pain Scale: 2 - Hurts Little Bit    Comments: None  Botox Injection Note       Indication: patient has chronic recurrent migraine, has 7-10 less migraine days per month with botox injections    Procedure:   Botox concentration: 200 units in 4 ml of preservative-free normal saline. 31 sites injections, distribution as follow      Units/site  Sites Sides Subtotal    Procerus 5 1 1 5    5 1 2 10   Frontalis 5 2 2 20   Temporalis 5 4 2 40   Occipitalis 5 3 2 30   Upper cervical paraspinalis 5 2 2 20   Trapezius 5 3 2 30         200 units Botox were reconstituted, 155 units injected as above and the remainder was unavoidably wasted.      Patient tolerated procedure well.     ____________________________[facility]

## 2023-09-11 RX ORDER — ESCITALOPRAM OXALATE 5 MG/1
5 TABLET ORAL EVERY MORNING
Qty: 90 TABLET | Refills: 1 | Status: SHIPPED | OUTPATIENT
Start: 2023-09-11

## 2023-10-09 DIAGNOSIS — F90.0 ATTENTION-DEFICIT HYPERACTIVITY DISORDER, PREDOMINANTLY INATTENTIVE TYPE: ICD-10-CM

## 2023-10-09 RX ORDER — LISDEXAMFETAMINE DIMESYLATE CAPSULES 30 MG/1
30 CAPSULE ORAL EVERY MORNING
Qty: 30 CAPSULE | Refills: 0 | Status: SHIPPED | OUTPATIENT
Start: 2023-10-09 | End: 2023-11-08

## 2023-11-08 DIAGNOSIS — F90.0 ATTENTION-DEFICIT HYPERACTIVITY DISORDER, PREDOMINANTLY INATTENTIVE TYPE: ICD-10-CM

## 2023-11-08 RX ORDER — LISDEXAMFETAMINE DIMESYLATE CAPSULES 30 MG/1
30 CAPSULE ORAL EVERY MORNING
Qty: 30 CAPSULE | Refills: 0 | Status: SHIPPED | OUTPATIENT
Start: 2023-11-08 | End: 2023-11-08 | Stop reason: SDUPTHER

## 2023-11-08 RX ORDER — LISDEXAMFETAMINE DIMESYLATE CAPSULES 30 MG/1
30 CAPSULE ORAL EVERY MORNING
Qty: 30 CAPSULE | Refills: 0 | Status: SHIPPED | OUTPATIENT
Start: 2023-11-08 | End: 2023-12-08

## 2023-11-15 ENCOUNTER — TELEPHONE (OUTPATIENT)
Age: 33
End: 2023-11-15

## 2023-11-16 RX ORDER — DULOXETIN HYDROCHLORIDE 20 MG/1
CAPSULE, DELAYED RELEASE ORAL
Qty: 30 CAPSULE | Refills: 2 | Status: SHIPPED | OUTPATIENT
Start: 2023-11-16

## 2023-11-22 ENCOUNTER — TELEPHONE (OUTPATIENT)
Age: 33
End: 2023-11-22

## 2023-11-28 ENCOUNTER — TELEPHONE (OUTPATIENT)
Age: 33
End: 2023-11-28

## 2023-11-28 ENCOUNTER — PROCEDURE VISIT (OUTPATIENT)
Age: 33
End: 2023-11-28
Payer: COMMERCIAL

## 2023-11-28 DIAGNOSIS — G43.719 CHRONIC MIGRAINE WITHOUT AURA, INTRACTABLE, WITHOUT STATUS MIGRAINOSUS: Primary | ICD-10-CM

## 2023-11-28 PROCEDURE — 64615 CHEMODENERV MUSC MIGRAINE: CPT | Performed by: NURSE PRACTITIONER

## 2023-11-28 NOTE — TELEPHONE ENCOUNTER
Drug Acquisition: Buy and Bill  Drug: Botox 200 units, Dx: G43.719  Insurance: [place]  Submission Type: Availity  Lists of hospitals in the [place]:   Request Tracking ID: [id number]  Reference #: [id number]  Approval Range: 11/28/23 to 11/26/24    Approval letter scanned to media.    FYI to Murali

## 2023-11-29 NOTE — PROGRESS NOTES
OFFICE PROCEDURE PROGRESS NOTE        Chart reviewed for the following:   Payam MELCHOR APRN - NP, have reviewed the History, Physical and updated the Allergic reactions for [address] performed immediately prior to start of procedure:   Savanah MELCHOR APRN - NP, have performed the following reviews on Sheri Jay prior to the start of the procedure:            * Patient was identified by name and date of birth   * Agreement on procedure being performed was verified  * Risks and Benefits explained to the patient  * Procedure site verified and marked as necessary  * Patient was positioned for comfort  * Consent was signed and verified     Time: 1500      Date of procedure: 11/28/2023    Procedure performed by:  ANDREE Maldonado NP    Provider assisted by: None    Patient assisted by: None    How tolerated by patient: tolerated the procedure well with no complications    Post Procedural Pain Scale: 2 - Hurts Little Bit    Comments: None    [facility]:[id number]  EXP: 02/2026          Botox Injection Note       Indication: patient has chronic recurrent migraine, has 7-10 less migraine days per month with botox injections    Procedure:   Botox concentration: 200 units in 4 ml of preservative-free normal saline. 31 sites injections, distribution as follow      Units/site  Sites Sides Subtotal    Procerus 5 1 1 5    5 1 2 10   Frontalis 5 2 2 20   Temporalis 5 4 2 40   Occipitalis 5 3 2 30   Upper cervical paraspinalis 5 2 2 20   Trapezius 5 3 2 30         200 units Botox were reconstituted, 155 units injected as above and the remainder was unavoidably wasted.      Patient tolerated procedure well.     _____________________________   Cayla Valdes

## 2023-12-11 DIAGNOSIS — F90.0 ATTENTION-DEFICIT HYPERACTIVITY DISORDER, PREDOMINANTLY INATTENTIVE TYPE: ICD-10-CM

## 2023-12-11 RX ORDER — LISDEXAMFETAMINE DIMESYLATE CAPSULES 30 MG/1
30 CAPSULE ORAL EVERY MORNING
Qty: 30 CAPSULE | Refills: 0 | Status: SHIPPED | OUTPATIENT
Start: 2023-12-11 | End: 2024-01-10

## 2024-01-09 RX ORDER — DULOXETIN HYDROCHLORIDE 20 MG/1
CAPSULE, DELAYED RELEASE ORAL
Qty: 90 CAPSULE | Refills: 0 | Status: SHIPPED | OUTPATIENT
Start: 2024-01-09

## 2024-01-10 DIAGNOSIS — F90.0 ATTENTION-DEFICIT HYPERACTIVITY DISORDER, PREDOMINANTLY INATTENTIVE TYPE: ICD-10-CM

## 2024-01-10 RX ORDER — LISDEXAMFETAMINE DIMESYLATE CAPSULES 30 MG/1
30 CAPSULE ORAL EVERY MORNING
Qty: 30 CAPSULE | Refills: 0 | Status: SHIPPED | OUTPATIENT
Start: 2024-01-10 | End: 2024-02-09

## 2024-01-15 ENCOUNTER — TELEPHONE (OUTPATIENT)
Age: 34
End: 2024-01-15

## 2024-01-15 NOTE — TELEPHONE ENCOUNTER
----- Message from Jayden Sanchez sent at 1/15/2024  1:41 PM EST -----  Regarding: Prior Authorization   Contact: [phone]  Hi Payam!   Kathy received the prescription, but said my insurance was requesting a prior-authorization.    I spoke to the insurance company. They said if you guys submitted the prior authorization  last week, the system was down, and it will need to be resubmitted. They gave me a phone number for your office to call. They also said that you should submit it as urgent, since I have already run out of the medication, and it takes up to 5 business days.     Thank you so much!   Jayden     Prior authorization number   [id number]  Submit as urgent

## 2024-01-23 ENCOUNTER — TELEPHONE (OUTPATIENT)
Age: 34
End: 2024-01-23

## 2024-01-23 NOTE — TELEPHONE ENCOUNTER
The patient sent a message about the VYVANSE 30 MG PA       hi, I just called to check on the status, and here is the information I was given.      The prior authorization that they received was aborted, they couldn’t tell me whether or not it was on your end, or the insurance’s end, said it need to be resubmitted as urgent again because I am out of the medication. Resubmit via [email or web address]      Also that in the PA, State why medication is a medical necessity      Here is a phone number   [phone]     Not sure what I can do to help but let me know if there is anyway that I can.  Thank you,  Jayden

## 2024-01-25 ENCOUNTER — TELEPHONE (OUTPATIENT)
Age: 34
End: 2024-01-25

## 2024-01-25 NOTE — TELEPHONE ENCOUNTER
RE: Vyvanse    Approved: January 25, 2024 to January 24, 2025     Key: [id number]    Awaiting approval letter    Fyi to nurse.

## 2024-02-29 DIAGNOSIS — F90.0 ATTENTION-DEFICIT HYPERACTIVITY DISORDER, PREDOMINANTLY INATTENTIVE TYPE: ICD-10-CM

## 2024-02-29 RX ORDER — LISDEXAMFETAMINE DIMESYLATE CAPSULES 30 MG/1
30 CAPSULE ORAL EVERY MORNING
Qty: 30 CAPSULE | Refills: 0 | Status: SHIPPED | OUTPATIENT
Start: 2024-02-29 | End: 2024-03-30

## 2024-03-01 ENCOUNTER — PROCEDURE VISIT (OUTPATIENT)
Age: 34
End: 2024-03-01

## 2024-03-01 DIAGNOSIS — G43.719 CHRONIC MIGRAINE WITHOUT AURA, INTRACTABLE, WITHOUT STATUS MIGRAINOSUS: Primary | ICD-10-CM

## 2024-03-01 RX ORDER — RIZATRIPTAN BENZOATE 10 MG/1
TABLET, ORALLY DISINTEGRATING ORAL
Qty: 9 TABLET | Refills: 4 | Status: SHIPPED | OUTPATIENT
Start: 2024-03-01

## 2024-03-01 NOTE — PROGRESS NOTES
OFFICE PROCEDURE PROGRESS NOTE        Chart reviewed for the following:   Francisco MELCHOR APRN - NP, have reviewed the History, Physical and updated the Allergic reactions for Jayden Sanchez     TIME OUT performed immediately prior to start of procedure:   Francisco MELCHOR APRN - NP, have performed the following reviews on Jayden Sanchez prior to the start of the procedure:            * Patient was identified by name and date of birth   * Agreement on procedure being performed was verified  * Risks and Benefits explained to the patient  * Procedure site verified and marked as necessary  * Patient was positioned for comfort  * Consent was signed and verified     Time: 0920      Date of procedure: 3/1/2024    Procedure performed by:  ANDREE Ross NP    Provider assisted by: None    Patient assisted by: None    How tolerated by patient: tolerated the procedure well with no complications    Post Procedural Pain Scale: 2 - Hurts Little Bit    Comments: None      Botox Injection Note       Indication: patient has chronic recurrent migraine, has 7-10 less migraine days per month with botox injections    Procedure:   Botox concentration: 200 units in 4 ml of preservative-free normal saline.   31 sites injections, distribution as follow      Units/site  Sites Sides Subtotal    Procerus 5 1 1 5    5 1 2 10   Frontalis 5 2 2 20   Temporalis 5 4 2 40   Occipitalis 5 3 2 30   Upper cervical paraspinalis 5 2 2 20   Trapezius 5 3 2 30         200 units Botox were reconstituted, 155 units injected as above and the remainder was unavoidably wasted.     Patient tolerated procedure well.     _____________________________   FRANCISCO SOLORZANO

## 2024-03-11 DIAGNOSIS — F90.0 ATTENTION-DEFICIT HYPERACTIVITY DISORDER, PREDOMINANTLY INATTENTIVE TYPE: ICD-10-CM

## 2024-03-11 RX ORDER — LISDEXAMFETAMINE DIMESYLATE CAPSULES 30 MG/1
30 CAPSULE ORAL EVERY MORNING
Qty: 30 CAPSULE | Refills: 0 | Status: SHIPPED | OUTPATIENT
Start: 2024-03-11 | End: 2024-04-10

## 2024-04-08 DIAGNOSIS — F90.0 ATTENTION-DEFICIT HYPERACTIVITY DISORDER, PREDOMINANTLY INATTENTIVE TYPE: Primary | ICD-10-CM

## 2024-04-08 RX ORDER — DULOXETIN HYDROCHLORIDE 20 MG/1
CAPSULE, DELAYED RELEASE ORAL
Qty: 90 CAPSULE | Refills: 0 | OUTPATIENT
Start: 2024-04-08

## 2024-04-08 RX ORDER — LISDEXAMFETAMINE DIMESYLATE CAPSULES 30 MG/1
30 CAPSULE ORAL EVERY MORNING
Qty: 30 CAPSULE | Refills: 0 | Status: SHIPPED | OUTPATIENT
Start: 2024-04-08 | End: 2024-05-08

## 2024-04-30 ENCOUNTER — TELEPHONE (OUTPATIENT)
Age: 34
End: 2024-04-30

## 2024-04-30 NOTE — TELEPHONE ENCOUNTER
called and LVM stating the appt had to be moved to 5/24/24 at 850 or she could call for the next opening after the 24th of may

## 2024-05-08 DIAGNOSIS — F90.0 ATTENTION-DEFICIT HYPERACTIVITY DISORDER, PREDOMINANTLY INATTENTIVE TYPE: ICD-10-CM

## 2024-05-08 RX ORDER — LISDEXAMFETAMINE DIMESYLATE 30 MG/1
30 CAPSULE ORAL EVERY MORNING
Qty: 30 CAPSULE | Refills: 0 | Status: SHIPPED | OUTPATIENT
Start: 2024-05-08 | End: 2024-06-07

## 2024-05-08 NOTE — TELEPHONE ENCOUNTER
----- Message from Jayden Sanchez sent at 5/8/2024  7:22 AM EDT -----  Regarding: Vyvanse  Contact: [phone]  Nabeel Hare,   I am in need of a refill of the Vyvanse. Please send it to [facility] again.   Thank you!   Jayden

## 2024-05-14 ENCOUNTER — TELEPHONE (OUTPATIENT)
Age: 34
End: 2024-05-14

## 2024-05-14 NOTE — TELEPHONE ENCOUNTER
Received call from patient today that her appt. Has been moved to 05.16.24. The Rx requires PA through patient's Rx ins. If the patient is going to \"buy and bill\", this needs to be documented. Pls notify [facility] if this is the case. Otherwise PA must be pursued with Rx ins.    Questions   Pls call [phone]  [facility]

## 2024-05-16 ENCOUNTER — OFFICE VISIT (OUTPATIENT)
Age: 34
End: 2024-05-16

## 2024-05-16 DIAGNOSIS — G43.719 CHRONIC MIGRAINE WITHOUT AURA, INTRACTABLE, WITHOUT STATUS MIGRAINOSUS: Primary | ICD-10-CM

## 2024-05-16 RX ORDER — DICLOFENAC POTASSIUM 50 MG/1
POWDER, FOR SOLUTION ORAL
Qty: 9 EACH | Refills: 5 | Status: SHIPPED | OUTPATIENT
Start: 2024-05-16

## 2024-05-16 NOTE — PROGRESS NOTES
OFFICE PROCEDURE PROGRESS NOTE        Chart reviewed for the following:   Francisco MELCHOR APRN - NP, have reviewed the History, Physical and updated the Allergic reactions for Jayden Sanchez     TIME OUT performed immediately prior to start of procedure:   Francisco MELCHOR APRN - NP, have performed the following reviews on Jayden Sanchez prior to the start of the procedure:            * Patient was identified by name and date of birth   * Agreement on procedure being performed was verified  * Risks and Benefits explained to the patient  * Procedure site verified and marked as necessary  * Patient was positioned for comfort  * Consent was signed and verified     Time: 1000      Date of procedure: 5/16/2024    Procedure performed by:  ANDREE Ross NP    Provider assisted by: None    Patient assisted by: None    How tolerated by patient: tolerated the procedure well with no complications    Post Procedural Pain Scale: 2 - Hurts Little Bit    Comments: None            Botox Injection Note       Indication: patient has chronic recurrent migraine, has 7-10 less migraine days per month with botox injections    Procedure:   Botox concentration: 200 units in 4 ml of preservative-free normal saline.   31 sites injections, distribution as follow      Units/site  Sites Sides Subtotal    Procerus 5 1 1 5    5 1 2 10   Frontalis 5 2 2 20   Temporalis 5 4 2 40   Occipitalis 5 3 2 30   Upper cervical paraspinalis 5 2 2 20   Trapezius 5 3 2 30         200 units Botox were reconstituted, 155 units injected as above and the remainder was unavoidably wasted.     Patient tolerated procedure well.     _____________________________   FRANCISCO SOLORZANO

## 2024-05-24 ENCOUNTER — TELEPHONE (OUTPATIENT)
Age: 34
End: 2024-05-24

## 2024-05-24 NOTE — TELEPHONE ENCOUNTER
RE:Diclofenac      [facility] notification that medication has been approved    Authorized from May 17, 2024 to May 17, 2025     Letter in media  Nurse notified

## 2024-06-06 DIAGNOSIS — F90.0 ATTENTION-DEFICIT HYPERACTIVITY DISORDER, PREDOMINANTLY INATTENTIVE TYPE: ICD-10-CM

## 2024-06-06 RX ORDER — LISDEXAMFETAMINE DIMESYLATE 30 MG/1
30 CAPSULE ORAL EVERY MORNING
Qty: 30 CAPSULE | Refills: 0 | Status: SHIPPED | OUTPATIENT
Start: 2024-06-06 | End: 2024-07-06

## 2024-06-06 NOTE — TELEPHONE ENCOUNTER
----- Message from Jayden Sanchez sent at 6/6/2024  7:55 AM EDT -----  Regarding: Vyvanse  Contact: [phone]  Nabeel Hare!     I hope the girls’ Summer is off to a good start! I am due for a refill on my Vyvanse, could you send it to [facility]?     Thank you!   Jayden

## 2024-07-07 DIAGNOSIS — F90.0 ATTENTION-DEFICIT HYPERACTIVITY DISORDER, PREDOMINANTLY INATTENTIVE TYPE: ICD-10-CM

## 2024-07-07 RX ORDER — LISDEXAMFETAMINE DIMESYLATE 30 MG/1
30 CAPSULE ORAL EVERY MORNING
Qty: 30 CAPSULE | Refills: 0 | Status: CANCELLED | OUTPATIENT
Start: 2024-07-07 | End: 2024-08-06

## 2024-07-08 RX ORDER — LISDEXAMFETAMINE DIMESYLATE 30 MG/1
30 CAPSULE ORAL DAILY
Qty: 30 CAPSULE | Refills: 0 | Status: SHIPPED | OUTPATIENT
Start: 2024-09-06 | End: 2024-10-06

## 2024-07-08 RX ORDER — LISDEXAMFETAMINE DIMESYLATE 30 MG/1
30 CAPSULE ORAL DAILY
Qty: 30 CAPSULE | Refills: 0 | Status: SHIPPED | OUTPATIENT
Start: 2024-08-07 | End: 2024-09-06

## 2024-07-08 RX ORDER — LISDEXAMFETAMINE DIMESYLATE 30 MG/1
30 CAPSULE ORAL DAILY
Qty: 30 CAPSULE | Refills: 0 | Status: SHIPPED | OUTPATIENT
Start: 2024-07-08 | End: 2024-08-07

## 2024-08-06 DIAGNOSIS — F90.0 ATTENTION-DEFICIT HYPERACTIVITY DISORDER, PREDOMINANTLY INATTENTIVE TYPE: ICD-10-CM

## 2024-08-09 DIAGNOSIS — F90.0 ATTENTION-DEFICIT HYPERACTIVITY DISORDER, PREDOMINANTLY INATTENTIVE TYPE: ICD-10-CM

## 2024-08-09 RX ORDER — LISDEXAMFETAMINE DIMESYLATE 30 MG/1
30 CAPSULE ORAL EVERY MORNING
Qty: 30 CAPSULE | Refills: 0 | Status: SHIPPED | OUTPATIENT
Start: 2024-08-09 | End: 2024-09-08

## 2024-08-12 RX ORDER — LISDEXAMFETAMINE DIMESYLATE 30 MG/1
30 CAPSULE ORAL DAILY
Qty: 30 CAPSULE | Refills: 0 | OUTPATIENT
Start: 2024-08-12 | End: 2024-09-11

## 2024-08-15 ENCOUNTER — OFFICE VISIT (OUTPATIENT)
Age: 34
End: 2024-08-15
Payer: COMMERCIAL

## 2024-08-15 DIAGNOSIS — G43.719 CHRONIC MIGRAINE WITHOUT AURA, INTRACTABLE, WITHOUT STATUS MIGRAINOSUS: Primary | ICD-10-CM

## 2024-08-15 DIAGNOSIS — M79.18 MYOFASCIAL PAIN: ICD-10-CM

## 2024-08-15 PROCEDURE — 64615 CHEMODENERV MUSC MIGRAINE: CPT | Performed by: NURSE PRACTITIONER

## 2024-08-15 RX ORDER — DICLOFENAC POTASSIUM 50 MG/1
POWDER, FOR SOLUTION ORAL
Qty: 9 EACH | Refills: 4 | Status: SHIPPED | OUTPATIENT
Start: 2024-08-15

## 2024-08-15 RX ORDER — METAXALONE 800 MG/1
TABLET ORAL
Qty: 60 TABLET | Refills: 4 | Status: SHIPPED | OUTPATIENT
Start: 2024-08-15

## 2024-08-20 RX ORDER — DICLOFENAC POTASSIUM 50 MG/1
POWDER, FOR SOLUTION ORAL
Qty: 9 EACH | Refills: 5 | Status: SHIPPED | OUTPATIENT
Start: 2024-08-20

## 2024-08-26 ENCOUNTER — TELEPHONE (OUTPATIENT)
Age: 34
End: 2024-08-26

## 2024-08-26 NOTE — TELEPHONE ENCOUNTER
RE:Metaxalone      Case submitted via EPIC was approved 08/21/2024-08/21/2025.    Letter in media  Nurse notified

## 2024-10-10 DIAGNOSIS — F90.0 ATTENTION-DEFICIT HYPERACTIVITY DISORDER, PREDOMINANTLY INATTENTIVE TYPE: ICD-10-CM

## 2024-10-10 RX ORDER — LISDEXAMFETAMINE DIMESYLATE 30 MG/1
30 CAPSULE ORAL EVERY MORNING
Qty: 30 CAPSULE | Refills: 0 | Status: SHIPPED | OUTPATIENT
Start: 2024-10-10 | End: 2024-11-09

## 2024-11-13 DIAGNOSIS — F90.0 ATTENTION-DEFICIT HYPERACTIVITY DISORDER, PREDOMINANTLY INATTENTIVE TYPE: ICD-10-CM

## 2024-11-14 ENCOUNTER — OFFICE VISIT (OUTPATIENT)
Age: 34
End: 2024-11-14

## 2024-11-14 DIAGNOSIS — G43.719 CHRONIC MIGRAINE WITHOUT AURA, INTRACTABLE, WITHOUT STATUS MIGRAINOSUS: ICD-10-CM

## 2024-11-14 DIAGNOSIS — M79.18 MYOFASCIAL PAIN: Primary | ICD-10-CM

## 2024-11-14 DIAGNOSIS — F90.0 ATTENTION-DEFICIT HYPERACTIVITY DISORDER, PREDOMINANTLY INATTENTIVE TYPE: ICD-10-CM

## 2024-11-14 RX ORDER — DICLOFENAC POTASSIUM 50 MG/1
POWDER, FOR SOLUTION ORAL
Qty: 9 EACH | Refills: 5 | Status: SHIPPED | OUTPATIENT
Start: 2024-11-14

## 2024-11-15 ENCOUNTER — TELEPHONE (OUTPATIENT)
Age: 34
End: 2024-11-15

## 2024-11-15 RX ORDER — LISDEXAMFETAMINE DIMESYLATE 30 MG/1
30 CAPSULE ORAL DAILY
Qty: 30 CAPSULE | Refills: 0 | Status: SHIPPED | OUTPATIENT
Start: 2024-11-15 | End: 2024-12-15

## 2024-11-15 NOTE — TELEPHONE ENCOUNTER
HIPAA Verified (if caller is someone other than patient):           Reason for Call: Refill    Medication Name:   Data Symmetry    Pharmacy (if different from pharmacy on file):   [facility] - [phone]    Prescribing Provider:   Sagar    Last Office Visit (must be within last 12 months - if not, schedule appointment then send refill encounter):   11/14/24     Is patient completely out of medication?   Yes        Message: (any additional details from patient/caller not covered above)          Level 1 Calls - attempted to reach practice?         Reason Call Marked High Priority (if applicable):

## 2024-11-15 NOTE — PROGRESS NOTES
OFFICE PROCEDURE PROGRESS NOTE        Chart reviewed for the following:   Francisco MELCHOR APRN - NP, have reviewed the History, Physical and updated the Allergic reactions for Jayden Sanchez     TIME OUT performed immediately prior to start of procedure:   Francisco MELCHOR APRN - NP, have performed the following reviews on Jayden Sanchez prior to the start of the procedure:            * Patient was identified by name and date of birth   * Agreement on procedure being performed was verified  * Risks and Benefits explained to the patient  * Procedure site verified and marked as necessary  * Patient was positioned for comfort  * Consent was signed and verified     Time: 1530      Date of procedure: 11/15/2024    Procedure performed by:  ANDREE Ross NP    Provider assisted by: None    Patient assisted by: None    How tolerated by patient: tolerated the procedure well with no complications    Post Procedural Pain Scale: 2 - Hurts Little Bit    Comments: None        Botox Injection Note       Indication: patient has chronic recurrent migraine, has 7-10 less migraine days per month with botox injections    Procedure:   Botox concentration: 200 units in 4 ml of preservative-free normal saline.   31 sites injections, distribution as follow      Units/site  Sites Sides Subtotal    Procerus 5 1 1 5    5 1 2 10   Frontalis 5 2 2 20   Temporalis 5 4 2 40   Occipitalis 5 3 2 30   Upper cervical paraspinalis 5 2 2 20   Trapezius 5 3 2 30         200 units Botox were reconstituted, 155 units injected as above and the remainder was unavoidably wasted.     Patient tolerated procedure well.     _____________________________   FRANCISCO SOLORZANO

## 2024-11-19 RX ORDER — LISDEXAMFETAMINE DIMESYLATE 30 MG/1
30 CAPSULE ORAL EVERY MORNING
Qty: 30 CAPSULE | Refills: 0 | OUTPATIENT
Start: 2024-11-19 | End: 2024-12-19

## 2024-12-16 ENCOUNTER — TELEPHONE (OUTPATIENT)
Age: 34
End: 2024-12-16

## 2024-12-16 DIAGNOSIS — F90.0 ATTENTION-DEFICIT HYPERACTIVITY DISORDER, PREDOMINANTLY INATTENTIVE TYPE: ICD-10-CM

## 2024-12-16 RX ORDER — LISDEXAMFETAMINE DIMESYLATE 30 MG/1
30 CAPSULE ORAL DAILY
Qty: 30 CAPSULE | Refills: 0 | OUTPATIENT
Start: 2024-12-16 | End: 2025-01-15

## 2024-12-16 RX ORDER — LISDEXAMFETAMINE DIMESYLATE 30 MG/1
30 CAPSULE ORAL DAILY
Qty: 30 CAPSULE | Refills: 0 | Status: SHIPPED | OUTPATIENT
Start: 2024-12-16 | End: 2024-12-17 | Stop reason: SDUPTHER

## 2024-12-17 ENCOUNTER — PATIENT MESSAGE (OUTPATIENT)
Age: 34
End: 2024-12-17

## 2024-12-17 DIAGNOSIS — F90.0 ATTENTION-DEFICIT HYPERACTIVITY DISORDER, PREDOMINANTLY INATTENTIVE TYPE: ICD-10-CM

## 2024-12-17 RX ORDER — LISDEXAMFETAMINE DIMESYLATE 30 MG/1
30 CAPSULE ORAL DAILY
Qty: 30 CAPSULE | Refills: 0 | Status: SHIPPED | OUTPATIENT
Start: 2024-12-17 | End: 2024-12-19 | Stop reason: SDUPTHER

## 2024-12-17 NOTE — TELEPHONE ENCOUNTER
Mary requesting a call stating the pharmacy did not receive a refill request for medication Vyvanse.    The patient stated our office sent over a new prescription but they don't have it.     The patient is out of medication.

## 2024-12-17 NOTE — TELEPHONE ENCOUNTER
Patient is requesting to have refill request cancelled at [facility] and sent to [facility].    Please advise.

## 2024-12-19 RX ORDER — LISDEXAMFETAMINE DIMESYLATE 30 MG/1
30 CAPSULE ORAL DAILY
Qty: 30 CAPSULE | Refills: 0 | Status: SHIPPED | OUTPATIENT
Start: 2024-12-19 | End: 2025-01-18

## 2025-01-13 DIAGNOSIS — F90.0 ATTENTION-DEFICIT HYPERACTIVITY DISORDER, PREDOMINANTLY INATTENTIVE TYPE: ICD-10-CM

## 2025-01-13 RX ORDER — LISDEXAMFETAMINE DIMESYLATE 30 MG/1
30 CAPSULE ORAL DAILY
Qty: 30 CAPSULE | Refills: 0 | Status: SHIPPED | OUTPATIENT
Start: 2025-01-13 | End: 2025-02-12

## 2025-01-30 ENCOUNTER — TELEPHONE (OUTPATIENT)
Age: 35
End: 2025-01-30

## 2025-02-06 ENCOUNTER — OFFICE VISIT (OUTPATIENT)
Age: 35
End: 2025-02-06
Payer: COMMERCIAL

## 2025-02-06 DIAGNOSIS — G43.719 CHRONIC MIGRAINE WITHOUT AURA, INTRACTABLE, WITHOUT STATUS MIGRAINOSUS: ICD-10-CM

## 2025-02-06 DIAGNOSIS — M54.2 CERVICALGIA: ICD-10-CM

## 2025-02-06 DIAGNOSIS — M79.18 MYOFASCIAL PAIN: Primary | ICD-10-CM

## 2025-02-06 PROCEDURE — 64615 CHEMODENERV MUSC MIGRAINE: CPT | Performed by: NURSE PRACTITIONER

## 2025-02-10 NOTE — PROGRESS NOTES
OFFICE PROCEDURE PROGRESS NOTE        Chart reviewed for the following:   Fracnisco MELCHOR APRN - NP, have reviewed the History, Physical and updated the Allergic reactions for Jayden Sanchez     TIME OUT performed immediately prior to start of procedure:   Francisco MELCHOR APRN - NP, have performed the following reviews on Jayden Sanchez prior to the start of the procedure:            * Patient was identified by name and date of birth   * Agreement on procedure being performed was verified  * Risks and Benefits explained to the patient  * Procedure site verified and marked as necessary  * Patient was positioned for comfort  * Consent was signed and verified     Time: 1500      Date of procedure: 2/6/2025    Procedure performed by:  ANDREE Ross NP    Provider assisted by: None    Patient assisted by: None    How tolerated by patient: tolerated the procedure well with no complications    Post Procedural Pain Scale: 2 - Hurts Little Bit    Comments: None    LOT: [id number]  EXP: 8/2026  Sample            Botox Injection Note       Indication: patient has chronic recurrent migraine, has 7-10 less migraine days per month with botox injections    Procedure:   Botox concentration: 200 units in 4 ml of preservative-free normal saline.   31 sites injections, distribution as follow      Units/site  Sites Sides Subtotal    Procerus 5 1 1 5    5 1 2 10   Frontalis 5 2 2 20   Temporalis 5 4 2 40   Occipitalis 5 3 2 30   Upper cervical paraspinalis 5 2 2 20   Trapezius 5 3 2 30         200 units Botox were reconstituted, 155 units injected as above and the remainder was unavoidably wasted.     Patient tolerated procedure well.     _____________________________   FRANCISCO SOLORZANO

## 2025-02-11 DIAGNOSIS — F90.0 ATTENTION-DEFICIT HYPERACTIVITY DISORDER, PREDOMINANTLY INATTENTIVE TYPE: ICD-10-CM

## 2025-02-13 RX ORDER — LISDEXAMFETAMINE DIMESYLATE 30 MG/1
30 CAPSULE ORAL DAILY
Qty: 30 CAPSULE | Refills: 0 | Status: SHIPPED | OUTPATIENT
Start: 2025-02-13 | End: 2025-03-15

## 2025-02-14 ENCOUNTER — TELEPHONE (OUTPATIENT)
Age: 35
End: 2025-02-14

## 2025-02-14 NOTE — TELEPHONE ENCOUNTER
Hi!   [facility] sent a prior authorization for you to complete for the Vyvanse. They said that you could fax it back to them.   Thank you!   Jayden

## 2025-03-30 RX ORDER — RIZATRIPTAN BENZOATE 10 MG/1
TABLET, ORALLY DISINTEGRATING ORAL
Qty: 9 TABLET | Refills: 4 | Status: SHIPPED | OUTPATIENT
Start: 2025-03-30

## 2025-04-01 DIAGNOSIS — F90.0 ATTENTION-DEFICIT HYPERACTIVITY DISORDER, PREDOMINANTLY INATTENTIVE TYPE: ICD-10-CM

## 2025-04-02 RX ORDER — LISDEXAMFETAMINE DIMESYLATE 30 MG/1
30 CAPSULE ORAL DAILY
Qty: 30 CAPSULE | Refills: 0 | Status: SHIPPED | OUTPATIENT
Start: 2025-04-02 | End: 2025-05-02

## 2025-04-28 NOTE — TELEPHONE ENCOUNTER
Botox Drug Acquisition: BUY AND BILL  POS 11 [address] btx   Dx: G43.719  Insurance: BCBS  Submission Type: Availty  [facility]:   CPT: 43129  Request tracking # [id number]  Reference #: [id number]  Approval Range: [place] APPROVED 25-26 (5 visits)    25:   benefits CLARITA torres sent

## 2025-05-08 ENCOUNTER — OFFICE VISIT (OUTPATIENT)
Age: 35
End: 2025-05-08

## 2025-05-08 DIAGNOSIS — F90.0 ATTENTION-DEFICIT HYPERACTIVITY DISORDER, PREDOMINANTLY INATTENTIVE TYPE: ICD-10-CM

## 2025-05-08 DIAGNOSIS — G43.719 CHRONIC MIGRAINE WITHOUT AURA, INTRACTABLE, WITHOUT STATUS MIGRAINOSUS: Primary | ICD-10-CM

## 2025-05-08 RX ORDER — ZOLMITRIPTAN 5 MG/1
TABLET, ORALLY DISINTEGRATING ORAL
Qty: 6 TABLET | Refills: 3 | Status: SHIPPED | OUTPATIENT
Start: 2025-05-08

## 2025-05-08 RX ORDER — LISDEXAMFETAMINE DIMESYLATE 30 MG/1
30 CAPSULE ORAL DAILY
Qty: 30 CAPSULE | Refills: 0 | Status: SHIPPED | OUTPATIENT
Start: 2025-05-08 | End: 2025-06-07

## 2025-05-09 NOTE — PROGRESS NOTES
OFFICE PROCEDURE PROGRESS NOTE      Chief Complaint   Patient presents with    Botox Injection     Patient states they are having    4 migraines per month, and patient has  70  % in reduction of migraines since the start of botox.                Chart reviewed for the following:   Francisco MELCHOR APRN - NP, have reviewed the History, Physical and updated the Allergic reactions for Jyaden Sanchez     TIME OUT performed immediately prior to start of procedure:   Francisco MELCHOR APRN - NP, have performed the following reviews on Jayden Sanchez prior to the start of the procedure:            * Patient was identified by name and date of birth   * Agreement on procedure being performed was verified  * Risks and Benefits explained to the patient  * Procedure site verified and marked as necessary  * Patient was positioned for comfort  * Consent was signed and verified     Time: 1500      Date of procedure: 5/8/2025    Procedure performed by:  ANDREE Ross NP    Provider assisted by: None    Patient assisted by: None    How tolerated by patient: tolerated the procedure well with no complications    Post Procedural Pain Scale: 2 - Hurts Little Bit    Comments: None      Botox Injection Note       Indication: patient has chronic recurrent migraine, has 7-10 less migraine days per month with botox injections    Procedure:   Botox concentration: 200 units in 4 ml of preservative-free normal saline.   31 sites injections, distribution as follow      Units/site  Sites Sides Subtotal    Procerus 5 1 1 5    5 1 2 10   Frontalis 5 2 2 20   Temporalis 5 4 2 40   Occipitalis 5 3 2 30   Upper cervical paraspinalis 5 2 2 20   Trapezius 5 3 2 30         200 units Botox were reconstituted, 155 units injected as above and the remainder was unavoidably wasted.     Patient tolerated procedure well.     _____________________________   FRANCISCO SLOORZANO

## 2025-05-15 DIAGNOSIS — F90.0 ATTENTION-DEFICIT HYPERACTIVITY DISORDER, PREDOMINANTLY INATTENTIVE TYPE: ICD-10-CM

## 2025-05-15 RX ORDER — LISDEXAMFETAMINE DIMESYLATE 30 MG/1
30 CAPSULE ORAL DAILY
Qty: 30 CAPSULE | Refills: 0 | Status: SHIPPED | OUTPATIENT
Start: 2025-05-15 | End: 2025-06-14

## 2025-06-07 DIAGNOSIS — F90.0 ATTENTION-DEFICIT HYPERACTIVITY DISORDER, PREDOMINANTLY INATTENTIVE TYPE: ICD-10-CM

## 2025-06-10 RX ORDER — LISDEXAMFETAMINE DIMESYLATE 30 MG/1
30 CAPSULE ORAL DAILY
Qty: 30 CAPSULE | Refills: 0 | Status: SHIPPED | OUTPATIENT
Start: 2025-06-10 | End: 2025-07-10

## 2025-07-11 DIAGNOSIS — F90.0 ATTENTION-DEFICIT HYPERACTIVITY DISORDER, PREDOMINANTLY INATTENTIVE TYPE: ICD-10-CM

## 2025-07-11 RX ORDER — LISDEXAMFETAMINE DIMESYLATE 30 MG/1
30 CAPSULE ORAL DAILY
Qty: 30 CAPSULE | Refills: 0 | Status: SHIPPED | OUTPATIENT
Start: 2025-07-11 | End: 2025-08-10

## 2025-08-07 ENCOUNTER — OFFICE VISIT (OUTPATIENT)
Age: 35
End: 2025-08-07
Payer: COMMERCIAL

## 2025-08-07 DIAGNOSIS — F90.0 ATTENTION-DEFICIT HYPERACTIVITY DISORDER, PREDOMINANTLY INATTENTIVE TYPE: Primary | ICD-10-CM

## 2025-08-07 DIAGNOSIS — G43.719 CHRONIC MIGRAINE WITHOUT AURA, INTRACTABLE, WITHOUT STATUS MIGRAINOSUS: ICD-10-CM

## 2025-08-07 PROCEDURE — 64615 CHEMODENERV MUSC MIGRAINE: CPT | Performed by: NURSE PRACTITIONER

## 2025-08-07 RX ORDER — LISDEXAMFETAMINE DIMESYLATE 40 MG/1
40 CAPSULE ORAL DAILY
Qty: 30 CAPSULE | Refills: 0 | Status: SHIPPED | OUTPATIENT
Start: 2025-08-07 | End: 2025-09-06

## 2025-08-07 RX ORDER — LISDEXAMFETAMINE DIMESYLATE 40 MG/1
40 CAPSULE ORAL DAILY
Qty: 30 CAPSULE | Refills: 0 | Status: SHIPPED | OUTPATIENT
Start: 2025-10-06 | End: 2025-11-05

## 2025-08-07 RX ORDER — LISDEXAMFETAMINE DIMESYLATE 40 MG/1
40 CAPSULE ORAL DAILY
Qty: 30 CAPSULE | Refills: 0 | Status: SHIPPED | OUTPATIENT
Start: 2025-09-06 | End: 2025-10-06